# Patient Record
Sex: MALE | Race: BLACK OR AFRICAN AMERICAN | Employment: FULL TIME | URBAN - METROPOLITAN AREA
[De-identification: names, ages, dates, MRNs, and addresses within clinical notes are randomized per-mention and may not be internally consistent; named-entity substitution may affect disease eponyms.]

---

## 2017-03-30 RX ORDER — METOPROLOL TARTRATE 50 MG/1
TABLET, FILM COATED ORAL
Qty: 30 TABLET | Refills: 3 | Status: SHIPPED | OUTPATIENT
Start: 2017-03-30 | End: 2017-08-22 | Stop reason: SDUPTHER

## 2017-09-19 ENCOUNTER — OFFICE VISIT (OUTPATIENT)
Dept: FAMILY MEDICINE CLINIC | Age: 57
End: 2017-09-19

## 2017-09-19 VITALS
SYSTOLIC BLOOD PRESSURE: 134 MMHG | HEIGHT: 65 IN | HEART RATE: 72 BPM | BODY MASS INDEX: 24.99 KG/M2 | WEIGHT: 150 LBS | RESPIRATION RATE: 14 BRPM | TEMPERATURE: 96.3 F | OXYGEN SATURATION: 98 % | DIASTOLIC BLOOD PRESSURE: 88 MMHG

## 2017-09-19 DIAGNOSIS — Z12.11 SCREEN FOR COLON CANCER: ICD-10-CM

## 2017-09-19 DIAGNOSIS — Z23 NEED FOR PNEUMOCOCCAL VACCINATION: ICD-10-CM

## 2017-09-19 DIAGNOSIS — E78.2 MIXED HYPERLIPIDEMIA: ICD-10-CM

## 2017-09-19 DIAGNOSIS — Z23 NEED FOR INFLUENZA VACCINATION: ICD-10-CM

## 2017-09-19 DIAGNOSIS — I10 ESSENTIAL HYPERTENSION: Primary | ICD-10-CM

## 2017-09-19 DIAGNOSIS — N52.2 DRUG-INDUCED ERECTILE DYSFUNCTION: ICD-10-CM

## 2017-09-19 PROCEDURE — 99214 OFFICE O/P EST MOD 30 MIN: CPT | Performed by: NURSE PRACTITIONER

## 2017-09-19 PROCEDURE — 90688 IIV4 VACCINE SPLT 0.5 ML IM: CPT | Performed by: NURSE PRACTITIONER

## 2017-09-19 PROCEDURE — 90472 IMMUNIZATION ADMIN EACH ADD: CPT | Performed by: NURSE PRACTITIONER

## 2017-09-19 PROCEDURE — G8427 DOCREV CUR MEDS BY ELIG CLIN: HCPCS | Performed by: NURSE PRACTITIONER

## 2017-09-19 PROCEDURE — 3017F COLORECTAL CA SCREEN DOC REV: CPT | Performed by: NURSE PRACTITIONER

## 2017-09-19 PROCEDURE — 4004F PT TOBACCO SCREEN RCVD TLK: CPT | Performed by: NURSE PRACTITIONER

## 2017-09-19 PROCEDURE — 90471 IMMUNIZATION ADMIN: CPT | Performed by: NURSE PRACTITIONER

## 2017-09-19 PROCEDURE — G8420 CALC BMI NORM PARAMETERS: HCPCS | Performed by: NURSE PRACTITIONER

## 2017-09-19 PROCEDURE — 90732 PPSV23 VACC 2 YRS+ SUBQ/IM: CPT | Performed by: NURSE PRACTITIONER

## 2017-09-19 RX ORDER — METOPROLOL TARTRATE 50 MG/1
TABLET, FILM COATED ORAL
Qty: 30 TABLET | Refills: 3 | Status: SHIPPED | OUTPATIENT
Start: 2017-09-19 | End: 2018-01-10 | Stop reason: SDUPTHER

## 2017-09-19 RX ORDER — SILDENAFIL 50 MG/1
50 TABLET, FILM COATED ORAL PRN
Qty: 30 TABLET | Refills: 1 | Status: SHIPPED | OUTPATIENT
Start: 2017-09-19 | End: 2019-01-29 | Stop reason: SDUPTHER

## 2017-09-19 ASSESSMENT — PATIENT HEALTH QUESTIONNAIRE - PHQ9
SUM OF ALL RESPONSES TO PHQ9 QUESTIONS 1 & 2: 0
1. LITTLE INTEREST OR PLEASURE IN DOING THINGS: 0
2. FEELING DOWN, DEPRESSED OR HOPELESS: 0
SUM OF ALL RESPONSES TO PHQ QUESTIONS 1-9: 0

## 2017-09-22 ASSESSMENT — ENCOUNTER SYMPTOMS
GASTROINTESTINAL NEGATIVE: 1
ABDOMINAL PAIN: 0
ANAL BLEEDING: 0
COLOR CHANGE: 0
DIARRHEA: 0
ALLERGIC/IMMUNOLOGIC NEGATIVE: 1
EYES NEGATIVE: 1
TROUBLE SWALLOWING: 0
SHORTNESS OF BREATH: 0
CONSTIPATION: 0
RESPIRATORY NEGATIVE: 1
RECTAL PAIN: 0
VOICE CHANGE: 0
BLOOD IN STOOL: 0

## 2017-10-06 ENCOUNTER — HOSPITAL ENCOUNTER (OUTPATIENT)
Age: 57
Setting detail: OUTPATIENT SURGERY
Discharge: HOME | End: 2017-10-06
Attending: SPECIALIST | Admitting: SPECIALIST
Payer: COMMERCIAL

## 2017-10-06 ENCOUNTER — ANESTHESIA EVENT (OUTPATIENT)
Dept: ENDOSCOPY | Age: 57
End: 2017-10-06
Payer: COMMERCIAL

## 2017-10-06 ENCOUNTER — ANESTHESIA (OUTPATIENT)
Dept: ENDOSCOPY | Age: 57
End: 2017-10-06
Payer: COMMERCIAL

## 2017-10-06 VITALS
WEIGHT: 150 LBS | OXYGEN SATURATION: 99 % | RESPIRATION RATE: 16 BRPM | BODY MASS INDEX: 24.99 KG/M2 | HEART RATE: 66 BPM | HEIGHT: 65 IN | SYSTOLIC BLOOD PRESSURE: 198 MMHG | TEMPERATURE: 97.7 F | DIASTOLIC BLOOD PRESSURE: 101 MMHG

## 2017-10-06 VITALS
SYSTOLIC BLOOD PRESSURE: 166 MMHG | RESPIRATION RATE: 15 BRPM | OXYGEN SATURATION: 97 % | DIASTOLIC BLOOD PRESSURE: 88 MMHG

## 2017-10-06 PROCEDURE — 6360000002 HC RX W HCPCS: Performed by: NURSE ANESTHETIST, CERTIFIED REGISTERED

## 2017-10-06 PROCEDURE — 7100000010 HC PHASE II RECOVERY - FIRST 15 MIN: Performed by: SPECIALIST

## 2017-10-06 PROCEDURE — 2580000003 HC RX 258: Performed by: SPECIALIST

## 2017-10-06 PROCEDURE — 88305 TISSUE EXAM BY PATHOLOGIST: CPT

## 2017-10-06 PROCEDURE — 3700000000 HC ANESTHESIA ATTENDED CARE: Performed by: SPECIALIST

## 2017-10-06 PROCEDURE — 3609027000 HC COLONOSCOPY: Performed by: SPECIALIST

## 2017-10-06 PROCEDURE — 3700000001 HC ADD 15 MINUTES (ANESTHESIA): Performed by: SPECIALIST

## 2017-10-06 PROCEDURE — 2500000003 HC RX 250 WO HCPCS: Performed by: SPECIALIST

## 2017-10-06 PROCEDURE — 7100000011 HC PHASE II RECOVERY - ADDTL 15 MIN: Performed by: SPECIALIST

## 2017-10-06 RX ORDER — SODIUM CHLORIDE 0.9 % (FLUSH) 0.9 %
10 SYRINGE (ML) INJECTION EVERY 12 HOURS SCHEDULED
Status: DISCONTINUED | OUTPATIENT
Start: 2017-10-06 | End: 2017-10-06 | Stop reason: HOSPADM

## 2017-10-06 RX ORDER — LIDOCAINE HYDROCHLORIDE 10 MG/ML
1 INJECTION, SOLUTION EPIDURAL; INFILTRATION; INTRACAUDAL; PERINEURAL
Status: COMPLETED | OUTPATIENT
Start: 2017-10-06 | End: 2017-10-06

## 2017-10-06 RX ORDER — PROPOFOL 10 MG/ML
INJECTION, EMULSION INTRAVENOUS PRN
Status: DISCONTINUED | OUTPATIENT
Start: 2017-10-06 | End: 2017-10-06 | Stop reason: SDUPTHER

## 2017-10-06 RX ORDER — CETIRIZINE HYDROCHLORIDE 10 MG/1
10 TABLET ORAL DAILY
COMMUNITY
End: 2020-02-27 | Stop reason: SDUPTHER

## 2017-10-06 RX ORDER — SODIUM CHLORIDE 0.9 % (FLUSH) 0.9 %
10 SYRINGE (ML) INJECTION PRN
Status: DISCONTINUED | OUTPATIENT
Start: 2017-10-06 | End: 2017-10-06 | Stop reason: HOSPADM

## 2017-10-06 RX ORDER — SODIUM CHLORIDE 9 MG/ML
INJECTION, SOLUTION INTRAVENOUS CONTINUOUS
Status: DISCONTINUED | OUTPATIENT
Start: 2017-10-06 | End: 2017-10-06 | Stop reason: HOSPADM

## 2017-10-06 RX ORDER — ONDANSETRON 2 MG/ML
4 INJECTION INTRAMUSCULAR; INTRAVENOUS
Status: DISCONTINUED | OUTPATIENT
Start: 2017-10-06 | End: 2017-10-06 | Stop reason: HOSPADM

## 2017-10-06 RX ADMIN — PROPOFOL 150 MG: 10 INJECTION, EMULSION INTRAVENOUS at 09:32

## 2017-10-06 RX ADMIN — LIDOCAINE HYDROCHLORIDE 30 MG: 10 INJECTION, SOLUTION EPIDURAL; INFILTRATION; INTRACAUDAL; PERINEURAL at 09:32

## 2017-10-06 RX ADMIN — SODIUM CHLORIDE: 9 INJECTION, SOLUTION INTRAVENOUS at 08:44

## 2017-10-06 ASSESSMENT — PAIN - FUNCTIONAL ASSESSMENT: PAIN_FUNCTIONAL_ASSESSMENT: 0-10

## 2017-10-06 ASSESSMENT — PAIN DESCRIPTION - DESCRIPTORS: DESCRIPTORS: ACHING

## 2017-10-06 NOTE — ANESTHESIA PRE PROCEDURE
Department of Anesthesiology  Preprocedure Note       Name:  Panda Human   Age:  62 y.o.  :  1960                                          MRN:  [id number]         Date:  10/6/2017      Surgeon: Roxana Sanchez):  Angel Don MD    Procedure: Procedure(s):  COLONOSCOPY    Medications prior to admission:   Prior to Admission medications    Medication Sig Start Date End Date Taking?  Authorizing Provider   cetirizine (ZYRTEC) 10 MG tablet Take 10 mg by mouth daily   Yes Historical Provider, MD   sildenafil (VIAGRA) 50 MG tablet Take 1 tablet by mouth as needed for Erectile Dysfunction 17  Yes Pheobe Merlin, NP   metoprolol tartrate (LOPRESSOR) 50 MG tablet take 1 tablet by mouth once daily 17  Yes Pheobe Merlin, NP       Current medications:    Current Facility-Administered Medications   Medication Dose Route Frequency Provider Last Rate Last Dose    0.9 % sodium chloride infusion   Intravenous Continuous Angel Don  mL/hr at 10/06/17 0844      sodium chloride flush 0.9 % injection 10 mL  10 mL Intravenous 2 times per day Angel Don MD        sodium chloride flush 0.9 % injection 10 mL  10 mL Intravenous PRN Angel Don MD        lidocaine PF 1 % injection 1 mL  1 mL Intradermal Once PRN Angel Don MD           Allergies:  No Known Allergies    Problem List:    Patient Active Problem List   Diagnosis Code    Osteoarthritis M19.90    Hypertension I10    Hyperlipidemia E78.5       Past Medical History:        Diagnosis Date    Hyperlipidemia     Hypertension     Osteoarthritis     S/P inguinal herniorrhaphy     left       Past Surgical History:        Procedure Laterality Date    HERNIA REPAIR Bilateral     TONSILLECTOMY AND ADENOIDECTOMY         Social History:    Social History   Substance Use Topics    Smoking status: Current Every Day Smoker     Packs/day: 1.00     Years: 20.00     Types: Cigarettes    Smokeless tobacco: Not on file    Alcohol use 10.8 Screen (If Applicable):  No results found for: [facility], [address]    Anesthesia Evaluation  Patient summary reviewed no history of anesthetic complications:   Airway: Mallampati: II  TM distance: >3 FB   Neck ROM: full  Mouth opening: > = 3 FB Dental: normal exam         Pulmonary:negative ROS and normal exam           Cardiovascular:  Exercise tolerance: good (>4 METS),   (+) hypertension: moderate,             Beta Blocker:  Dose within 24 Hrs   Neuro/Psych:   {neg ROS     GI/Hepatic/Renal:   (+) bowel prep        Endo/Other:    (+) : arthritis: OA. Abdominal:                    Anesthesia Plan    ASA 2     MAC     intravenous induction   Anesthetic plan and risks discussed with patient.     Plan discussed with surgical team.            July Bowen CRNA   10/6/2017

## 2017-10-06 NOTE — ANESTHESIA POSTPROCEDURE EVALUATION
Department of Anesthesiology  Postprocedure Note    Patient: Agueda Chang  MRN: [id number]  YOB: 1960  Date of evaluation: 10/6/2017  Time:  9:54 AM     Procedure Summary     Date Anesthesia Start Anesthesia Stop Room / Location    10/06/17 0932 [facility] [address] [facility]       Procedure Diagnosis Surgeon Responsible Provider    COLONOSCOPY (N/A ) ([id number] - Screening, h/x of colon polyps) MD Nahum Parra CRNA          Anesthesia Type: MAC    Rosa Phase I:      Rosa Phase II:      Last vitals:  BP  166/58   Temp   36   Pulse  68   Resp   18   SpO2   100     Anesthesia Post Evaluation    Patient location during evaluation: bedside  Patient participation: complete - patient participated  Level of consciousness: awake and awake and alert  Pain score: 0  Airway patency: patent  Nausea & Vomiting: no nausea and no vomiting  Complications: no  Cardiovascular status: blood pressure returned to baseline and hemodynamically stable  Respiratory status: acceptable  Hydration status: euvolemic

## 2017-11-01 DIAGNOSIS — E78.2 MIXED HYPERLIPIDEMIA: ICD-10-CM

## 2017-11-01 DIAGNOSIS — I10 ESSENTIAL HYPERTENSION: ICD-10-CM

## 2017-11-01 LAB
ALBUMIN SERPL-MCNC: 4.5 G/DL (ref 3.9–4.9)
ALP BLD-CCNC: 69 U/L (ref 35–104)
ALT SERPL-CCNC: 17 U/L (ref 0–41)
ANION GAP SERPL CALCULATED.3IONS-SCNC: 16 MEQ/L (ref 7–13)
AST SERPL-CCNC: 18 U/L (ref 0–40)
BILIRUB SERPL-MCNC: 0.6 MG/DL (ref 0–1.2)
BUN BLDV-MCNC: 13 MG/DL (ref 6–20)
CALCIUM SERPL-MCNC: 9.6 MG/DL (ref 8.6–10.2)
CHLORIDE BLD-SCNC: 100 MEQ/L (ref 98–107)
CHOLESTEROL, TOTAL: 269 MG/DL (ref 0–199)
CO2: 24 MEQ/L (ref 22–29)
CREAT SERPL-MCNC: 1.07 MG/DL (ref 0.7–1.2)
GFR AFRICAN AMERICAN: >60
GFR NON-AFRICAN AMERICAN: >60
GLOBULIN: 3.2 G/DL (ref 2.3–3.5)
GLUCOSE BLD-MCNC: 99 MG/DL (ref 74–109)
HCT VFR BLD CALC: 47.9 % (ref 42–52)
HDLC SERPL-MCNC: 41 MG/DL (ref 40–59)
HEMOGLOBIN: 15.7 G/DL (ref 14–18)
LDL CHOLESTEROL CALCULATED: 194 MG/DL (ref 0–129)
MCH RBC QN AUTO: 28.1 PG (ref 27–31.3)
MCHC RBC AUTO-ENTMCNC: 32.8 % (ref 33–37)
MCV RBC AUTO: 85.8 FL (ref 80–100)
PDW BLD-RTO: 14.1 % (ref 11.5–14.5)
PLATELET # BLD: 256 K/UL (ref 130–400)
POTASSIUM SERPL-SCNC: 4.4 MEQ/L (ref 3.5–5.1)
RBC # BLD: 5.59 M/UL (ref 4.7–6.1)
SODIUM BLD-SCNC: 140 MEQ/L (ref 132–144)
TOTAL PROTEIN: 7.7 G/DL (ref 6.4–8.1)
TRIGL SERPL-MCNC: 170 MG/DL (ref 0–200)
WBC # BLD: 5.9 K/UL (ref 4.8–10.8)

## 2017-11-28 RX ORDER — ATORVASTATIN CALCIUM 10 MG/1
10 TABLET, FILM COATED ORAL DAILY
Qty: 30 TABLET | Refills: 3 | Status: SHIPPED | OUTPATIENT
Start: 2017-11-28 | End: 2018-03-27 | Stop reason: SDUPTHER

## 2017-12-18 ENCOUNTER — OFFICE VISIT (OUTPATIENT)
Dept: FAMILY MEDICINE CLINIC | Age: 57
End: 2017-12-18

## 2017-12-18 VITALS
HEART RATE: 77 BPM | OXYGEN SATURATION: 98 % | SYSTOLIC BLOOD PRESSURE: 128 MMHG | RESPIRATION RATE: 14 BRPM | TEMPERATURE: 98.3 F | DIASTOLIC BLOOD PRESSURE: 80 MMHG | HEIGHT: 65 IN | WEIGHT: 159 LBS | BODY MASS INDEX: 26.49 KG/M2

## 2017-12-18 DIAGNOSIS — H61.22 CERUMEN DEBRIS ON TYMPANIC MEMBRANE OF LEFT EAR: ICD-10-CM

## 2017-12-18 DIAGNOSIS — I10 ESSENTIAL HYPERTENSION: Primary | ICD-10-CM

## 2017-12-18 DIAGNOSIS — E78.2 MIXED HYPERLIPIDEMIA: ICD-10-CM

## 2017-12-18 PROCEDURE — G8484 FLU IMMUNIZE NO ADMIN: HCPCS | Performed by: NURSE PRACTITIONER

## 2017-12-18 PROCEDURE — G8419 CALC BMI OUT NRM PARAM NOF/U: HCPCS | Performed by: NURSE PRACTITIONER

## 2017-12-18 PROCEDURE — 99214 OFFICE O/P EST MOD 30 MIN: CPT | Performed by: NURSE PRACTITIONER

## 2017-12-18 PROCEDURE — G8427 DOCREV CUR MEDS BY ELIG CLIN: HCPCS | Performed by: NURSE PRACTITIONER

## 2017-12-18 PROCEDURE — 3017F COLORECTAL CA SCREEN DOC REV: CPT | Performed by: NURSE PRACTITIONER

## 2017-12-18 PROCEDURE — 69209 REMOVE IMPACTED EAR WAX UNI: CPT | Performed by: NURSE PRACTITIONER

## 2017-12-18 PROCEDURE — 4004F PT TOBACCO SCREEN RCVD TLK: CPT | Performed by: NURSE PRACTITIONER

## 2017-12-18 ASSESSMENT — ENCOUNTER SYMPTOMS
COLOR CHANGE: 0
BLOOD IN STOOL: 0
RECTAL PAIN: 0
TROUBLE SWALLOWING: 0
ANAL BLEEDING: 0
DIARRHEA: 0
EYES NEGATIVE: 1
ABDOMINAL PAIN: 0
ALLERGIC/IMMUNOLOGIC NEGATIVE: 1
VOICE CHANGE: 0
SHORTNESS OF BREATH: 0
RESPIRATORY NEGATIVE: 1
CONSTIPATION: 0
GASTROINTESTINAL NEGATIVE: 1

## 2017-12-18 NOTE — PATIENT INSTRUCTIONS
Thank you for enrolling in [address] Please follow the instructions below to securely access your online medical record. [employer] allows you to send messages to your doctor, view your test results, renew your prescriptions, schedule appointments, and more. How Do I Sign Up? 1. In your Internet browser, go to [email or web address]  2. Click on the Sign Up Now link in the Sign In box. You will see the New Member Sign Up page. 3. Enter your [employer] Access Code exactly as it appears below. You will not need to use this code after youve completed the sign-up process. If you do not sign up before the expiration date, you must request a new code. [employer] Access Code: [email or web address]  Expires: 2/16/2018  4:37 PM    4. Enter your Social Security Number ([id number]) and Date of Birth (mm/dd/yyyy) as indicated and click Submit. You will be taken to the next sign-up page. 5. Create a [employer] ID. This will be your [employer] login ID and cannot be changed, so think of one that is secure and easy to remember. 6. Create a [employer] password. You can change your password at any time. 7. Enter your Password Reset Question and Answer. This can be used at a later time if you forget your password. 8. Enter your e-mail address. You will receive e-mail notification when new information is available in [address] 9. Click Sign Up. You can now view your medical record. Additional Information  If you have questions, please contact your physician practice where you receive care. Remember, [employer] is NOT to be used for urgent needs. For medical emergencies, dial 911.

## 2017-12-18 NOTE — PROGRESS NOTES
- 144 mEq/L    Potassium 4.4 3.5 - 5.1 mEq/L    Chloride 100 98 - 107 mEq/L    CO2 24 22 - 29 mEq/L    Anion Gap 16 (H) 7 - 13 mEq/L    Glucose 99 74 - 109 mg/dL    BUN 13 6 - 20 mg/dL    CREATININE 1.07 0.70 - 1.20 mg/dL    GFR Non-African American >60.0 >60    GFR  >60.0 >60    Calcium 9.6 8.6 - 10.2 mg/dL    Total Protein 7.7 6.4 - 8.1 g/dL    Alb 4.5 3.9 - 4.9 g/dL    Total Bilirubin 0.6 0.0 - 1.2 mg/dL    Alkaline Phosphatase 69 35 - 104 U/L    ALT 17 0 - 41 U/L    AST 18 0 - 40 U/L    Globulin 3.2 2.3 - 3.5 g/dL   CBC   Result Value Ref Range    WBC 5.9 4.8 - 10.8 K/uL    RBC 5.59 4.70 - 6.10 M/uL    Hemoglobin 15.7 14.0 - 18.0 g/dL    Hematocrit 47.9 42.0 - 52.0 %    MCV 85.8 80.0 - 100.0 fL    MCH 28.1 27.0 - 31.3 pg    MCHC 32.8 (L) 33.0 - 37.0 %    RDW 14.1 11.5 - 14.5 %    Platelets [phone] - 853 K/uL           Assessment & Plan   Georges Goldberg was seen today for discuss labs. Diagnoses and all orders for this visit:    Essential hypertension    Mixed hyperlipidemia  -     Lipid Panel; Future  -     Comprehensive Metabolic Panel; Future    Cerumen debris on tympanic membrane of left ear  -     CA REMOVAL IMPACTED CERUMEN IRRIGATION/LVG UNILAT  -     21874 - CA REMOVE IMPACTED EAR WAX      Orders Placed This Encounter   Procedures    Lipid Panel     Standing Status:   Future     Standing Expiration Date:   12/18/2018     Order Specific Question:   Is Patient Fasting?/# of Hours     Answer:   y    Comprehensive Metabolic Panel     Standing Status:   Future     Standing Expiration Date:   12/18/2018    CA REMOVAL IMPACTED CERUMEN IRRIGATION/LVG UNILAT    22011 - CA REMOVE IMPACTED EAR WAX     No orders of the defined types were placed in this encounter. There are no discontinued medications. Return for Labs in 6 weeks. Reviewed with the patient: current clinical status, medications, activities and diet.      Side effects, adverse effects of the medication prescribed today, as well as treatment plan/ rationale and result expectations have been discussed with the patient who expresses understanding and desires to proceed. Close follow up to evaluate treatment results and for coordination of care. I have reviewed the patient's medical history in detail and updated the computerized patient record.     Kenia Marrero NP

## 2018-01-10 DIAGNOSIS — I10 ESSENTIAL HYPERTENSION: ICD-10-CM

## 2018-01-10 RX ORDER — METOPROLOL TARTRATE 50 MG/1
TABLET, FILM COATED ORAL
Qty: 30 TABLET | Refills: 3 | Status: SHIPPED | OUTPATIENT
Start: 2018-01-10 | End: 2018-05-25 | Stop reason: SDUPTHER

## 2018-03-27 RX ORDER — ATORVASTATIN CALCIUM 10 MG/1
10 TABLET, FILM COATED ORAL DAILY
Qty: 30 TABLET | Refills: 3 | Status: SHIPPED | OUTPATIENT
Start: 2018-03-27 | End: 2018-07-24 | Stop reason: SDUPTHER

## 2018-05-25 DIAGNOSIS — I10 ESSENTIAL HYPERTENSION: ICD-10-CM

## 2018-05-25 RX ORDER — METOPROLOL TARTRATE 50 MG/1
TABLET, FILM COATED ORAL
Qty: 30 TABLET | Refills: 0 | Status: SHIPPED | OUTPATIENT
Start: 2018-05-25 | End: 2018-07-06 | Stop reason: SDUPTHER

## 2018-07-24 ENCOUNTER — OFFICE VISIT (OUTPATIENT)
Dept: FAMILY MEDICINE CLINIC | Age: 58
End: 2018-07-24
Payer: COMMERCIAL

## 2018-07-24 VITALS
DIASTOLIC BLOOD PRESSURE: 88 MMHG | HEART RATE: 64 BPM | HEIGHT: 65 IN | BODY MASS INDEX: 27.96 KG/M2 | WEIGHT: 167.8 LBS | OXYGEN SATURATION: 99 % | TEMPERATURE: 97.8 F | RESPIRATION RATE: 16 BRPM | SYSTOLIC BLOOD PRESSURE: 134 MMHG

## 2018-07-24 DIAGNOSIS — E78.2 MIXED HYPERLIPIDEMIA: Primary | ICD-10-CM

## 2018-07-24 DIAGNOSIS — E78.2 MIXED HYPERLIPIDEMIA: ICD-10-CM

## 2018-07-24 DIAGNOSIS — I10 ESSENTIAL HYPERTENSION: ICD-10-CM

## 2018-07-24 DIAGNOSIS — J30.1 ACUTE SEASONAL ALLERGIC RHINITIS DUE TO POLLEN: ICD-10-CM

## 2018-07-24 LAB
ALBUMIN SERPL-MCNC: 4 G/DL (ref 3.9–4.9)
ALP BLD-CCNC: 78 U/L (ref 35–104)
ALT SERPL-CCNC: 23 U/L (ref 0–41)
ANION GAP SERPL CALCULATED.3IONS-SCNC: 14 MEQ/L (ref 7–13)
AST SERPL-CCNC: 24 U/L (ref 0–40)
BILIRUB SERPL-MCNC: <0.2 MG/DL (ref 0–1.2)
BUN BLDV-MCNC: 12 MG/DL (ref 6–20)
CALCIUM SERPL-MCNC: 8.7 MG/DL (ref 8.6–10.2)
CHLORIDE BLD-SCNC: 103 MEQ/L (ref 98–107)
CHOLESTEROL, TOTAL: 200 MG/DL (ref 0–199)
CO2: 23 MEQ/L (ref 22–29)
CREAT SERPL-MCNC: 1.04 MG/DL (ref 0.7–1.2)
GFR AFRICAN AMERICAN: >60
GFR NON-AFRICAN AMERICAN: >60
GLOBULIN: 3.5 G/DL (ref 2.3–3.5)
GLUCOSE BLD-MCNC: 96 MG/DL (ref 74–109)
HDLC SERPL-MCNC: 36 MG/DL (ref 40–59)
LDL CHOLESTEROL CALCULATED: 116 MG/DL (ref 0–129)
POTASSIUM SERPL-SCNC: 4.5 MEQ/L (ref 3.5–5.1)
SODIUM BLD-SCNC: 140 MEQ/L (ref 132–144)
TOTAL PROTEIN: 7.5 G/DL (ref 6.4–8.1)
TRIGL SERPL-MCNC: 238 MG/DL (ref 0–200)

## 2018-07-24 PROCEDURE — 99214 OFFICE O/P EST MOD 30 MIN: CPT | Performed by: NURSE PRACTITIONER

## 2018-07-24 PROCEDURE — G8419 CALC BMI OUT NRM PARAM NOF/U: HCPCS | Performed by: NURSE PRACTITIONER

## 2018-07-24 PROCEDURE — G8427 DOCREV CUR MEDS BY ELIG CLIN: HCPCS | Performed by: NURSE PRACTITIONER

## 2018-07-24 PROCEDURE — 3017F COLORECTAL CA SCREEN DOC REV: CPT | Performed by: NURSE PRACTITIONER

## 2018-07-24 PROCEDURE — 4004F PT TOBACCO SCREEN RCVD TLK: CPT | Performed by: NURSE PRACTITIONER

## 2018-07-24 PROCEDURE — 96372 THER/PROPH/DIAG INJ SC/IM: CPT | Performed by: NURSE PRACTITIONER

## 2018-07-24 RX ORDER — METOPROLOL TARTRATE 50 MG/1
TABLET, FILM COATED ORAL
Qty: 30 TABLET | Refills: 5 | Status: SHIPPED | OUTPATIENT
Start: 2018-07-24 | End: 2020-02-27 | Stop reason: CLARIF

## 2018-07-24 RX ORDER — ATORVASTATIN CALCIUM 10 MG/1
10 TABLET, FILM COATED ORAL DAILY
Qty: 30 TABLET | Refills: 5 | Status: SHIPPED | OUTPATIENT
Start: 2018-07-24 | End: 2019-01-29 | Stop reason: SDUPTHER

## 2018-07-24 RX ORDER — METHYLPREDNISOLONE ACETATE 80 MG/ML
80 INJECTION, SUSPENSION INTRA-ARTICULAR; INTRALESIONAL; INTRAMUSCULAR; SOFT TISSUE ONCE
Status: COMPLETED | OUTPATIENT
Start: 2018-07-24 | End: 2018-07-24

## 2018-07-24 RX ADMIN — METHYLPREDNISOLONE ACETATE 80 MG: 80 INJECTION, SUSPENSION INTRA-ARTICULAR; INTRALESIONAL; INTRAMUSCULAR; SOFT TISSUE at 16:05

## 2018-07-24 ASSESSMENT — PATIENT HEALTH QUESTIONNAIRE - PHQ9
1. LITTLE INTEREST OR PLEASURE IN DOING THINGS: 0
SUM OF ALL RESPONSES TO PHQ9 QUESTIONS 1 & 2: 0
SUM OF ALL RESPONSES TO PHQ QUESTIONS 1-9: 0
2. FEELING DOWN, DEPRESSED OR HOPELESS: 0

## 2018-07-31 ASSESSMENT — ENCOUNTER SYMPTOMS
ALLERGIC/IMMUNOLOGIC NEGATIVE: 1
COLOR CHANGE: 0
VOICE CHANGE: 0
GASTROINTESTINAL NEGATIVE: 1
ABDOMINAL PAIN: 0
RESPIRATORY NEGATIVE: 1
TROUBLE SWALLOWING: 0
DIARRHEA: 0
CONSTIPATION: 0
SHORTNESS OF BREATH: 0
EYES NEGATIVE: 1
BLOOD IN STOOL: 0
ANAL BLEEDING: 0
RECTAL PAIN: 0

## 2018-07-31 NOTE — PROGRESS NOTES
Subjective  Leane [place], 62 y.o. male presents today with:  Chief Complaint   Patient presents with    Medication Refill     pt states he's just here for a refill on his metoprolol. has no concerns or issues at this time           HTN-  Not well controlled currently taking  Metoprolol 50mg,  Would like to increase or ad another medication-  Gets headaches when BP is elevated. Hyperlipidemia-  Untreated has been trying dietary changes -  Not working well has  agreed to start Lipitor  ED-  Uses Viagra PRN with good results. He is currently taking all prescribed medications at home. Overall is doing well. Has no other questions or concerns at this time. Review of Systems   Constitutional: Negative. Negative for activity change, appetite change, fatigue and unexpected weight change. HENT: Negative for dental problem, nosebleeds, trouble swallowing and voice change. Eyes: Negative. Negative for visual disturbance. Respiratory: Negative. Negative for shortness of breath. Cardiovascular: Negative. Negative for chest pain, palpitations and leg swelling. Gastrointestinal: Negative. Negative for abdominal pain, anal bleeding, blood in stool, constipation, diarrhea and rectal pain. Endocrine: Negative. Negative for cold intolerance, heat intolerance, polydipsia, polyphagia and polyuria. Genitourinary: Negative. Musculoskeletal: Negative. Skin: Negative. Negative for color change and rash. Allergic/Immunologic: Negative. Neurological: Positive for headaches (intermittently when BP elevated). Negative for dizziness, syncope and weakness. Hematological: Negative. Negative for adenopathy. Does not bruise/bleed easily. Psychiatric/Behavioral: Negative. Negative for dysphoric mood and sleep disturbance. The patient is not nervous/anxious.         Past Medical History:   Diagnosis Date    Hyperlipidemia     Hypertension     Osteoarthritis     S/P inguinal herniorrhaphy He does not have a sickly appearance. He does not appear ill. No distress. HENT:   Head: Normocephalic and atraumatic. Right Ear: Tympanic membrane, external ear and ear canal normal.   Left Ear: Tympanic membrane, external ear and ear canal normal.   Nose: Nose normal.   Mouth/Throat: Uvula is midline, oropharynx is clear and moist and mucous membranes are normal.   Initially left TM was occluded with cerumen. Left ears cleaned with curettes , the flushed with tepid tap water for large amounts of chunky think brown discharge. TM was visualized  after irrigation. Eyes: Conjunctivae and EOM are normal. Pupils are equal, round, and reactive to light. Neck: Normal range of motion. Neck supple. No JVD present. No thyromegaly present. Cardiovascular: Normal rate, regular rhythm, S1 normal, S2 normal, normal heart sounds and intact distal pulses. PMI is not displaced. No murmur heard. Pulmonary/Chest: Effort normal and breath sounds normal. No accessory muscle usage. No tachypnea. No respiratory distress. He has no decreased breath sounds. He has no wheezes. He has no rhonchi. He has no rales. Abdominal: Soft. Bowel sounds are normal. He exhibits no distension and no mass. There is no splenomegaly or hepatomegaly. There is no tenderness. There is no CVA tenderness. Lymphadenopathy:     He has no cervical adenopathy. Neurological: He is alert and oriented to person, place, and time. He has normal strength. No cranial nerve deficit or sensory deficit. He displays a negative Romberg sign. Coordination and gait normal. GCS eye subscore is 4. GCS verbal subscore is 5. GCS motor subscore is 6. Skin: Skin is warm, dry and intact. No rash noted. Psychiatric: He has a normal mood and affect.  His speech is normal and behavior is normal.     Results for orders placed or performed in visit on 11/01/17   Lipid Panel   Result Value Ref Range    Cholesterol, Total 269 (H) 0 - 199 mg/dL    Triglycerides 170 0 - 200 mg/dL    HDL 41 40 - 59 mg/dL    LDL Calculated 194 (H) 0 - 129 mg/dL   Comprehensive Metabolic Panel   Result Value Ref Range    Sodium 140 132 - 144 mEq/L    Potassium 4.4 3.5 - 5.1 mEq/L    Chloride 100 98 - 107 mEq/L    CO2 24 22 - 29 mEq/L    Anion Gap 16 (H) 7 - 13 mEq/L    Glucose 99 74 - 109 mg/dL    BUN 13 6 - 20 mg/dL    CREATININE 1.07 0.70 - 1.20 mg/dL    GFR Non-African American >60.0 >60    GFR  >60.0 >60    Calcium 9.6 8.6 - 10.2 mg/dL    Total Protein 7.7 6.4 - 8.1 g/dL    Alb 4.5 3.9 - 4.9 g/dL    Total Bilirubin 0.6 0.0 - 1.2 mg/dL    Alkaline Phosphatase 69 35 - 104 U/L    ALT 17 0 - 41 U/L    AST 18 0 - 40 U/L    Globulin 3.2 2.3 - 3.5 g/dL   CBC   Result Value Ref Range    WBC 5.9 4.8 - 10.8 K/uL    RBC 5.59 4.70 - 6.10 M/uL    Hemoglobin 15.7 14.0 - 18.0 g/dL    Hematocrit 47.9 42.0 - 52.0 %    MCV 85.8 80.0 - 100.0 fL    MCH 28.1 27.0 - 31.3 pg    MCHC 32.8 (L) 33.0 - 37.0 %    RDW 14.1 11.5 - 14.5 %    Platelets 715 988 - 151 K/uL           Assessment & Plan   Ashutosh [place] was seen today for medication refill. Diagnoses and all orders for this visit:    Mixed hyperlipidemia  -     Lipid Panel; Future  -     Comprehensive Metabolic Panel; Future    Essential hypertension  -     metoprolol tartrate (LOPRESSOR) 50 MG tablet; take 1 tablet by mouth once daily  -     Lipid Panel; Future  -     Comprehensive Metabolic Panel; Future    Acute seasonal allergic rhinitis due to pollen  -     methylPREDNISolone acetate (DEPO-MEDROL) injection 80 mg; Inject 1 mL into the muscle once    Other orders  -     atorvastatin (LIPITOR) 10 MG tablet;  Take 1 tablet by mouth daily      Orders Placed This Encounter   Procedures    Lipid Panel     Standing Status:   Future     Number of Occurrences:   1     Standing Expiration Date:   7/24/2019     Order Specific Question:   Is Patient Fasting?/# of Hours     Answer:   n    Comprehensive Metabolic Panel     Standing Status:   Future     Number

## 2019-01-29 ENCOUNTER — OFFICE VISIT (OUTPATIENT)
Dept: FAMILY MEDICINE CLINIC | Age: 59
End: 2019-01-29
Payer: COMMERCIAL

## 2019-01-29 VITALS
HEIGHT: 65 IN | RESPIRATION RATE: 15 BRPM | WEIGHT: 166 LBS | DIASTOLIC BLOOD PRESSURE: 102 MMHG | TEMPERATURE: 97.9 F | SYSTOLIC BLOOD PRESSURE: 160 MMHG | HEART RATE: 86 BPM | BODY MASS INDEX: 27.66 KG/M2

## 2019-01-29 DIAGNOSIS — I10 ESSENTIAL HYPERTENSION: ICD-10-CM

## 2019-01-29 DIAGNOSIS — N52.2 DRUG-INDUCED ERECTILE DYSFUNCTION: ICD-10-CM

## 2019-01-29 DIAGNOSIS — Z12.5 PROSTATE CANCER SCREENING: Primary | ICD-10-CM

## 2019-01-29 DIAGNOSIS — E78.2 MIXED HYPERLIPIDEMIA: ICD-10-CM

## 2019-01-29 PROCEDURE — 4004F PT TOBACCO SCREEN RCVD TLK: CPT | Performed by: NURSE PRACTITIONER

## 2019-01-29 PROCEDURE — G8484 FLU IMMUNIZE NO ADMIN: HCPCS | Performed by: NURSE PRACTITIONER

## 2019-01-29 PROCEDURE — 99214 OFFICE O/P EST MOD 30 MIN: CPT | Performed by: NURSE PRACTITIONER

## 2019-01-29 PROCEDURE — 3017F COLORECTAL CA SCREEN DOC REV: CPT | Performed by: NURSE PRACTITIONER

## 2019-01-29 PROCEDURE — G8427 DOCREV CUR MEDS BY ELIG CLIN: HCPCS | Performed by: NURSE PRACTITIONER

## 2019-01-29 PROCEDURE — G8419 CALC BMI OUT NRM PARAM NOF/U: HCPCS | Performed by: NURSE PRACTITIONER

## 2019-01-29 RX ORDER — METOPROLOL TARTRATE AND HYDROCHLOROTHIAZIDE 100; 25 MG/1; MG/1
1 TABLET ORAL DAILY
Qty: 30 TABLET | Refills: 5 | Status: SHIPPED | OUTPATIENT
Start: 2019-01-29 | End: 2019-07-27 | Stop reason: SDUPTHER

## 2019-01-29 RX ORDER — ATORVASTATIN CALCIUM 10 MG/1
10 TABLET, FILM COATED ORAL DAILY
Qty: 30 TABLET | Refills: 5 | Status: SHIPPED | OUTPATIENT
Start: 2019-01-29 | End: 2019-08-23 | Stop reason: SDUPTHER

## 2019-01-29 RX ORDER — SILDENAFIL 50 MG/1
50 TABLET, FILM COATED ORAL PRN
Qty: 30 TABLET | Refills: 1 | Status: SHIPPED | OUTPATIENT
Start: 2019-01-29 | End: 2020-02-27 | Stop reason: CLARIF

## 2019-01-29 ASSESSMENT — ENCOUNTER SYMPTOMS
BLOOD IN STOOL: 0
ABDOMINAL PAIN: 0
EYES NEGATIVE: 1
TROUBLE SWALLOWING: 0
RECTAL PAIN: 0
GASTROINTESTINAL NEGATIVE: 1
SHORTNESS OF BREATH: 0
ALLERGIC/IMMUNOLOGIC NEGATIVE: 1
DIARRHEA: 0
RESPIRATORY NEGATIVE: 1
COLOR CHANGE: 0
VOICE CHANGE: 0
ANAL BLEEDING: 0
CONSTIPATION: 0

## 2019-02-09 DIAGNOSIS — Z12.5 PROSTATE CANCER SCREENING: ICD-10-CM

## 2019-02-09 DIAGNOSIS — E78.2 MIXED HYPERLIPIDEMIA: ICD-10-CM

## 2019-02-09 DIAGNOSIS — I10 ESSENTIAL HYPERTENSION: ICD-10-CM

## 2019-02-09 LAB
ALBUMIN SERPL-MCNC: 4.3 G/DL (ref 3.5–4.6)
ALP BLD-CCNC: 77 U/L (ref 35–104)
ALT SERPL-CCNC: 25 U/L (ref 0–41)
ANION GAP SERPL CALCULATED.3IONS-SCNC: 15 MEQ/L (ref 9–15)
AST SERPL-CCNC: 24 U/L (ref 0–40)
ATYPICAL LYMPHOCYTE RELATIVE PERCENT: 3 %
BASOPHILS ABSOLUTE: 0.1 K/UL (ref 0–0.2)
BASOPHILS RELATIVE PERCENT: 3 %
BILIRUB SERPL-MCNC: <0.2 MG/DL (ref 0.2–0.7)
BUN BLDV-MCNC: 13 MG/DL (ref 6–20)
CALCIUM SERPL-MCNC: 10 MG/DL (ref 8.5–9.9)
CHLORIDE BLD-SCNC: 99 MEQ/L (ref 95–107)
CHOLESTEROL, TOTAL: 144 MG/DL (ref 0–199)
CO2: 27 MEQ/L (ref 20–31)
CREAT SERPL-MCNC: 1.13 MG/DL (ref 0.7–1.2)
EOSINOPHILS ABSOLUTE: 0 K/UL (ref 0–0.7)
EOSINOPHILS RELATIVE PERCENT: 1 %
GFR AFRICAN AMERICAN: >60
GFR NON-AFRICAN AMERICAN: >60
GLOBULIN: 4.1 G/DL (ref 2.3–3.5)
GLUCOSE BLD-MCNC: 93 MG/DL (ref 70–99)
HCT VFR BLD CALC: 47.7 % (ref 42–52)
HDLC SERPL-MCNC: 34 MG/DL (ref 40–59)
HEMOGLOBIN: 15.8 G/DL (ref 14–18)
LDL CHOLESTEROL CALCULATED: 89 MG/DL (ref 0–129)
LYMPHOCYTES ABSOLUTE: 1.3 K/UL (ref 1–4.8)
LYMPHOCYTES RELATIVE PERCENT: 29 %
MCH RBC QN AUTO: 28.8 PG (ref 27–31.3)
MCHC RBC AUTO-ENTMCNC: 33.1 % (ref 33–37)
MCV RBC AUTO: 86.9 FL (ref 80–100)
MONOCYTES ABSOLUTE: 0.3 K/UL (ref 0.2–0.8)
MONOCYTES RELATIVE PERCENT: 6.7 %
NEUTROPHILS ABSOLUTE: 2.3 K/UL (ref 1.4–6.5)
NEUTROPHILS RELATIVE PERCENT: 58 %
PDW BLD-RTO: 14.1 % (ref 11.5–14.5)
PLATELET # BLD: 236 K/UL (ref 130–400)
PLATELET SLIDE REVIEW: NORMAL
POTASSIUM SERPL-SCNC: 4.5 MEQ/L (ref 3.4–4.9)
PROSTATE SPECIFIC ANTIGEN: 5.08 NG/ML (ref 0–5.4)
RBC # BLD: 5.49 M/UL (ref 4.7–6.1)
SODIUM BLD-SCNC: 141 MEQ/L (ref 135–144)
TOTAL PROTEIN: 8.4 G/DL (ref 6.3–8)
TRIGL SERPL-MCNC: 107 MG/DL (ref 0–150)
WBC # BLD: 4 K/UL (ref 4.8–10.8)

## 2019-07-20 ENCOUNTER — TELEPHONE (OUTPATIENT)
Dept: FAMILY MEDICINE CLINIC | Age: 59
End: 2019-07-20

## 2019-07-20 DIAGNOSIS — E78.2 MIXED HYPERLIPIDEMIA: ICD-10-CM

## 2019-07-20 DIAGNOSIS — I10 ESSENTIAL HYPERTENSION: Primary | ICD-10-CM

## 2019-07-27 DIAGNOSIS — I10 ESSENTIAL HYPERTENSION: Primary | ICD-10-CM

## 2019-07-27 RX ORDER — METOPROLOL TARTRATE AND HYDROCHLOROTHIAZIDE 100; 25 MG/1; MG/1
TABLET ORAL
Qty: 30 TABLET | Refills: 5 | Status: SHIPPED | OUTPATIENT
Start: 2019-07-27 | End: 2020-01-27

## 2019-08-23 RX ORDER — ATORVASTATIN CALCIUM 10 MG/1
TABLET, FILM COATED ORAL
Qty: 30 TABLET | Refills: 5 | Status: SHIPPED | OUTPATIENT
Start: 2019-08-23 | End: 2020-02-27 | Stop reason: SDUPTHER

## 2020-01-27 RX ORDER — METOPROLOL TARTRATE AND HYDROCHLOROTHIAZIDE 100; 25 MG/1; MG/1
TABLET ORAL
Qty: 30 TABLET | Refills: 5 | Status: SHIPPED | OUTPATIENT
Start: 2020-01-27 | End: 2020-02-27 | Stop reason: SDUPTHER

## 2020-02-27 ENCOUNTER — OFFICE VISIT (OUTPATIENT)
Dept: FAMILY MEDICINE CLINIC | Age: 60
End: 2020-02-27
Payer: COMMERCIAL

## 2020-02-27 VITALS
RESPIRATION RATE: 16 BRPM | DIASTOLIC BLOOD PRESSURE: 84 MMHG | SYSTOLIC BLOOD PRESSURE: 138 MMHG | WEIGHT: 159 LBS | HEIGHT: 65 IN | BODY MASS INDEX: 26.49 KG/M2 | HEART RATE: 86 BPM

## 2020-02-27 PROCEDURE — G8427 DOCREV CUR MEDS BY ELIG CLIN: HCPCS | Performed by: NURSE PRACTITIONER

## 2020-02-27 PROCEDURE — G8419 CALC BMI OUT NRM PARAM NOF/U: HCPCS | Performed by: NURSE PRACTITIONER

## 2020-02-27 PROCEDURE — G8482 FLU IMMUNIZE ORDER/ADMIN: HCPCS | Performed by: NURSE PRACTITIONER

## 2020-02-27 PROCEDURE — 99214 OFFICE O/P EST MOD 30 MIN: CPT | Performed by: NURSE PRACTITIONER

## 2020-02-27 PROCEDURE — 3017F COLORECTAL CA SCREEN DOC REV: CPT | Performed by: NURSE PRACTITIONER

## 2020-02-27 PROCEDURE — 4004F PT TOBACCO SCREEN RCVD TLK: CPT | Performed by: NURSE PRACTITIONER

## 2020-02-27 RX ORDER — AMLODIPINE BESYLATE 10 MG/1
10 TABLET ORAL DAILY
Qty: 30 TABLET | Refills: 11 | Status: SHIPPED | OUTPATIENT
Start: 2020-02-27 | End: 2021-03-16 | Stop reason: SDUPTHER

## 2020-02-27 RX ORDER — AMLODIPINE BESYLATE 10 MG/1
10 TABLET ORAL DAILY
COMMUNITY
End: 2020-02-27 | Stop reason: SDUPTHER

## 2020-02-27 RX ORDER — TIZANIDINE 4 MG/1
4 TABLET ORAL 3 TIMES DAILY PRN
Qty: 30 TABLET | Refills: 0 | Status: SHIPPED | OUTPATIENT
Start: 2020-02-27 | End: 2020-03-26

## 2020-02-27 RX ORDER — CETIRIZINE HYDROCHLORIDE 10 MG/1
10 TABLET ORAL DAILY
Qty: 30 TABLET | Refills: 11 | Status: SHIPPED | OUTPATIENT
Start: 2020-02-27 | End: 2021-06-10 | Stop reason: SDUPTHER

## 2020-02-27 RX ORDER — PREDNISONE 10 MG/1
TABLET ORAL
Qty: 30 TABLET | Refills: 0 | Status: SHIPPED | OUTPATIENT
Start: 2020-02-27 | End: 2020-03-26

## 2020-02-27 RX ORDER — METOPROLOL TARTRATE AND HYDROCHLOROTHIAZIDE 100; 25 MG/1; MG/1
TABLET ORAL
Qty: 30 TABLET | Refills: 11 | Status: ON HOLD | OUTPATIENT
Start: 2020-02-27 | End: 2021-04-09 | Stop reason: HOSPADM

## 2020-02-27 RX ORDER — TRAMADOL HYDROCHLORIDE 50 MG/1
50 TABLET ORAL EVERY 6 HOURS PRN
Qty: 18 TABLET | Refills: 0 | Status: SHIPPED | OUTPATIENT
Start: 2020-02-27 | End: 2020-03-03

## 2020-02-27 RX ORDER — ATORVASTATIN CALCIUM 10 MG/1
TABLET, FILM COATED ORAL
Qty: 30 TABLET | Refills: 11 | Status: SHIPPED | OUTPATIENT
Start: 2020-02-27 | End: 2021-04-26 | Stop reason: SDUPTHER

## 2020-03-06 ASSESSMENT — ENCOUNTER SYMPTOMS
RESPIRATORY NEGATIVE: 1
TROUBLE SWALLOWING: 0
RECTAL PAIN: 0
ALLERGIC/IMMUNOLOGIC NEGATIVE: 1
EYES NEGATIVE: 1
ABDOMINAL PAIN: 0
BLOOD IN STOOL: 0
DIARRHEA: 0
SHORTNESS OF BREATH: 0
COLOR CHANGE: 0
VOICE CHANGE: 0
ANAL BLEEDING: 0
BACK PAIN: 1
CONSTIPATION: 0
GASTROINTESTINAL NEGATIVE: 1

## 2020-03-06 NOTE — PROGRESS NOTES
Subjective  Geri Haji, 61 y.o. male presents today with:  Chief Complaint   Patient presents with    Check-Up    Hypertension    Hyperlipidemia    Back Pain           HTN-  Not well controlled currently taking  Metoprolol 50mg,  Would like to increase or ad another medication-  Gets headaches when BP is elevated. Hyperlipidemia-  Untreated has been trying dietary changes -  Not working well has  agreed to start Lipitor  ED-  Uses Viagra PRN with good results. He is currently taking all prescribed medications at home. Overall is doing well. Has no other questions or concerns at this time. Hypertension   This is a chronic problem. The problem is unchanged. The problem is controlled. Associated symptoms include headaches (intermittently when BP elevated). Pertinent negatives include no chest pain, palpitations or shortness of breath. Hyperlipidemia   Pertinent negatives include no chest pain or shortness of breath. Back Pain   Associated symptoms include headaches (intermittently when BP elevated). Pertinent negatives include no abdominal pain, chest pain or weakness. Review of Systems   Constitutional: Negative. Negative for activity change, appetite change, fatigue and unexpected weight change. HENT: Negative for dental problem, nosebleeds, trouble swallowing and voice change. Eyes: Negative. Negative for visual disturbance. Respiratory: Negative. Negative for shortness of breath. Cardiovascular: Negative. Negative for chest pain, palpitations and leg swelling. Gastrointestinal: Negative. Negative for abdominal pain, anal bleeding, blood in stool, constipation, diarrhea and rectal pain. Endocrine: Negative. Negative for cold intolerance, heat intolerance, polydipsia, polyphagia and polyuria. Genitourinary: Negative. Musculoskeletal: Positive for back pain. Skin: Negative. Negative for color change and rash. Allergic/Immunologic: Negative. Neurological: Positive for headaches (intermittently when BP elevated). Negative for dizziness, syncope and weakness. Hematological: Negative. Negative for adenopathy. Does not bruise/bleed easily. Psychiatric/Behavioral: Negative. Negative for dysphoric mood and sleep disturbance. The patient is not nervous/anxious. Past Medical History:   Diagnosis Date    Hyperlipidemia     Hypertension     Osteoarthritis     S/P inguinal herniorrhaphy     left     Past Surgical History:   Procedure Laterality Date    HERNIA REPAIR Bilateral     [place] COLON CA SCRN NOT HI RSK IND N/A 10/6/2017    COLONOSCOPY performed by Willy Patel MD at [address] [place] [address]       Social History     Socioeconomic History    Marital status:      Spouse name: Not on file    Number of children: Not on file    Years of education: Not on file    Highest education level: Not on file   Occupational History    Not on file   Social Needs    Financial resource strain: Not on file    Food insecurity:     Worry: Not on file     Inability: Not on file    Transportation needs:     Medical: Not on file     Non-medical: Not on file   Tobacco Use    Smoking status: Current Every Day Smoker     Packs/day: 1.00     Years: 20.00     Pack years: 20.00     Types: Cigarettes    Smokeless tobacco: Never Used   Substance and Sexual Activity    Alcohol use:  Yes     Alcohol/week: 18.0 standard drinks     Types: 18 Cans of beer per week    Drug use: Yes     Types: Marijuana     Comment: 2X/ month    Sexual activity: Not on file     Comment:    Lifestyle    Physical activity:     Days per week: Not on file     Minutes per session: Not on file    Stress: Not on file   Relationships    Social connections:     Talks on phone: Not on file     Gets together: Not on file     Attends Jainism service: Not on file     Active member of club or organization: Not on file     Attends meetings of clubs or organizations: Not on file     Relationship status: Not on file    Intimate partner violence:     Fear of current or ex partner: Not on file     Emotionally abused: Not on file     Physically abused: Not on file     Forced sexual activity: Not on file   Other Topics Concern    Not on file   Social History Narrative    Not on file     No family history on file. No Known Allergies  Current Outpatient Medications   Medication Sig Dispense Refill    metoprolol-hydrochlorothiazide (LOPRESSOR HCT) 100-25 MG per tablet take 1 tablet by mouth once daily 30 tablet 11    cetirizine (ZYRTEC) 10 MG tablet Take 1 tablet by mouth daily 30 tablet 11    atorvastatin (LIPITOR) 10 MG tablet take 1 tablet by mouth once daily 30 tablet 11    amLODIPine (NORVASC) 10 MG tablet Take 1 tablet by mouth daily 30 tablet 11    predniSONE (DELTASONE) 10 MG tablet 4 for 4 days 3 for 3 days 2 for 2 days 1 for 1 day 30 tablet 0    tiZANidine (ZANAFLEX) 4 MG tablet Take 1 tablet by mouth 3 times daily as needed (shoulder spasm) 30 tablet 0     No current facility-administered medications for this visit. PMH, Surgical Hx, Family Hx, and Social Hx reviewed and updated. Health Maintenance reviewed. Objective    Vitals:    02/27/20 1510   BP: 138/84   Pulse: 86   Resp: 16   Weight: 159 lb (72.1 kg)   Height: 5' 5\" (1.651 m)       Physical Exam   Constitutional: He is oriented to person, place, and time. Vital signs are normal. He appears well-developed and well-nourished. He is cooperative. Non-toxic appearance. He does not have a sickly appearance. He does not appear ill. No distress. HENT:   Head: Normocephalic and atraumatic. Right Ear: Tympanic membrane, external ear and ear canal normal.   Left Ear: Tympanic membrane, external ear and ear canal normal.   Nose: Nose normal.   Mouth/Throat: Uvula is midline, oropharynx is clear and moist and mucous membranes are normal.   Initially left TM was occluded with cerumen.  >60.0 >60    Calcium 10.0 (H) 8.5 - 9.9 mg/dL    Total Protein 8.4 (H) 6.3 - 8.0 g/dL    Alb 4.3 3.5 - 4.6 g/dL    Total Bilirubin <0.2 0.2 - 0.7 mg/dL    Alkaline Phosphatase 77 35 - 104 U/L    ALT 25 0 - 41 U/L    AST 24 0 - 40 U/L    Globulin 4.1 (H) 2.3 - 3.5 g/dL   CBC With Auto Differential   Result Value Ref Range    WBC 4.0 (L) 4.8 - 10.8 K/uL    RBC 5.49 4.70 - 6.10 M/uL    Hemoglobin 15.8 14.0 - 18.0 g/dL    Hematocrit 47.7 42.0 - 52.0 %    MCV 86.9 80.0 - 100.0 fL    MCH 28.8 27.0 - 31.3 pg    MCHC 33.1 33.0 - 37.0 %    RDW 14.1 11.5 - 14.5 %    Platelets 409 099 - 356 K/uL    PLATELET SLIDE REVIEW Normal     Neutrophils % 58.0 %    Lymphocytes % 29.0 %    Monocytes % 6.7 %    Eosinophils % 1.0 %    Basophils % 3.0 %    Neutrophils Absolute 2.3 1.4 - 6.5 K/uL    Lymphocytes Absolute 1.3 1.0 - 4.8 K/uL    Monocytes Absolute 0.3 0.2 - 0.8 K/uL    Eosinophils Absolute 0.0 0.0 - 0.7 K/uL    Basophils Absolute 0.1 0.0 - 0.2 K/uL    Atypical Lymphocytes Relative 3 %   PSA Screening   Result Value Ref Range    PSA 5.08 0.00 - 5.40 ng/mL           Assessment & Plan   Elver Adam was seen today for check-up, hypertension, hyperlipidemia and back pain. Diagnoses and all orders for this visit:    Essential hypertension  -     Lipid Panel; Future  -     Comprehensive Metabolic Panel; Future  -     CBC; Future  -     metoprolol-hydrochlorothiazide (LOPRESSOR HCT) 100-25 MG per tablet; take 1 tablet by mouth once daily  -     amLODIPine (NORVASC) 10 MG tablet; Take 1 tablet by mouth daily  -     Perry Keen MD, Cardiology, [facility]    Mixed hyperlipidemia  -     Lipid Panel; Future  -     Comprehensive Metabolic Panel; Future  -     CBC;  Future  -     atorvastatin (LIPITOR) 10 MG tablet; take 1 tablet by mouth once daily  -     Perry Keen MD, Cardiology, [facility]    Acute bilateral low back pain without sciatica    Prostate cancer screening  -     PSA Screening; Future    Incidental lung nodule 2mm  Comments:  needs repeat scan Feb 2021    Acute pain of left shoulder  -     XR SHOULDER LEFT (MIN 2 VIEWS); Future  -     traMADol (ULTRAM) 50 MG tablet; Take 1 tablet by mouth every 6 hours as needed for Pain for up to 5 days. Smoker  -     Greg Corcoran MD, Cardiology, [facility]    Other orders  -     cetirizine (ZYRTEC) 10 MG tablet; Take 1 tablet by mouth daily  -     predniSONE (DELTASONE) 10 MG tablet; 4 for 4 days 3 for 3 days 2 for 2 days 1 for 1 day  -     tiZANidine (ZANAFLEX) 4 MG tablet;  Take 1 tablet by mouth 3 times daily as needed (shoulder spasm)      Orders Placed This Encounter   Procedures    XR SHOULDER LEFT (MIN 2 VIEWS)     Standing Status:   Future     Number of Occurrences:   1     Standing Expiration Date:   2/27/2021     Order Specific Question:   Reason for exam:     Answer:   shoulder  pain    Lipid Panel     Standing Status:   Future     Standing Expiration Date:   2/27/2021     Order Specific Question:   Is Patient Fasting?/# of Hours     Answer:   9    Comprehensive Metabolic Panel     Standing Status:   Future     Standing Expiration Date:   2/27/2021    CBC     Standing Status:   Future     Standing Expiration Date:   2/27/2021    PSA Screening     Standing Status:   Future     Standing Expiration Date:   2/27/2021   Greg Corcoran MD, Cardiology, [facility]     Referral Priority:   Routine     Referral Type:   Eval and Treat     Referral Reason:   Specialty Services Required     Referred to Provider:   Rachael Musa MD     Requested Specialty:   Cardiology     Number of Visits Requested:   1     Orders Placed This Encounter   Medications    metoprolol-hydrochlorothiazide (LOPRESSOR HCT) 100-25 MG per tablet     Sig: take 1 tablet by mouth once daily     Dispense:  30 tablet     Refill:  11    cetirizine (ZYRTEC) 10 MG tablet     Sig: Take 1 tablet by mouth daily     Dispense:  30 tablet     Refill: 11    atorvastatin (LIPITOR) 10 MG tablet     Sig: take 1 tablet by mouth once daily     Dispense:  30 tablet     Refill:  11    amLODIPine (NORVASC) 10 MG tablet     Sig: Take 1 tablet by mouth daily     Dispense:  30 tablet     Refill:  11    traMADol (ULTRAM) 50 MG tablet     Sig: Take 1 tablet by mouth every 6 hours as needed for Pain for up to 5 days. Dispense:  18 tablet     Refill:  0     Reduce doses taken as pain becomes manageable    predniSONE (DELTASONE) 10 MG tablet     Si for 4 days 3 for 3 days 2 for 2 days 1 for 1 day     Dispense:  30 tablet     Refill:  0    tiZANidine (ZANAFLEX) 4 MG tablet     Sig: Take 1 tablet by mouth 3 times daily as needed (shoulder spasm)     Dispense:  30 tablet     Refill:  0     Medications Discontinued During This Encounter   Medication Reason    metoprolol tartrate (LOPRESSOR) 50 MG tablet ERROR    sildenafil (VIAGRA) 50 MG tablet ERROR    metoprolol-hydrochlorothiazide (LOPRESSOR HCT) 100-25 MG per tablet REORDER    cetirizine (ZYRTEC) 10 MG tablet REORDER    atorvastatin (LIPITOR) 10 MG tablet REORDER    amLODIPine (NORVASC) 10 MG tablet REORDER     Return in about 4 weeks (around 3/26/2020). Reviewed with the patient: current clinical status, medications, activities and diet. Side effects, adverse effects of the medication prescribed today, as well as treatment plan/ rationale and result expectations have been discussed with the patient who expresses understanding and desires to proceed. Close follow up to evaluate treatment results and for coordination of care. I have reviewed the patient's medical history in detail and updated the computerized patient record.     Paulina Silverman, FRANCOIS - CNP

## 2020-03-26 ENCOUNTER — VIRTUAL VISIT (OUTPATIENT)
Dept: FAMILY MEDICINE CLINIC | Age: 60
End: 2020-03-26
Payer: COMMERCIAL

## 2020-03-26 VITALS — SYSTOLIC BLOOD PRESSURE: 132 MMHG | DIASTOLIC BLOOD PRESSURE: 80 MMHG

## 2020-03-26 PROCEDURE — G8482 FLU IMMUNIZE ORDER/ADMIN: HCPCS | Performed by: NURSE PRACTITIONER

## 2020-03-26 PROCEDURE — 4004F PT TOBACCO SCREEN RCVD TLK: CPT | Performed by: NURSE PRACTITIONER

## 2020-03-26 PROCEDURE — 3017F COLORECTAL CA SCREEN DOC REV: CPT | Performed by: NURSE PRACTITIONER

## 2020-03-26 PROCEDURE — G8419 CALC BMI OUT NRM PARAM NOF/U: HCPCS | Performed by: NURSE PRACTITIONER

## 2020-03-26 PROCEDURE — 99214 OFFICE O/P EST MOD 30 MIN: CPT | Performed by: NURSE PRACTITIONER

## 2020-03-26 PROCEDURE — G8427 DOCREV CUR MEDS BY ELIG CLIN: HCPCS | Performed by: NURSE PRACTITIONER

## 2020-03-26 ASSESSMENT — PATIENT HEALTH QUESTIONNAIRE - PHQ9
SUM OF ALL RESPONSES TO PHQ9 QUESTIONS 1 & 2: 0
SUM OF ALL RESPONSES TO PHQ QUESTIONS 1-9: 0
SUM OF ALL RESPONSES TO PHQ QUESTIONS 1-9: 0
1. LITTLE INTEREST OR PLEASURE IN DOING THINGS: 0
2. FEELING DOWN, DEPRESSED OR HOPELESS: 0

## 2020-03-26 ASSESSMENT — ENCOUNTER SYMPTOMS
SHORTNESS OF BREATH: 0
ABDOMINAL PAIN: 0
BACK PAIN: 1

## 2020-03-26 NOTE — PROGRESS NOTES
Subjective  Claus Fernando, 61 y.o. male presents today with:  No chief complaint on file. HTN-  Not well controlled currently taking  Metoprolol 50mg,  Would like to increase or ad another medication-  Gets headaches when BP is elevated. Hyperlipidemia-  Untreated has been trying dietary changes -  Not working well has  agreed to start Lipitor  ED-  Uses Viagra PRN with good results. He is currently taking all prescribed medications at home. Overall is doing well. Has no other questions or concerns at this time. Hypertension   This is a chronic problem. The problem is unchanged. The problem is controlled. Associated symptoms include headaches (intermittently when BP elevated). Pertinent negatives include no chest pain, palpitations or shortness of breath. Hyperlipidemia   Pertinent negatives include no chest pain or shortness of breath. Back Pain   Associated symptoms include headaches (intermittently when BP elevated). Pertinent negatives include no abdominal pain, chest pain or weakness. Review of Systems   Respiratory: Negative for shortness of breath. Cardiovascular: Negative for chest pain and palpitations. Gastrointestinal: Negative for abdominal pain. Musculoskeletal: Positive for back pain. Neurological: Positive for headaches (intermittently when BP elevated). Negative for weakness.        Past Medical History:   Diagnosis Date    Hyperlipidemia     Hypertension     Osteoarthritis     S/P inguinal herniorrhaphy     left     Past Surgical History:   Procedure Laterality Date    HERNIA REPAIR Bilateral     [place] COLON CA SCRN NOT HI RSK IND N/A 10/6/2017    COLONOSCOPY performed by Perla Lora MD at [address] [place] [address]       Social History     Socioeconomic History    Marital status:      Spouse name: Not on file    Number of children: Not on file    Years of education: Not on file    Highest education level: Not on file   Occupational History    Not on file   Social Needs    Financial resource strain: Not on file    Food insecurity     Worry: Not on file     Inability: Not on file    Transportation needs     Medical: Not on file     Non-medical: Not on file   Tobacco Use    Smoking status: Current Every Day Smoker     Packs/day: 1.00     Years: 20.00     Pack years: 20.00     Types: Cigarettes    Smokeless tobacco: Never Used   Substance and Sexual Activity    Alcohol use: Yes     Alcohol/week: 18.0 standard drinks     Types: 18 Cans of beer per week    Drug use: Yes     Types: Marijuana     Comment: 2X/ month    Sexual activity: Not on file     Comment:    Lifestyle    Physical activity     Days per week: Not on file     Minutes per session: Not on file    Stress: Not on file   Relationships    Social connections     Talks on phone: Not on file     Gets together: Not on file     Attends Faith service: Not on file     Active member of club or organization: Not on file     Attends meetings of clubs or organizations: Not on file     Relationship status: Not on file    Intimate partner violence     Fear of current or ex partner: Not on file     Emotionally abused: Not on file     Physically abused: Not on file     Forced sexual activity: Not on file   Other Topics Concern    Not on file   Social History Narrative    Not on file     No family history on file. No Known Allergies  Current Outpatient Medications   Medication Sig Dispense Refill    metoprolol-hydrochlorothiazide (LOPRESSOR HCT) 100-25 MG per tablet take 1 tablet by mouth once daily 30 tablet 11    cetirizine (ZYRTEC) 10 MG tablet Take 1 tablet by mouth daily 30 tablet 11    atorvastatin (LIPITOR) 10 MG tablet take 1 tablet by mouth once daily 30 tablet 11    amLODIPine (NORVASC) 10 MG tablet Take 1 tablet by mouth daily 30 tablet 11     No current facility-administered medications for this visit.       PMH, Surgical Hx, Family Hx,

## 2020-10-30 ENCOUNTER — HOSPITAL ENCOUNTER (EMERGENCY)
Age: 60
Discharge: HOME | End: 2020-10-30
Payer: COMMERCIAL

## 2020-10-30 ENCOUNTER — APPOINTMENT (OUTPATIENT)
Dept: CT IMAGING | Age: 60
End: 2020-10-30
Payer: COMMERCIAL

## 2020-10-30 VITALS
OXYGEN SATURATION: 95 % | HEIGHT: 65 IN | HEART RATE: 70 BPM | DIASTOLIC BLOOD PRESSURE: 83 MMHG | RESPIRATION RATE: 18 BRPM | WEIGHT: 165 LBS | SYSTOLIC BLOOD PRESSURE: 163 MMHG | TEMPERATURE: 98.3 F | BODY MASS INDEX: 27.49 KG/M2

## 2020-10-30 PROCEDURE — 74177 CT ABD & PELVIS W/CONTRAST: CPT

## 2020-10-30 PROCEDURE — 72128 CT CHEST SPINE W/O DYE: CPT

## 2020-10-30 PROCEDURE — 6370000000 HC RX 637 (ALT 250 FOR IP): Performed by: PHYSICIAN ASSISTANT

## 2020-10-30 PROCEDURE — 72125 CT NECK SPINE W/O DYE: CPT

## 2020-10-30 PROCEDURE — 6360000004 HC RX CONTRAST MEDICATION: Performed by: PHYSICIAN ASSISTANT

## 2020-10-30 PROCEDURE — 99284 EMERGENCY DEPT VISIT MOD MDM: CPT

## 2020-10-30 PROCEDURE — 72131 CT LUMBAR SPINE W/O DYE: CPT

## 2020-10-30 RX ORDER — HYDROCODONE BITARTRATE AND ACETAMINOPHEN 5; 325 MG/1; MG/1
1 TABLET ORAL EVERY 6 HOURS PRN
Qty: 10 TABLET | Refills: 0 | Status: SHIPPED | OUTPATIENT
Start: 2020-10-30 | End: 2020-11-02

## 2020-10-30 RX ORDER — NAPROXEN 500 MG/1
500 TABLET ORAL 2 TIMES DAILY
Qty: 20 TABLET | Refills: 0 | Status: SHIPPED | OUTPATIENT
Start: 2020-10-30 | End: 2021-03-09

## 2020-10-30 RX ORDER — HYDROCODONE BITARTRATE AND ACETAMINOPHEN 5; 325 MG/1; MG/1
1 TABLET ORAL ONCE
Status: COMPLETED | OUTPATIENT
Start: 2020-10-30 | End: 2020-10-30

## 2020-10-30 RX ADMIN — HYDROCODONE BITARTRATE AND ACETAMINOPHEN 1 TABLET: 5; 325 TABLET ORAL at 13:25

## 2020-10-30 RX ADMIN — IOPAMIDOL 100 ML: 612 INJECTION, SOLUTION INTRAVENOUS at 14:48

## 2020-10-30 ASSESSMENT — PAIN DESCRIPTION - FREQUENCY
FREQUENCY: CONTINUOUS
FREQUENCY: CONTINUOUS

## 2020-10-30 ASSESSMENT — PAIN DESCRIPTION - LOCATION
LOCATION: ABDOMEN;BACK
LOCATION: ABDOMEN;BACK;NECK

## 2020-10-30 ASSESSMENT — ENCOUNTER SYMPTOMS
EYE PAIN: 0
RHINORRHEA: 0
NAUSEA: 0
PHOTOPHOBIA: 0
VOMITING: 0
SORE THROAT: 0
ABDOMINAL PAIN: 1
COUGH: 0
DIARRHEA: 0
BACK PAIN: 1
SHORTNESS OF BREATH: 0

## 2020-10-30 ASSESSMENT — PAIN DESCRIPTION - DESCRIPTORS: DESCRIPTORS: SHOOTING

## 2020-10-30 ASSESSMENT — PAIN SCALES - GENERAL
PAINLEVEL_OUTOF10: 6
PAINLEVEL_OUTOF10: 5
PAINLEVEL_OUTOF10: 6

## 2020-10-30 ASSESSMENT — PAIN DESCRIPTION - PAIN TYPE: TYPE: ACUTE PAIN
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
## 2020-10-31 LAB
GFR AFRICAN AMERICAN: >60
GFR NON-AFRICAN AMERICAN: >60
PERFORMED ON: NORMAL
POC CREATININE: 1 MG/DL (ref 0.8–1.3)
POC SAMPLE TYPE: NORMAL

## 2021-02-13 ENCOUNTER — APPOINTMENT (OUTPATIENT)
Dept: CT IMAGING | Age: 61
End: 2021-02-13
Payer: COMMERCIAL

## 2021-02-13 ENCOUNTER — HOSPITAL ENCOUNTER (EMERGENCY)
Age: 61
Discharge: HOME | End: 2021-02-13
Attending: EMERGENCY MEDICINE
Payer: COMMERCIAL

## 2021-02-13 VITALS
WEIGHT: 130 LBS | TEMPERATURE: 97.5 F | SYSTOLIC BLOOD PRESSURE: 120 MMHG | DIASTOLIC BLOOD PRESSURE: 90 MMHG | BODY MASS INDEX: 21.66 KG/M2 | HEART RATE: 93 BPM | RESPIRATION RATE: 17 BRPM | HEIGHT: 65 IN | OXYGEN SATURATION: 98 %
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
## 2021-02-13 LAB
ALBUMIN SERPL-MCNC: 3.3 G/DL (ref 3.5–4.6)
ALP BLD-CCNC: 105 U/L (ref 35–104)
ALT SERPL-CCNC: 44 U/L (ref 0–41)
ANION GAP SERPL CALCULATED.3IONS-SCNC: 11 MEQ/L (ref 9–15)
APTT: 37.8 SEC (ref 24.4–36.8)
AST SERPL-CCNC: 25 U/L (ref 0–40)
BASOPHILS ABSOLUTE: 0.1 K/UL (ref 0–0.2)
BASOPHILS RELATIVE PERCENT: 0.7 %
BILIRUB SERPL-MCNC: <0.2 MG/DL (ref 0.2–0.7)
BUN BLDV-MCNC: 18 MG/DL (ref 8–23)
CALCIUM SERPL-MCNC: 8.9 MG/DL (ref 8.5–9.9)
CHLORIDE BLD-SCNC: 104 MEQ/L (ref 95–107)
CO2: 25 MEQ/L (ref 20–31)
CREAT SERPL-MCNC: 0.57 MG/DL (ref 0.7–1.2)
EOSINOPHILS ABSOLUTE: 0.6 K/UL (ref 0–0.7)
EOSINOPHILS RELATIVE PERCENT: 7.8 %
ETHANOL PERCENT: NORMAL G/DL
ETHANOL: <10 MG/DL (ref 0–0.08)
GFR AFRICAN AMERICAN: >60
GFR AFRICAN AMERICAN: >60
GFR NON-AFRICAN AMERICAN: >60
GFR NON-AFRICAN AMERICAN: >60
GLOBULIN: 4.2 G/DL (ref 2.3–3.5)
GLUCOSE BLD-MCNC: 105 MG/DL (ref 70–99)
HCT VFR BLD CALC: 27.6 % (ref 42–52)
HEMOGLOBIN: 8.9 G/DL (ref 14–18)
INR BLD: 2.5
LYMPHOCYTES ABSOLUTE: 0.9 K/UL (ref 1–4.8)
LYMPHOCYTES RELATIVE PERCENT: 11.9 %
MCH RBC QN AUTO: 25.9 PG (ref 27–31.3)
MCHC RBC AUTO-ENTMCNC: 32.4 % (ref 33–37)
MCV RBC AUTO: 80 FL (ref 80–100)
MONOCYTES ABSOLUTE: 0.5 K/UL (ref 0.2–0.8)
MONOCYTES RELATIVE PERCENT: 6.6 %
NEUTROPHILS ABSOLUTE: 5.7 K/UL (ref 1.4–6.5)
NEUTROPHILS RELATIVE PERCENT: 73 %
PDW BLD-RTO: 15 % (ref 11.5–14.5)
PERFORMED ON: NORMAL
PLATELET # BLD: 365 K/UL (ref 130–400)
POC CREATININE WHOLE BLOOD: 0.8
POC CREATININE: 0.8 MG/DL (ref 0.8–1.3)
POC SAMPLE TYPE: NORMAL
POTASSIUM SERPL-SCNC: 4.1 MEQ/L (ref 3.4–4.9)
PROTHROMBIN TIME: 27.3 SEC (ref 12.3–14.9)
RBC # BLD: 3.45 M/UL (ref 4.7–6.1)
SODIUM BLD-SCNC: 140 MEQ/L (ref 135–144)
TOTAL PROTEIN: 7.5 G/DL (ref 6.3–8)
WBC # BLD: 7.8 K/UL (ref 4.8–10.8)

## 2021-02-13 PROCEDURE — 36415 COLL VENOUS BLD VENIPUNCTURE: CPT

## 2021-02-13 PROCEDURE — 70450 CT HEAD/BRAIN W/O DYE: CPT

## 2021-02-13 PROCEDURE — 99285 EMERGENCY DEPT VISIT HI MDM: CPT

## 2021-02-13 PROCEDURE — 6360000004 HC RX CONTRAST MEDICATION: Performed by: PERSONAL EMERGENCY RESPONSE ATTENDANT

## 2021-02-13 PROCEDURE — 6360000002 HC RX W HCPCS: Performed by: PERSONAL EMERGENCY RESPONSE ATTENDANT

## 2021-02-13 PROCEDURE — 85025 COMPLETE CBC W/AUTO DIFF WBC: CPT

## 2021-02-13 PROCEDURE — 96374 THER/PROPH/DIAG INJ IV PUSH: CPT

## 2021-02-13 PROCEDURE — 85730 THROMBOPLASTIN TIME PARTIAL: CPT

## 2021-02-13 PROCEDURE — 74177 CT ABD & PELVIS W/CONTRAST: CPT

## 2021-02-13 PROCEDURE — 82077 ASSAY SPEC XCP UR&BREATH IA: CPT

## 2021-02-13 PROCEDURE — 80053 COMPREHEN METABOLIC PANEL: CPT

## 2021-02-13 PROCEDURE — 6830039000 HC L3 TRAUMA ALERT

## 2021-02-13 PROCEDURE — 85610 PROTHROMBIN TIME: CPT

## 2021-02-13 PROCEDURE — 72125 CT NECK SPINE W/O DYE: CPT

## 2021-02-13 PROCEDURE — 72131 CT LUMBAR SPINE W/O DYE: CPT

## 2021-02-13 RX ORDER — ONDANSETRON 2 MG/ML
4 INJECTION INTRAMUSCULAR; INTRAVENOUS ONCE
Status: COMPLETED | OUTPATIENT
Start: 2021-02-13 | End: 2021-02-13

## 2021-02-13 RX ORDER — MORPHINE SULFATE 2 MG/ML
4 INJECTION, SOLUTION INTRAMUSCULAR; INTRAVENOUS ONCE
Status: DISCONTINUED | OUTPATIENT
Start: 2021-02-13 | End: 2021-02-13 | Stop reason: HOSPADM

## 2021-02-13 RX ADMIN — ONDANSETRON 4 MG: 2 INJECTION INTRAMUSCULAR; INTRAVENOUS at 03:04

## 2021-02-13 RX ADMIN — IOPAMIDOL 100 ML: 612 INJECTION, SOLUTION INTRAVENOUS at 02:47

## 2021-02-13 ASSESSMENT — ENCOUNTER SYMPTOMS
RHINORRHEA: 0
COUGH: 0
COLOR CHANGE: 0
DIARRHEA: 0
ABDOMINAL PAIN: 0
BACK PAIN: 1
SORE THROAT: 0
BLOOD IN STOOL: 0
NAUSEA: 0
SHORTNESS OF BREATH: 0
VOMITING: 0

## 2021-02-13 NOTE — ED PROVIDER NOTES
[address] [facility] ED  eMERGENCY dEPARTMENT eNCOUnter      Pt Name: Thiago Smiley  MRN: [id number]  [place] 1960  Date of evaluation: 2/13/2021  Provider: KEYSHAWN Mo      HISTORY OF PRESENT ILLNESS    Thiago Smiley is a 64 y.o. male with PMHx of hyperlipidemia, hypertension, CVA, dysphagia, cerebral edema, extradural subdural abscess with craniotomy, tachycardia presents to the emergency department with fall. Pt is from [facility], Full Code. He wears a helmet d/t hx of craniotomy d/t brain abscess. Staff heard the patient fall out of bed and went into room and found him on the floor. Helmet slid off to side. No LOC. Pt is on coumadin, INR 2/12 was 3.3. Pt complains of chronic left sided back pain. PEG tube was yanked on without being pulled out or obvious signs of trauma. Pt does complain of chronic abdominal pain surrounding PEG tube. He denies HA, neck pain, CP, SOB, nausea, vomiting, extremity pain. HPI    Nursing Notes were reviewed. REVIEW OF SYSTEMS       Review of Systems   Constitutional: Negative for appetite change, chills and fever. HENT: Negative for congestion, rhinorrhea and sore throat. Respiratory: Negative for cough and shortness of breath. Cardiovascular: Negative for chest pain. Gastrointestinal: Negative for abdominal pain, blood in stool, diarrhea, nausea and vomiting. Genitourinary: Negative for difficulty urinating. Musculoskeletal: Positive for back pain. Negative for neck stiffness. Skin: Negative for color change and rash. Neurological: Negative for dizziness, syncope, weakness, light-headedness, numbness and headaches. All other systems reviewed and are negative.             PAST MEDICAL HISTORY     Past Medical History:   Diagnosis Date    Hyperlipidemia     Hypertension     Osteoarthritis     S/P inguinal herniorrhaphy     left         SURGICAL HISTORY       Past Surgical History:   Procedure Laterality Date  HERNIA REPAIR Bilateral     CA COLON CA SCRN NOT HI RSK IND N/A 10/6/2017    COLONOSCOPY performed by Wanda Alexandre MD at [address] [place] [address]       Previous Medications    AMLODIPINE (NORVASC) 10 MG TABLET    Take 1 tablet by mouth daily    ATORVASTATIN (LIPITOR) 10 MG TABLET    take 1 tablet by mouth once daily    CETIRIZINE (ZYRTEC) 10 MG TABLET    Take 1 tablet by mouth daily    METOPROLOL-HYDROCHLOROTHIAZIDE (LOPRESSOR HCT) 100-25 MG PER TABLET    take 1 tablet by mouth once daily    NAPROXEN (NAPROSYN) 500 MG TABLET    Take 1 tablet by mouth 2 times daily for 20 doses       ALLERGIES     Patient has no known allergies. FAMILY HISTORY     No family history on file. SOCIAL HISTORY       Social History     Socioeconomic History    Marital status:      Spouse name: Not on file    Number of children: Not on file    Years of education: Not on file    Highest education level: Not on file   Occupational History    Not on file   Social Needs    Financial resource strain: Not on file    Food insecurity     Worry: Not on file     Inability: Not on file    Transportation needs     Medical: Not on file     Non-medical: Not on file   Tobacco Use    Smoking status: Current Every Day Smoker     Packs/day: 1.00     Years: 20.00     Pack years: 20.00     Types: Cigarettes    Smokeless tobacco: Never Used   Substance and Sexual Activity    Alcohol use:  Yes     Alcohol/week: 18.0 standard drinks     Types: 18 Cans of beer per week    Drug use: Yes     Types: Marijuana     Comment: 2X/ month    Sexual activity: Not on file     Comment:    Lifestyle    Physical activity     Days per week: Not on file     Minutes per session: Not on file    Stress: Not on file   Relationships    Social connections     Talks on phone: Not on file     Gets together: Not on file     Attends Denominational service: Not on file Capillary Refill: Capillary refill takes less than 2 seconds. Findings: No rash. Neurological:      Mental Status: He is alert and oriented to person, place, and time. Deep Tendon Reflexes: Reflexes are normal and symmetric. Psychiatric:         Behavior: Behavior normal.         Thought Content:  Thought content normal.         Judgment: Judgment normal.         DIAGNOSTIC RESULTS     EKG:All EKG's are interpreted by the Emergency Department Physician who either signs or Co-signs this chart in the absence of a cardiologist.        RADIOLOGY:   Non-plain film images such as CT, Ultrasound and MRI are read by theradiologist. Plain radiographic images are visualized and preliminarily interpreted by the emergency physician with the below findings:    Interpretation per theRadiologist below, if available at the time of this note:    CT Head WO Contrast    (Results Pending)   CT CERVICAL SPINE WO CONTRAST    (Results Pending)   CT ABDOMEN PELVIS W IV CONTRAST Additional Contrast? None    (Results Pending)   CT LUMBAR SPINE WO CONTRAST    (Results Pending)           LABS:  Labs Reviewed   APTT - Abnormal; Notable for the following components:       Result Value    aPTT 37.8 (*)     All other components within normal limits   CBC WITH AUTO DIFFERENTIAL - Abnormal; Notable for the following components:    RBC 3.45 (*)     Hemoglobin 8.9 (*)     Hematocrit 27.6 (*)     MCH 25.9 (*)     MCHC 32.4 (*)     RDW 15.0 (*)     Lymphocytes Absolute 0.9 (*)     All other components within normal limits   COMPREHENSIVE METABOLIC PANEL - Abnormal; Notable for the following components:    Glucose 105 (*)     CREATININE 0.57 (*)     Albumin 3.3 (*)     Alkaline Phosphatase 105 (*)     ALT 44 (*)     Globulin 4.2 (*)     All other components within normal limits   PROTIME-INR - Abnormal; Notable for the following components:    Protime 27.3 (*)     All other components within normal limits POCT CREATININE - URINE - Normal   ETHANOL   TYPE AND SCREEN       All other labs were within normal range or not returned as of this dictation. EMERGENCY DEPARTMENT COURSE and DIFFERENTIAL DIAGNOSIS/MDM:   Vitals:    Vitals:    02/13/21 0200 02/13/21 0215 02/13/21 0230 02/13/21 0300   BP: (!) 154/135  (!) 147/119 136/77   Pulse: 116 107 102 93   Resp: 14 15 11 10   Temp:       TempSrc:       SpO2: 100% 100% 99% 95%   Weight:       Height:             MDM    CT of head, cervical spine, lumbar spine, and abdomen showed no acute process. PEG tube is in proper placement. RBC 3.45, hemoglobin 8.9, hematocrit 27.6. Patient has had minimal physical complaints on his visit, sleeping comfortably in the cart. Standard anticipatory guidance given to patient upon discharge. Have given them a specific time frame in which to follow-up and who to follow-up with. I have also advised them that they should return to the emergency department if they get worse, or not getting better or develop any new or concerning symptoms. Patient demonstrates understanding. CRITICAL CARE TIME   Total Critical Caretime was 0 minutes, excluding separately reportable procedures. There was a high probability of clinically significant/life threatening deterioration in the patient's condition which required my urgent intervention. Procedures    FINAL IMPRESSION      1. Fall, initial encounter          DISPOSITION/PLAN   DISPOSITION Decision To Discharge 02/13/2021 03:38:50 AM      PATIENT REFERRED TO:  FRANCOIS Mojica CNP  [address] [place], [place] [id number]    In 3 days        DISCHARGE MEDICATIONS:  New Prescriptions    No medications on file          (Please notethat portions of this note were completed with a voice recognition program.  Efforts were made to edit the dictations but occasionally words are mis-transcribed. )    KEYSHAWN Luong (electronically signed)  Emergency Physician Assistant Jaiden Flynn, [address]  02/13/21 7614

## 2021-02-13 NOTE — ED NOTES
Resting comfortably, currently in no distress.  Waiting for [facility] to pick him up     Adrián Howell RN  02/13/21 8971

## 2021-02-25 ENCOUNTER — TELEPHONE (OUTPATIENT)
Dept: FAMILY MEDICINE CLINIC | Age: 61
End: 2021-02-25

## 2021-02-25 NOTE — TELEPHONE ENCOUNTER
Jackie Edwards calls he is having terrible muscle spasms in left thigh and calf and left forearm and shoulder. Can you order something for him. He is crying it hurts and it happens several times an hour. Please send to [facility].

## 2021-03-05 ENCOUNTER — TELEPHONE (OUTPATIENT)
Dept: FAMILY MEDICINE CLINIC | Age: 61
End: 2021-03-05

## 2021-03-05 NOTE — TELEPHONE ENCOUNTER
Patients speech therapist Mayank Pham called asking if patient could have his peg tube removed. She said he has a really good appetite. & right now he is on a continuous drip.      Please advise thank you

## 2021-03-05 NOTE — TELEPHONE ENCOUNTER
I have not seen this gentleman in almost a year. Can we please do a virtual or in person appointment first so I can assess him prior to this decision?  Add him any day next week that works with his schedule

## 2021-03-09 ENCOUNTER — OFFICE VISIT (OUTPATIENT)
Dept: FAMILY MEDICINE CLINIC | Age: 61
End: 2021-03-09
Payer: COMMERCIAL

## 2021-03-09 VITALS
HEART RATE: 102 BPM | RESPIRATION RATE: 18 BRPM | TEMPERATURE: 97 F | OXYGEN SATURATION: 96 % | DIASTOLIC BLOOD PRESSURE: 78 MMHG | SYSTOLIC BLOOD PRESSURE: 122 MMHG

## 2021-03-09 DIAGNOSIS — Z98.890 STATUS POST CRANIECTOMY: ICD-10-CM

## 2021-03-09 DIAGNOSIS — I69.391 DYSPHAGIA DUE TO RECENT CEREBRAL INFARCTION: ICD-10-CM

## 2021-03-09 DIAGNOSIS — I10 ESSENTIAL HYPERTENSION: ICD-10-CM

## 2021-03-09 DIAGNOSIS — E78.2 MIXED HYPERLIPIDEMIA: ICD-10-CM

## 2021-03-09 DIAGNOSIS — G47.09 TROUBLE GETTING TO SLEEP: ICD-10-CM

## 2021-03-09 DIAGNOSIS — I63.10 CEREBROVASCULAR ACCIDENT (CVA) DUE TO EMBOLISM OF PRECEREBRAL ARTERY (HCC): Primary | ICD-10-CM

## 2021-03-09 DIAGNOSIS — I63.9 MOOD DISORDER DUE TO ACUTE STROKE (HCC): ICD-10-CM

## 2021-03-09 DIAGNOSIS — G83.9 PARALYSIS (HCC): ICD-10-CM

## 2021-03-09 DIAGNOSIS — Z93.1 PEG (PERCUTANEOUS ENDOSCOPIC GASTROSTOMY) STATUS (HCC): ICD-10-CM

## 2021-03-09 DIAGNOSIS — F06.30 MOOD DISORDER DUE TO ACUTE STROKE (HCC): ICD-10-CM

## 2021-03-09 DIAGNOSIS — G81.94 LEFT HEMIPARESIS (HCC): ICD-10-CM

## 2021-03-09 PROCEDURE — 99215 OFFICE O/P EST HI 40 MIN: CPT | Performed by: PHYSICIAN ASSISTANT

## 2021-03-09 PROCEDURE — G8427 DOCREV CUR MEDS BY ELIG CLIN: HCPCS | Performed by: PHYSICIAN ASSISTANT

## 2021-03-09 PROCEDURE — 3017F COLORECTAL CA SCREEN DOC REV: CPT | Performed by: PHYSICIAN ASSISTANT

## 2021-03-09 PROCEDURE — G8420 CALC BMI NORM PARAMETERS: HCPCS | Performed by: PHYSICIAN ASSISTANT

## 2021-03-09 PROCEDURE — G8482 FLU IMMUNIZE ORDER/ADMIN: HCPCS | Performed by: PHYSICIAN ASSISTANT

## 2021-03-09 PROCEDURE — 1036F TOBACCO NON-USER: CPT | Performed by: PHYSICIAN ASSISTANT

## 2021-03-09 RX ORDER — OXYCODONE HCL 5 MG/5 ML
SOLUTION, ORAL ORAL
COMMUNITY
Start: 2021-03-08

## 2021-03-09 RX ORDER — OFLOXACIN 3 MG/ML
SOLUTION/ DROPS OPHTHALMIC
COMMUNITY
Start: 2021-02-22

## 2021-03-09 RX ORDER — GABAPENTIN 100 MG/1
100 CAPSULE ORAL 3 TIMES DAILY
Qty: 90 CAPSULE | Refills: 2 | Status: SHIPPED | OUTPATIENT
Start: 2021-03-09 | End: 2021-06-05

## 2021-03-09 RX ORDER — ASPIRIN 81 MG/1
81 TABLET, CHEWABLE ORAL DAILY
COMMUNITY

## 2021-03-09 RX ORDER — UREA 10 %
10 LOTION (ML) TOPICAL NIGHTLY PRN
COMMUNITY

## 2021-03-09 SDOH — ECONOMIC STABILITY: FOOD INSECURITY: WITHIN THE PAST 12 MONTHS, THE FOOD YOU BOUGHT JUST DIDN'T LAST AND YOU DIDN'T HAVE MONEY TO GET MORE.: NOT ASKED

## 2021-03-09 SDOH — ECONOMIC STABILITY: INCOME INSECURITY: HOW HARD IS IT FOR YOU TO PAY FOR THE VERY BASICS LIKE FOOD, HOUSING, MEDICAL CARE, AND HEATING?: NOT VERY HARD

## 2021-03-09 SDOH — ECONOMIC STABILITY: TRANSPORTATION INSECURITY
IN THE PAST 12 MONTHS, HAS THE LACK OF TRANSPORTATION KEPT YOU FROM MEDICAL APPOINTMENTS OR FROM GETTING MEDICATIONS?: NOT ASKED

## 2021-03-09 ASSESSMENT — PATIENT HEALTH QUESTIONNAIRE - PHQ9
2. FEELING DOWN, DEPRESSED OR HOPELESS: 0
SUM OF ALL RESPONSES TO PHQ QUESTIONS 1-9: 0
1. LITTLE INTEREST OR PLEASURE IN DOING THINGS: 0
SUM OF ALL RESPONSES TO PHQ QUESTIONS 1-9: 0
SUM OF ALL RESPONSES TO PHQ QUESTIONS 1-9: 0

## 2021-03-09 NOTE — Clinical Note
I placed urgent referral for patient to establish. Not sure if he can be worked in to see Dr. Chivo Godinez in the next 4-6 weeks? THANK YOU!

## 2021-03-09 NOTE — PROGRESS NOTES
sit/transfer/ambulate on his own. High fall risk. Has helmet that he wears for fall precaution. Needs new helmet as it is too loose/big. Family is concerned about patient's pain level given history of stroke and level of muscle spasm. Will cry out in discomfort several times/day. Was at ED recently for a fall at [facility]. No falls since returning home. Dysphagia. Due to recent CVA. Receiving home speech therapy. Has PEG with nocturnal feedings. Runs at 55 cc/hour x 12 hours. Sister doesn't remember G-tube formula. All medications given via PEG. Sister is flushing tube with 200 ml QID. Having 2 soft, BMs daily. Risk of aspiration, but is doing well with swallowing. Eating food/liquids throughout the day. HTN. Controlled today. Currently on amlodipine, lisinopril and lopressor. Review of Systems   Constitutional: Positive for activity change. Negative for appetite change, chills, diaphoresis, fatigue, fever and unexpected weight change. HENT: Positive for trouble swallowing and voice change. Negative for congestion and facial swelling. Respiratory: Negative for cough, chest tightness, shortness of breath and wheezing. Cardiovascular: Negative for chest pain, palpitations and leg swelling. Gastrointestinal: Negative for abdominal distention, abdominal pain, constipation, diarrhea and vomiting. Musculoskeletal: Positive for arthralgias, gait problem and myalgias. Skin: Negative for color change, rash and wound. Neurological: Positive for tremors, facial asymmetry, speech difficulty, weakness and numbness. Negative for seizures, syncope and light-headedness. Hematological: Negative for adenopathy. Bruises/bleeds easily. Psychiatric/Behavioral: Negative for agitation, behavioral problems, confusion, decreased concentration, hallucinations and sleep disturbance. The patient is not nervous/anxious.       Past Medical History:   Diagnosis Date    Hyperlipidemia     Hypertension     Osteoarthritis     S/P inguinal herniorrhaphy     left     Past Surgical History:   Procedure Laterality Date    HERNIA REPAIR Bilateral     [place] COLON CA SCRN NOT HI RSK IND N/A 10/6/2017    COLONOSCOPY performed by Harvey Amos MD at [address] [place] [address]       Social History     Socioeconomic History    Marital status:      Spouse name: Not on file    Number of children: Not on file    Years of education: Not on file    Highest education level: Not on file   Occupational History    Not on file   Social Needs    Financial resource strain: Not very hard    Food insecurity     Worry: Not on file     Inability: Not on file    Transportation needs     Medical: Not on file     Non-medical: Not on file   Tobacco Use    Smoking status: Former Smoker     Packs/day: 1.00     Years: 20.00     Pack years: 20.00     Types: Cigarettes     Start date: 0     Quit date: 2020     Years since quittin.2    Smokeless tobacco: Never Used   Substance and Sexual Activity    Alcohol use:  Yes     Alcohol/week: 18.0 standard drinks     Types: 18 Cans of beer per week    Drug use: Yes     Types: Marijuana     Comment: 2X/ month    Sexual activity: Not on file     Comment:    Lifestyle    Physical activity     Days per week: Not on file     Minutes per session: Not on file    Stress: Not on file   Relationships    Social connections     Talks on phone: Not on file     Gets together: Not on file     Attends Evangelical service: Not on file     Active member of club or organization: Not on file     Attends meetings of clubs or organizations: Not on file     Relationship status: Not on file    Intimate partner violence     Fear of current or ex partner: Not on file     Emotionally abused: Not on file     Physically abused: Not on file     Forced sexual activity: Not on file   Other Topics Concern    Not on file   Social History Narrative    Not on file     No family history on file. No Known Allergies  Current Outpatient Medications   Medication Sig Dispense Refill    metoprolol tartrate (LOPRESSOR) 25 MG tablet GIVE 1 TABLET VIA PEG TUBE IN THE MORNING AND AT BEDTIME      ofloxacin (OCUFLOX) 0.3 % solution       oxyCODONE (ROXICODONE) 5 MG/5ML solution       aspirin 81 MG chewable tablet Take 81 mg by mouth daily      melatonin 1 MG tablet Take 10 mg by mouth nightly as needed      gabapentin (NEURONTIN) 100 MG capsule Take 1 capsule by mouth 3 times daily for 180 days. Intended supply: 90 days 90 capsule 2    Handicap Placard MISC by Does not apply route Good for 5 years. Expires 3/10/2026. 1 each 0    metoprolol-hydrochlorothiazide (LOPRESSOR HCT) 100-25 MG per tablet take 1 tablet by mouth once daily 30 tablet 11    cetirizine (ZYRTEC) 10 MG tablet Take 1 tablet by mouth daily 30 tablet 11    atorvastatin (LIPITOR) 10 MG tablet take 1 tablet by mouth once daily 30 tablet 11    amLODIPine (NORVASC) 10 MG tablet Take 1 tablet by mouth daily 30 tablet 11     No current facility-administered medications for this visit. PMH, Surgical Hx, Family Hx, and Social Hx reviewed and updated. Health Maintenance reviewed. Objective  Vitals:    03/09/21 0956   BP: 122/78   Pulse: 102   Resp: 18   Temp: 97 °F (36.1 °C)   TempSrc: Temporal   SpO2: 96%     BP Readings from Last 3 Encounters:   03/09/21 122/78   02/13/21 (!) 120/90   10/30/20 (!) 163/83     Wt Readings from Last 3 Encounters:   02/13/21 130 lb (59 kg)   10/30/20 165 lb (74.8 kg)   02/27/20 159 lb (72.1 kg)     Physical Exam  Vitals signs reviewed. Constitutional:       General: He is not in acute distress. Appearance: He is not ill-appearing, toxic-appearing or diaphoretic. HENT:      Head: Normocephalic.       Right Ear: External ear normal.      Left Ear: External ear normal.      Nose: Nose normal.   Eyes:      Conjunctiva/sclera: Conjunctivae normal.   Cardiovascular: Rate and Rhythm: Normal rate and regular rhythm. Abdominal:      General: Bowel sounds are normal. There is no distension. Palpations: There is no mass. Tenderness: There is no abdominal tenderness. Musculoskeletal:      Comments: Hemiparesis of LUE and LLE with spasticity of muscles noted on exam.     Skin:     General: Skin is warm. Neurological:      Mental Status: He is alert. Sensory: Sensory deficit present. Motor: Weakness present. Coordination: Coordination abnormal.      Gait: Gait abnormal.       Assessment & Plan   Edwige Dennis was seen today for new patient. Diagnoses and all orders for this visit:    Cerebrovascular accident (CVA) due to embolism of precerebral artery ([address]  -     Mik García MD, Neurology, [place]  -     gabapentin (NEURONTIN) 100 MG capsule; Take 1 capsule by mouth 3 times daily for 180 days. Intended supply: 90 days  -     Amb External Referral To [facility]; by Does not apply route Good for 5 years. Expires 3/10/2026. Paralysis [facility])  -     Maria A García MD, Neurology, [place]  -     gabapentin (NEURONTIN) 100 MG capsule; Take 1 capsule by mouth 3 times daily for 180 days. Intended supply: 90 days  -     Amb External Referral To [facility]; by Does not apply route Good for 5 years. Expires 3/10/2026. 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 Referral to neurology, Dr. Liz Antunez. Controlled BP in the office today. Discussed [address] to help with patient's care as sister is helping with all of patient's ADLs. Continues with home PT/OT/Speech. Tube feedings only nocturnal at this time.   Monitor WILLY

## 2021-03-10 PROBLEM — G83.9 PARALYSIS (HCC): Status: ACTIVE | Noted: 2021-03-10

## 2021-03-10 PROBLEM — Z93.1 PEG (PERCUTANEOUS ENDOSCOPIC GASTROSTOMY) STATUS (HCC): Status: ACTIVE | Noted: 2021-03-10

## 2021-03-10 PROBLEM — I10 ESSENTIAL HYPERTENSION: Status: ACTIVE | Noted: 2021-02-02

## 2021-03-10 PROBLEM — I69.391 DYSPHAGIA DUE TO RECENT CEREBRAL INFARCTION: Status: ACTIVE | Noted: 2021-02-02

## 2021-03-10 PROBLEM — Z86.73 HISTORY OF STROKE: Status: ACTIVE | Noted: 2021-02-02

## 2021-03-10 PROBLEM — F06.30: Status: ACTIVE | Noted: 2021-02-02

## 2021-03-10 PROBLEM — I63.10 CEREBROVASCULAR ACCIDENT (CVA) DUE TO EMBOLISM OF PRECEREBRAL ARTERY (HCC): Status: ACTIVE | Noted: 2021-03-10

## 2021-03-10 PROBLEM — Z98.890 STATUS POST CRANIECTOMY: Status: ACTIVE | Noted: 2021-02-02

## 2021-03-10 PROBLEM — I63.9: Status: ACTIVE | Noted: 2021-02-02

## 2021-03-10 PROBLEM — G81.94 LEFT HEMIPARESIS (HCC): Status: ACTIVE | Noted: 2021-02-02

## 2021-03-10 PROBLEM — F19.11 HISTORY OF SUBSTANCE ABUSE (HCC): Status: ACTIVE | Noted: 2021-02-02

## 2021-03-10 ASSESSMENT — ENCOUNTER SYMPTOMS
CHEST TIGHTNESS: 0
ABDOMINAL PAIN: 0
SHORTNESS OF BREATH: 0
VOMITING: 0
CONSTIPATION: 0
ABDOMINAL DISTENTION: 0
DIARRHEA: 0
FACIAL SWELLING: 0
COUGH: 0
WHEEZING: 0
COLOR CHANGE: 0
VOICE CHANGE: 1
TROUBLE SWALLOWING: 1

## 2021-03-11 ENCOUNTER — TELEPHONE (OUTPATIENT)
Dept: FAMILY MEDICINE CLINIC | Age: 61
End: 2021-03-11

## 2021-03-11 NOTE — TELEPHONE ENCOUNTER
Elijah Lockwood calls asking for verbal approval for OT for 8 total visits 2x a week for 4 weeks. I have noted that this patient's PCP is [facility], I'm not sure where you come in, but Elijah Lockwood asks me to get this to you.

## 2021-03-12 ENCOUNTER — TELEPHONE (OUTPATIENT)
Dept: FAMILY MEDICINE CLINIC | Age: 61
End: 2021-03-12

## 2021-03-16 ENCOUNTER — TELEPHONE (OUTPATIENT)
Dept: FAMILY MEDICINE CLINIC | Age: 61
End: 2021-03-16

## 2021-03-16 DIAGNOSIS — I10 ESSENTIAL HYPERTENSION: ICD-10-CM

## 2021-03-16 RX ORDER — AMLODIPINE BESYLATE 10 MG/1
TABLET ORAL
Qty: 90 TABLET | Refills: 4 | Status: SHIPPED | OUTPATIENT
Start: 2021-03-16

## 2021-03-16 NOTE — TELEPHONE ENCOUNTER
Yes, it would be a general surgery consult. I they would like, I will place referral.  It will be with Dr. Mendoza Robles.

## 2021-03-16 NOTE — TELEPHONE ENCOUNTER
Jos Chang called in regarding patient. She wanted to let you know that patient has not been using the peg line to eat or take meds since last Thursday. She said patient is doing well without it and has been taking in 2-3000 calories a day. She feels the peg tube is doing more harm than good because patient got burnt on his stomach from it dripping. She also said patient has been drinking out of a straw and moving occasionally. She wants to know is there something they can do to get the peg removed for patient.     Thanks :)

## 2021-03-16 NOTE — TELEPHONE ENCOUNTER
Rx request   Requested Prescriptions     Pending Prescriptions Disp Refills    metoprolol tartrate (LOPRESSOR) 25 MG tablet 60 tablet     amLODIPine (NORVASC) 10 MG tablet 30 tablet 11     Sig: Take 1 tablet by mouth daily     LOV 3/9/2021  Next Visit Date:  Future Appointments   Date Time Provider Elidia Armas   4/8/2021 10:15 AM Frantz Ni PA-C [address]

## 2021-03-18 ENCOUNTER — TELEPHONE (OUTPATIENT)
Dept: FAMILY MEDICINE CLINIC | Age: 61
End: 2021-03-18

## 2021-03-18 NOTE — TELEPHONE ENCOUNTER
Signed paperwork from [place] [address] is asking for a detail standard written order and office notes signed confirmed and scanned in.

## 2021-03-19 ENCOUNTER — TELEPHONE (OUTPATIENT)
Dept: FAMILY MEDICINE CLINIC | Age: 61
End: 2021-03-19

## 2021-03-19 NOTE — TELEPHONE ENCOUNTER
Patients sister called in and said noone has called her to [facility]. I called [facility] and the lady I spoke with said the referral needs to have the reasons for wanting [facility] ex(speech,occupational) she said the way the provider is sending it on epic they are not receiving the referral.    Home health also said they would need face sheet and progress notes within the last 90 days with diagnosis of needing HHC.     Please advise and thank you

## 2021-03-23 ENCOUNTER — TELEPHONE (OUTPATIENT)
Dept: FAMILY MEDICINE CLINIC | Age: 61
End: 2021-03-23

## 2021-03-23 NOTE — TELEPHONE ENCOUNTER
Ursula from [place] is calling to check on the status of pts disability claim physician statement. States she sent it on 3/9.

## 2021-03-24 ENCOUNTER — TELEPHONE (OUTPATIENT)
Dept: FAMILY MEDICINE CLINIC | Age: 61
End: 2021-03-24

## 2021-03-24 NOTE — TELEPHONE ENCOUNTER
Scott Tate, pts brother called to check on the status of the disability claim form as well. I informed him that this document will be completed this week.

## 2021-03-24 NOTE — TELEPHONE ENCOUNTER
Rosibel Aguilera from [facility] is calling in regards to pts wheelchair. 3/9 office visit does not mention that pt needs a wheelchair. Rosibel Aguilera is requesting that doctor addend the document stating that the pt needs the wheelchair and why.      Faxed to Rosibel Aguilera:  [phone]  [employer] PH# [phone]

## 2021-03-24 NOTE — TELEPHONE ENCOUNTER
Fabbyaurelio Montoya, sister; is calling to let you know about a rash that Mr. Saul Humphries has in his buttocks. His girlfriend thinks that it is Shingles. Would like to know what to do. In the meantime she is going to call the [facility] person to get some advise. Please advise and thanks.

## 2021-03-25 ENCOUNTER — OFFICE VISIT (OUTPATIENT)
Dept: SURGERY | Age: 61
End: 2021-03-25
Payer: COMMERCIAL

## 2021-03-25 VITALS
WEIGHT: 150 LBS | BODY MASS INDEX: 23.54 KG/M2 | SYSTOLIC BLOOD PRESSURE: 120 MMHG | TEMPERATURE: 97.6 F | DIASTOLIC BLOOD PRESSURE: 70 MMHG | HEIGHT: 67 IN

## 2021-03-25 DIAGNOSIS — K94.23 GASTROSTOMY MALFUNCTION (HCC): Primary | ICD-10-CM

## 2021-03-25 PROCEDURE — G8482 FLU IMMUNIZE ORDER/ADMIN: HCPCS | Performed by: SURGERY

## 2021-03-25 PROCEDURE — 3017F COLORECTAL CA SCREEN DOC REV: CPT | Performed by: SURGERY

## 2021-03-25 PROCEDURE — G8427 DOCREV CUR MEDS BY ELIG CLIN: HCPCS | Performed by: SURGERY

## 2021-03-25 PROCEDURE — G8420 CALC BMI NORM PARAMETERS: HCPCS | Performed by: SURGERY

## 2021-03-25 PROCEDURE — 1036F TOBACCO NON-USER: CPT | Performed by: SURGERY

## 2021-03-25 PROCEDURE — 99203 OFFICE O/P NEW LOW 30 MIN: CPT | Performed by: SURGERY

## 2021-03-25 ASSESSMENT — ENCOUNTER SYMPTOMS
EYES NEGATIVE: 1
COLOR CHANGE: 0
CHEST TIGHTNESS: 0
SHORTNESS OF BREATH: 0
ALLERGIC/IMMUNOLOGIC NEGATIVE: 1
RHINORRHEA: 0
ABDOMINAL PAIN: 0
BLOOD IN STOOL: 0
CONSTIPATION: 0
ABDOMINAL DISTENTION: 0

## 2021-03-25 NOTE — PROGRESS NOTES
Everardo Partida (:  1960) is a 64 y.o. male,New patient, here for evaluation of the following chief complaint(s):  New Patient (discuss removing Gtube removal)      ASSESSMENT/PLAN:  Malfunction of gastrostomy tube  No longer using gastrostomy tube    Gastrostomy tube removed  The options of therapy were discussed with the patient and He requests removal. The procedure as wall ask risks and complications including but not exclusive to blood loss, pain and a persistent fistula even requiring further surgery were discussed and He is agreeable to the procedure. The tube is in the epigastric/left upper quadrant. With the patient in the supine position the gastrostomy tube was removed with gentle traction. The tube was removed intact. There is No bleeding. Post procedure pain was rated 0. A dry sterile dressing is applied. Patient is to watch for rectal bleeding, bloody emesis, increasing abdominal pain and persistent drainage. He is instructed to call immediately or go to the emergency room if these occur. SUBJECTIVE/OBJECTIVE:  HPI   Everardo Partida is here at the request of Tim Ward for possible gastrostomy tube removal. He had a gastrostomy tube placed 3 months ago for the diagnosis of dysphagia. He had the tube placed on 2020 at [facility].  Records were reviewed. The patient states He is eating well and no longer uses the tube. He has not used the tube for 1 month. He is not on anticoagulants. Apparently the tube has been leaking and they have to place a glove over the end of the tube to catch gastric juices leaking out. There is found no leakage onto the skin. Review of Systems   Constitutional: Negative for activity change, appetite change and unexpected weight change. HENT: Negative for congestion, nosebleeds, postnasal drip, rhinorrhea and sneezing. Eyes: Negative. Negative for visual disturbance. Respiratory: Negative for chest tightness and shortness of breath. Cardiovascular: Negative for chest pain and leg swelling. Gastrointestinal: Negative for abdominal distention, abdominal pain, blood in stool and constipation. Endocrine: Negative. Genitourinary: Negative for difficulty urinating. Musculoskeletal: Positive for gait problem. Negative for arthralgias and myalgias. Skin: Negative for color change. Allergic/Immunologic: Negative. Neurological: Positive for speech difficulty and weakness. Negative for dizziness, light-headedness, numbness and headaches. Hematological: Does not bruise/bleed easily. Psychiatric/Behavioral: Negative for sleep disturbance. Physical Exam  Constitutional:       General: He is not in acute distress. Appearance: Normal appearance. HENT:      Mouth/Throat:      Mouth: Mucous membranes are moist.      Pharynx: Oropharynx is clear. Eyes:      Pupils: Pupils are equal, round, and reactive to light. Neck:      Comments: Neck is supple without any masses, no thyromegaly, trachea midline  Abdominal:       Musculoskeletal:      Comments: Left hemiparesis  In a wheelchair  Nonverbal   Skin:     Findings: No bruising, lesion or rash. Neurological:      Mental Status: He is alert and oriented to person, place, and time. Psychiatric:         Mood and Affect: Mood normal.         Judgment: Judgment normal.       /70   Temp 97.6 °F (36.4 °C) (Temporal)   Ht 5' 7\" (1.702 m)   Wt 150 lb (68 kg)   BMI 23.49 kg/m²           An electronic signature was used to authenticate this note.     --Bhavna Martinez MD

## 2021-03-25 NOTE — TELEPHONE ENCOUNTER
Not sure what kind of brace they are referring to. We discussed the helmet for fall precaution.    Is this something she has discussed with PT?

## 2021-03-25 NOTE — PATIENT INSTRUCTIONS
No eating or drinking for 3 hours  Change dressing at tube exit site daily until no longer drainage  Return to see me as needed

## 2021-03-25 NOTE — TELEPHONE ENCOUNTER
Spoke to Ming and physical therapy was discussing a left leg brace to keep the patients leg straight so he can do physical therapy and also be refitted for a helmet that fits     Please advise

## 2021-03-26 NOTE — TELEPHONE ENCOUNTER
OK< I answered in re: who is helping him with finances right now as he is not paying his own bills, etc.

## 2021-03-26 NOTE — TELEPHONE ENCOUNTER
Money caling back today in regards to [employer] paperwork Ursula from [employer], [employer] states that they need a reason on why the patients needs a financial POA (this was filled out on the paperwork) Money sates the patient is fully aware and can make is own financial decisions.      Please advise

## 2021-04-02 ENCOUNTER — TELEPHONE (OUTPATIENT)
Dept: FAMILY MEDICINE CLINIC | Age: 61
End: 2021-04-02

## 2021-04-02 NOTE — TELEPHONE ENCOUNTER
Mykel from [place] is calling to speak to you or someone that can help her with this patient's disability. She states that they received the paperwork signed by you but when they called the patient's brother, he told them that the patient is able to take care of his financial and personal affairs. They need POA papers or a physician statement stating that the patient is incapacitated to take care of these affairs. Please advise and thank you!

## 2021-04-05 ENCOUNTER — HOSPITAL ENCOUNTER (INPATIENT)
Age: 61
LOS: 5 days | Discharge: HOME HEALTH | DRG: 872 | End: 2021-04-10
Attending: EMERGENCY MEDICINE | Admitting: INTERNAL MEDICINE
Payer: COMMERCIAL

## 2021-04-05 ENCOUNTER — APPOINTMENT (OUTPATIENT)
Dept: CT IMAGING | Age: 61
DRG: 872 | End: 2021-04-05
Payer: COMMERCIAL

## 2021-04-05 ENCOUNTER — APPOINTMENT (OUTPATIENT)
Dept: GENERAL RADIOLOGY | Age: 61
DRG: 872 | End: 2021-04-05
Payer: COMMERCIAL

## 2021-04-05 DIAGNOSIS — N30.01 ACUTE CYSTITIS WITH HEMATURIA: Primary | ICD-10-CM

## 2021-04-05 LAB
ALBUMIN SERPL-MCNC: 3.7 G/DL (ref 3.5–4.6)
ALP BLD-CCNC: 91 U/L (ref 35–104)
ALT SERPL-CCNC: 7 U/L (ref 0–41)
ANION GAP SERPL CALCULATED.3IONS-SCNC: 8 MEQ/L (ref 9–15)
AST SERPL-CCNC: 9 U/L (ref 0–40)
BILIRUB SERPL-MCNC: 0.4 MG/DL (ref 0.2–0.7)
BUN BLDV-MCNC: 11 MG/DL (ref 8–23)
CALCIUM SERPL-MCNC: 9.3 MG/DL (ref 8.5–9.9)
CHLORIDE BLD-SCNC: 98 MEQ/L (ref 95–107)
CO2: 25 MEQ/L (ref 20–31)
CREAT SERPL-MCNC: 0.88 MG/DL (ref 0.7–1.2)
GFR AFRICAN AMERICAN: >60
GFR NON-AFRICAN AMERICAN: >60
GLOBULIN: 4.1 G/DL (ref 2.3–3.5)
GLUCOSE BLD-MCNC: 96 MG/DL (ref 70–99)
HCT VFR BLD CALC: 30.2 % (ref 42–52)
HEMOGLOBIN: 9.2 G/DL (ref 14–18)
LACTIC ACID: 1.1 MMOL/L (ref 0.5–2.2)
MCH RBC QN AUTO: 21.8 PG (ref 27–31.3)
MCHC RBC AUTO-ENTMCNC: 30.6 % (ref 33–37)
MCV RBC AUTO: 71.4 FL (ref 80–100)
PDW BLD-RTO: 17 % (ref 11.5–14.5)
PLATELET # BLD: 501 K/UL (ref 130–400)
POTASSIUM SERPL-SCNC: 3.6 MEQ/L (ref 3.4–4.9)
RBC # BLD: 4.23 M/UL (ref 4.7–6.1)
SARS-COV-2, NAAT: NOT DETECTED
SODIUM BLD-SCNC: 131 MEQ/L (ref 135–144)
TOTAL PROTEIN: 7.8 G/DL (ref 6.3–8)
WBC # BLD: 20.6 K/UL (ref 4.8–10.8)

## 2021-04-05 PROCEDURE — 87040 BLOOD CULTURE FOR BACTERIA: CPT

## 2021-04-05 PROCEDURE — 99285 EMERGENCY DEPT VISIT HI MDM: CPT

## 2021-04-05 PROCEDURE — 87635 SARS-COV-2 COVID-19 AMP PRB: CPT

## 2021-04-05 PROCEDURE — 80053 COMPREHEN METABOLIC PANEL: CPT

## 2021-04-05 PROCEDURE — 81001 URINALYSIS AUTO W/SCOPE: CPT

## 2021-04-05 PROCEDURE — 71045 X-RAY EXAM CHEST 1 VIEW: CPT

## 2021-04-05 PROCEDURE — 96375 TX/PRO/DX INJ NEW DRUG ADDON: CPT

## 2021-04-05 PROCEDURE — 85027 COMPLETE CBC AUTOMATED: CPT

## 2021-04-05 PROCEDURE — 74176 CT ABD & PELVIS W/O CONTRAST: CPT

## 2021-04-05 PROCEDURE — 2580000003 HC RX 258: Performed by: EMERGENCY MEDICINE

## 2021-04-05 PROCEDURE — 1210000000 HC MED SURG R&B

## 2021-04-05 PROCEDURE — 87086 URINE CULTURE/COLONY COUNT: CPT

## 2021-04-05 PROCEDURE — 83605 ASSAY OF LACTIC ACID: CPT

## 2021-04-05 PROCEDURE — 6360000002 HC RX W HCPCS: Performed by: EMERGENCY MEDICINE

## 2021-04-05 PROCEDURE — 36415 COLL VENOUS BLD VENIPUNCTURE: CPT

## 2021-04-05 PROCEDURE — 6370000000 HC RX 637 (ALT 250 FOR IP): Performed by: EMERGENCY MEDICINE

## 2021-04-05 PROCEDURE — 96365 THER/PROPH/DIAG IV INF INIT: CPT

## 2021-04-05 RX ORDER — POLYETHYLENE GLYCOL 3350 17 G/17G
17 POWDER, FOR SOLUTION ORAL DAILY PRN
Status: DISCONTINUED | OUTPATIENT
Start: 2021-04-05 | End: 2021-04-10 | Stop reason: HOSPADM

## 2021-04-05 RX ORDER — SODIUM CHLORIDE 0.9 % (FLUSH) 0.9 %
10 SYRINGE (ML) INJECTION EVERY 12 HOURS SCHEDULED
Status: DISCONTINUED | OUTPATIENT
Start: 2021-04-05 | End: 2021-04-10 | Stop reason: HOSPADM

## 2021-04-05 RX ORDER — ACETAMINOPHEN 500 MG
1000 TABLET ORAL ONCE
Status: COMPLETED | OUTPATIENT
Start: 2021-04-05 | End: 2021-04-05

## 2021-04-05 RX ORDER — 0.9 % SODIUM CHLORIDE 0.9 %
1000 INTRAVENOUS SOLUTION INTRAVENOUS ONCE
Status: DISCONTINUED | OUTPATIENT
Start: 2021-04-05 | End: 2021-04-10 | Stop reason: HOSPADM

## 2021-04-05 RX ORDER — 0.9 % SODIUM CHLORIDE 0.9 %
1000 INTRAVENOUS SOLUTION INTRAVENOUS ONCE
Status: COMPLETED | OUTPATIENT
Start: 2021-04-05 | End: 2021-04-05

## 2021-04-05 RX ORDER — KETOROLAC TROMETHAMINE 30 MG/ML
30 INJECTION, SOLUTION INTRAMUSCULAR; INTRAVENOUS ONCE
Status: COMPLETED | OUTPATIENT
Start: 2021-04-05 | End: 2021-04-05

## 2021-04-05 RX ORDER — SODIUM CHLORIDE 0.9 % (FLUSH) 0.9 %
10 SYRINGE (ML) INJECTION PRN
Status: DISCONTINUED | OUTPATIENT
Start: 2021-04-05 | End: 2021-04-10 | Stop reason: HOSPADM

## 2021-04-05 RX ORDER — ACETAMINOPHEN 650 MG/1
650 SUPPOSITORY RECTAL EVERY 6 HOURS PRN
Status: DISCONTINUED | OUTPATIENT
Start: 2021-04-05 | End: 2021-04-10 | Stop reason: HOSPADM

## 2021-04-05 RX ORDER — PROMETHAZINE HYDROCHLORIDE 12.5 MG/1
12.5 TABLET ORAL EVERY 6 HOURS PRN
Status: DISCONTINUED | OUTPATIENT
Start: 2021-04-05 | End: 2021-04-10 | Stop reason: HOSPADM

## 2021-04-05 RX ORDER — ACETAMINOPHEN 325 MG/1
650 TABLET ORAL EVERY 6 HOURS PRN
Status: DISCONTINUED | OUTPATIENT
Start: 2021-04-05 | End: 2021-04-10 | Stop reason: HOSPADM

## 2021-04-05 RX ORDER — SODIUM CHLORIDE 9 MG/ML
25 INJECTION, SOLUTION INTRAVENOUS PRN
Status: DISCONTINUED | OUTPATIENT
Start: 2021-04-05 | End: 2021-04-10 | Stop reason: HOSPADM

## 2021-04-05 RX ORDER — SODIUM CHLORIDE 9 MG/ML
INJECTION, SOLUTION INTRAVENOUS CONTINUOUS
Status: DISCONTINUED | OUTPATIENT
Start: 2021-04-05 | End: 2021-04-06

## 2021-04-05 RX ORDER — ONDANSETRON 2 MG/ML
4 INJECTION INTRAMUSCULAR; INTRAVENOUS EVERY 6 HOURS PRN
Status: DISCONTINUED | OUTPATIENT
Start: 2021-04-05 | End: 2021-04-10 | Stop reason: HOSPADM

## 2021-04-05 RX ADMIN — SODIUM CHLORIDE 1000 ML: 9 INJECTION, SOLUTION INTRAVENOUS at 17:15

## 2021-04-05 RX ADMIN — ACETAMINOPHEN 1000 MG: 500 TABLET ORAL at 17:14

## 2021-04-05 RX ADMIN — CEFTRIAXONE SODIUM 1000 MG: 1 INJECTION, POWDER, FOR SOLUTION INTRAMUSCULAR; INTRAVENOUS at 19:57

## 2021-04-05 RX ADMIN — KETOROLAC TROMETHAMINE 30 MG: 30 INJECTION, SOLUTION INTRAMUSCULAR; INTRAVENOUS at 17:15

## 2021-04-05 RX ADMIN — SODIUM CHLORIDE: 9 INJECTION, SOLUTION INTRAVENOUS at 19:54

## 2021-04-05 ASSESSMENT — PAIN DESCRIPTION - PAIN TYPE: TYPE: ACUTE PAIN

## 2021-04-05 ASSESSMENT — ENCOUNTER SYMPTOMS
ABDOMINAL PAIN: 0
RHINORRHEA: 0
SHORTNESS OF BREATH: 0
EYE DISCHARGE: 0
VOMITING: 0
FACIAL SWELLING: 0
COLOR CHANGE: 0

## 2021-04-05 ASSESSMENT — PAIN DESCRIPTION - DESCRIPTORS: DESCRIPTORS: ACHING

## 2021-04-05 ASSESSMENT — PAIN SCALES - GENERAL
PAINLEVEL_OUTOF10: 7
PAINLEVEL_OUTOF10: 8
PAINLEVEL_OUTOF10: 4

## 2021-04-05 ASSESSMENT — PAIN DESCRIPTION - PROGRESSION: CLINICAL_PROGRESSION: NOT CHANGED

## 2021-04-05 ASSESSMENT — PAIN DESCRIPTION - ORIENTATION
ORIENTATION: LEFT

## 2021-04-05 ASSESSMENT — PAIN DESCRIPTION - FREQUENCY
FREQUENCY: OTHER (COMMENT)
FREQUENCY: CONTINUOUS

## 2021-04-05 ASSESSMENT — PAIN DESCRIPTION - LOCATION
LOCATION: HIP
LOCATION: HIP

## 2021-04-05 NOTE — ED NOTES
Pt alert and able to tell this nurse his sisters name and that he lived with her in her apartment.  Sister at bedside     [facility]  04/05/21 0947

## 2021-04-05 NOTE — ED NOTES
Pt brother at bedside and stating that the pt has has \"bad smelling urine\" for the last week. Possible painful urination as well.       Antonio Schmitt RN  04/05/21 [id number]

## 2021-04-05 NOTE — ED TRIAGE NOTES
Pt had peg tube removed 10 days ago and has had L side flank pain and Left side ABD pain. Pt has history of stroke in December. Pt states that he feels can not go to the restroom. Pt states that he is able to eat. Denies Nausea, vomiting, chest pain. Pt denies any radiating pain at this time. Pt states \"the pain is where that tube was\".

## 2021-04-05 NOTE — ED NOTES
Bed: 04  Expected date:   Expected time:   Means of arrival:   Comments:     Gretchen Pantoja RN  04/05/21 [id number]

## 2021-04-05 NOTE — ED NOTES
Pt returned from CT at this time. PT still unable to urinate at this time.       Nicki Zamora RN  04/05/21 7216

## 2021-04-05 NOTE — ED PROVIDER NOTES
1 [facility] [address]  eMERGENCY dEPARTMENT eNCOUnter      Pt Name: Eriberto Garvin  MRN: [id number]  Andiegfangelita 1960  Date of evaluation: 4/5/2021  Provider: Luly Garsia, [address] [place] [address]       Chief Complaint   Patient presents with    Flank Pain         HISTORY OF PRESENT ILLNESS   (Location/Symptom, Timing/Onset,Context/Setting, Quality, Duration, Modifying Factors, Severity)  Note limiting factors. Eriberto Garvin is a 64 y.o. male who presents to the emergency department with a chief complaint of feeling fatigued and having some left flank pain. Patient states he got his PEG tube removed about 7 to 10 days ago and is tender in that area. He is also had foul-smelling urine lately and some painful urination. He has been staying with a friend who is helping him recover from a stroke and he currently is wearing a helmet with a missing skull flap. He has been improving overall. He had a temp of 102 per EMS    HPI    NursingNotes were reviewed. REVIEW OF SYSTEMS    (2-9 systems for level 4, 10 or more for level 5)     Review of Systems   Constitutional: Positive for activity change, appetite change and fatigue. HENT: Negative for congestion, facial swelling and rhinorrhea. Eyes: Negative for discharge. Respiratory: Negative for shortness of breath. Cardiovascular: Negative for chest pain. Gastrointestinal: Negative for abdominal pain and vomiting. Endocrine: Negative for cold intolerance. Genitourinary: Positive for dysuria and flank pain. Musculoskeletal: Negative for arthralgias and myalgias. Skin: Negative for color change. Allergic/Immunologic: Negative for environmental allergies. Neurological: Negative for dizziness and light-headedness. Hematological: Negative for adenopathy. Psychiatric/Behavioral: Negative for behavioral problems. Except as noted above the remainder of the review of systems was reviewed and negative.        PAST MEDICAL HISTORY     Past Medical History:   Diagnosis Date    Hyperlipidemia     Hypertension     Osteoarthritis     S/P inguinal herniorrhaphy     left         SURGICALHISTORY       Past Surgical History:   Procedure Laterality Date    HERNIA REPAIR Bilateral     [place] COLON CA SCRN NOT HI RSK IND N/A 10/6/2017    COLONOSCOPY performed by Antelmo Del Rio MD at [address] [place] [address]       Discharge Medication List as of 4/10/2021  6:16 PM      CONTINUE these medications which have NOT CHANGED    Details   metoprolol tartrate (LOPRESSOR) 25 MG tablet GIVE 1 TABLET VIA PEG TUBE IN THE MORNING AND AT BEDTIME, Disp-180 tablet, R-4Normal      amLODIPine (NORVASC) 10 MG tablet 1 tablet via PEG daily. , Disp-90 tablet, R-4Normal      ofloxacin (OCUFLOX) 0.3 % solution Historical Med      oxyCODONE (ROXICODONE) 5 MG/5ML solution Historical Med      aspirin 81 MG chewable tablet Take 81 mg by mouth dailyHistorical Med      melatonin 1 MG tablet Take 10 mg by mouth nightly as neededHistorical Med      gabapentin (NEURONTIN) 100 MG capsule Take 1 capsule by mouth 3 times daily for 180 days. Intended supply: 90 days, Disp-90 capsule, R-2Normal      Handicap Placard MISC Starting Tue 3/9/2021, Disp-1 each, R-0, PrintGood for 5 years. Expires 3/10/2026. cetirizine (ZYRTEC) 10 MG tablet Take 1 tablet by mouth daily, Disp-30 tablet, R-11Normal      atorvastatin (LIPITOR) 10 MG tablet take 1 tablet by mouth once daily, Disp-30 tablet, R-11Normal             ALLERGIES     Patient has no known allergies. FAMILY HISTORY     No family history on file.        SOCIAL HISTORY       Social History     Socioeconomic History    Marital status:      Spouse name: Not on file    Number of children: Not on file    Years of education: Not on file    Highest education level: Not on file   Occupational History    Not on file   Social Needs    Financial resource strain: Not very hard IDENTIFIED. XR CHEST PORTABLE   Final Result   NO ACUTE CARDIOPULMONARY DISEASE.             ED BEDSIDE ULTRASOUND:   Performed by ED Physician - none    LABS:  Labs Reviewed   CBC - Abnormal; Notable for the following components:       Result Value    WBC 20.6 (*)     RBC 4.23 (*)     Hemoglobin 9.2 (*)     Hematocrit 30.2 (*)     MCV 71.4 (*)     MCH 21.8 (*)     MCHC 30.6 (*)     RDW 17.0 (*)     Platelets 896 (*)     All other components within normal limits   COMPREHENSIVE METABOLIC PANEL - Abnormal; Notable for the following components:    Sodium 131 (*)     Anion Gap 8 (*)     Globulin 4.1 (*)     All other components within normal limits   URINALYSIS - Abnormal; Notable for the following components:    Clarity, UA CLOUDY (*)     Blood, Urine SMALL (*)     Protein, UA TRACE (*)     Leukocyte Esterase, Urine LARGE (*)     All other components within normal limits   CBC WITH AUTO DIFFERENTIAL - Abnormal; Notable for the following components:    WBC 16.1 (*)     RBC 3.94 (*)     Hemoglobin 8.7 (*)     Hematocrit 28.1 (*)     MCV 71.3 (*)     MCH 22.0 (*)     MCHC 30.8 (*)     RDW 16.9 (*)     Platelets 458 (*)     Neutrophils Absolute 14.1 (*)     Lymphocytes Absolute 0.7 (*)     Monocytes Absolute 1.1 (*)     All other components within normal limits   COMPREHENSIVE METABOLIC PANEL - Abnormal; Notable for the following components:    Albumin 3.3 (*)     Globulin 3.7 (*)     All other components within normal limits   MICROSCOPIC URINALYSIS - Abnormal; Notable for the following components:    WBC, UA >100 (*)     RBC, UA 6-10 (*)     All other components within normal limits   CBC WITH AUTO DIFFERENTIAL - Abnormal; Notable for the following components:    RBC 3.76 (*)     Hemoglobin 8.3 (*)     Hematocrit 26.4 (*)     MCV 70.2 (*)     MCH 21.9 (*)     MCHC 31.2 (*)     RDW 16.9 (*)     Lymphocytes Absolute 0.9 (*)     Monocytes Absolute 0.9 (*)     All other components within normal limits COMPREHENSIVE METABOLIC PANEL - Abnormal; Notable for the following components:    BUN 6 (*)     CREATININE 0.62 (*)     Albumin 3.2 (*)     All other components within normal limits   CBC WITH AUTO DIFFERENTIAL - Abnormal; Notable for the following components:    WBC 4.6 (*)     RBC 3.61 (*)     Hemoglobin 7.9 (*)     Hematocrit 25.1 (*)     MCV 69.6 (*)     MCH 21.9 (*)     MCHC 31.4 (*)     RDW 17.1 (*)     Lymphocytes Absolute 0.8 (*)     All other components within normal limits   BASIC METABOLIC PANEL W/ REFLEX TO MG FOR LOW K - Abnormal; Notable for the following components:    Potassium reflex Magnesium 3.0 (*)     BUN 5 (*)     CREATININE 0.64 (*)     All other components within normal limits    Narrative:     CALL  [facility]  [facility] tel. [phone],  K results called to and read back by Jah Valentino, 04/08/2021 08:05, by [facility]   CBC WITH AUTO DIFFERENTIAL - Abnormal; Notable for the following components:    RBC 3.92 (*)     Hemoglobin 8.5 (*)     Hematocrit 27.3 (*)     MCV 69.6 (*)     MCH 21.8 (*)     MCHC 31.3 (*)     RDW 17.0 (*)     Platelets 001 (*)     Monocytes Absolute 1.0 (*)     All other components within normal limits   BASIC METABOLIC PANEL W/ REFLEX TO MG FOR LOW K - Abnormal; Notable for the following components:    BUN 6 (*)     CREATININE 0.58 (*)     All other components within normal limits   CBC WITH AUTO DIFFERENTIAL - Abnormal; Notable for the following components:    WBC 4.6 (*)     RBC 3.80 (*)     Hemoglobin 8.3 (*)     Hematocrit 26.8 (*)     MCV 70.5 (*)     MCH 21.8 (*)     MCHC 31.0 (*)     RDW 17.1 (*)     Platelets 069 (*)     All other components within normal limits   BASIC METABOLIC PANEL W/ REFLEX TO MG FOR LOW K - Abnormal; Notable for the following components:    CREATININE 0.64 (*)     All other components within normal limits   CULTURE, BLOOD 1    Narrative:     ORDER#: [id number]                          ORDERED BY: YASMIN VELARDE  SOURCE: Blood COLLECTED:  04/05/21 17:37  ANTIBIOTICS AT [facility].:                      RECEIVED :  04/05/21 17:41   CULTURE, BLOOD 2    Narrative:     ORDER#: [id number]                          ORDERED BY: YASMIN VELARDE  SOURCE: Blood                              COLLECTED:  04/05/21 17:37  ANTIBIOTICS AT [place].:                      RECEIVED :  04/05/21 17:41   COVID-19, RAPID   CULTURE, URINE    Narrative:     ORDER#: [id number]                          ORDERED BY: Arturo Gann  SOURCE: Urine Clean Catch                  COLLECTED:  04/05/21 16:42  ANTIBIOTICS AT [facility].:                      RECEIVED :  04/06/21 10:59   CULTURE, URINE    Narrative:     ORDER#: [id number]                          ORDERED BY: Prabhu Ibarra  SOURCE: Urine Clean Catch  Urine           COLLECTED:  04/08/21 10:29  ANTIBIOTICS AT [facility].:                      RECEIVED :  04/08/21 10:29   LACTIC ACID, PLASMA   MAGNESIUM    Narrative:     Patricia Felix tel. [phone],  K results called to and read back by Sherri Rocha, 04/08/2021 08:05, by Jack Quiroga       All other labs were within normal range or not returned as of this dictation. EMERGENCY DEPARTMENT COURSE and DIFFERENTIAL DIAGNOSIS/MDM:   Vitals:    Vitals:    04/09/21 0129 04/09/21 0757 04/09/21 1930 04/10/21 0722   BP: (!) 159/86 (!) 144/74 126/78 130/82   Pulse: 81 74 90 73   Resp: 18 18 18 18   Temp: 98.4 °F (36.9 °C) 97.9 °F (36.6 °C) 98.6 °F (37 °C) 97.7 °F (36.5 °C)   TempSrc: Oral Oral Oral Oral   SpO2: 99% 100% 100% 99%   Weight:       Height:           At time of initial evaluation and physical exam it is concerning for left Amauri. CT scan is only consistent with cystitis. Patient is a 20,000 white count and urine sample is delayed due to incontinence initially. Patient is given IV fluid and Rocephin is ordered however ideally urine sample will be obtained before this is infused. Patient will be admitted.     MDM    CRITICAL CARE TIME   Total Critical Care time was 0 minutes,

## 2021-04-06 ENCOUNTER — TELEPHONE (OUTPATIENT)
Dept: FAMILY MEDICINE CLINIC | Age: 61
End: 2021-04-06

## 2021-04-06 LAB
ALBUMIN SERPL-MCNC: 3.3 G/DL (ref 3.5–4.6)
ALP BLD-CCNC: 82 U/L (ref 35–104)
ALT SERPL-CCNC: 6 U/L (ref 0–41)
ANION GAP SERPL CALCULATED.3IONS-SCNC: 10 MEQ/L (ref 9–15)
AST SERPL-CCNC: 8 U/L (ref 0–40)
BACTERIA: NEGATIVE /HPF
BASOPHILS ABSOLUTE: 0.1 K/UL (ref 0–0.2)
BASOPHILS RELATIVE PERCENT: 0.3 %
BILIRUB SERPL-MCNC: 0.3 MG/DL (ref 0.2–0.7)
BILIRUBIN URINE: NEGATIVE
BLOOD, URINE: ABNORMAL
BUN BLDV-MCNC: 11 MG/DL (ref 8–23)
CALCIUM SERPL-MCNC: 9.3 MG/DL (ref 8.5–9.9)
CHLORIDE BLD-SCNC: 104 MEQ/L (ref 95–107)
CLARITY: ABNORMAL
CO2: 22 MEQ/L (ref 20–31)
COLOR: YELLOW
CREAT SERPL-MCNC: 0.75 MG/DL (ref 0.7–1.2)
EOSINOPHILS ABSOLUTE: 0.1 K/UL (ref 0–0.7)
EOSINOPHILS RELATIVE PERCENT: 0.9 %
EPITHELIAL CELLS, UA: ABNORMAL /HPF (ref 0–5)
GFR AFRICAN AMERICAN: >60
GFR NON-AFRICAN AMERICAN: >60
GLOBULIN: 3.7 G/DL (ref 2.3–3.5)
GLUCOSE BLD-MCNC: 92 MG/DL (ref 70–99)
GLUCOSE URINE: NEGATIVE MG/DL
HCT VFR BLD CALC: 28.1 % (ref 42–52)
HEMOGLOBIN: 8.7 G/DL (ref 14–18)
HYALINE CASTS: ABNORMAL /HPF (ref 0–5)
KETONES, URINE: NEGATIVE MG/DL
LEUKOCYTE ESTERASE, URINE: ABNORMAL
LYMPHOCYTES ABSOLUTE: 0.7 K/UL (ref 1–4.8)
LYMPHOCYTES RELATIVE PERCENT: 4.5 %
MCH RBC QN AUTO: 22 PG (ref 27–31.3)
MCHC RBC AUTO-ENTMCNC: 30.8 % (ref 33–37)
MCV RBC AUTO: 71.3 FL (ref 80–100)
MONOCYTES ABSOLUTE: 1.1 K/UL (ref 0.2–0.8)
MONOCYTES RELATIVE PERCENT: 6.9 %
NEUTROPHILS ABSOLUTE: 14.1 K/UL (ref 1.4–6.5)
NEUTROPHILS RELATIVE PERCENT: 87.4 %
NITRITE, URINE: NEGATIVE
PDW BLD-RTO: 16.9 % (ref 11.5–14.5)
PH UA: 5.5 (ref 5–9)
PLATELET # BLD: 421 K/UL (ref 130–400)
POTASSIUM SERPL-SCNC: 3.6 MEQ/L (ref 3.4–4.9)
PROTEIN UA: ABNORMAL MG/DL
RBC # BLD: 3.94 M/UL (ref 4.7–6.1)
RBC UA: ABNORMAL /HPF (ref 0–5)
SODIUM BLD-SCNC: 136 MEQ/L (ref 135–144)
SPECIFIC GRAVITY UA: 1.01 (ref 1–1.03)
TOTAL PROTEIN: 7 G/DL (ref 6.3–8)
UROBILINOGEN, URINE: 1 E.U./DL
WBC # BLD: 16.1 K/UL (ref 4.8–10.8)
WBC UA: >100 /HPF (ref 0–5)

## 2021-04-06 PROCEDURE — 6370000000 HC RX 637 (ALT 250 FOR IP): Performed by: INTERNAL MEDICINE

## 2021-04-06 PROCEDURE — 2580000003 HC RX 258: Performed by: INTERNAL MEDICINE

## 2021-04-06 PROCEDURE — 36415 COLL VENOUS BLD VENIPUNCTURE: CPT

## 2021-04-06 PROCEDURE — 6360000002 HC RX W HCPCS: Performed by: INTERNAL MEDICINE

## 2021-04-06 PROCEDURE — 1210000000 HC MED SURG R&B

## 2021-04-06 PROCEDURE — 85025 COMPLETE CBC W/AUTO DIFF WBC: CPT

## 2021-04-06 PROCEDURE — 80053 COMPREHEN METABOLIC PANEL: CPT

## 2021-04-06 RX ORDER — GABAPENTIN 100 MG/1
100 CAPSULE ORAL 3 TIMES DAILY
Status: DISCONTINUED | OUTPATIENT
Start: 2021-04-06 | End: 2021-04-10 | Stop reason: HOSPADM

## 2021-04-06 RX ORDER — KETOROLAC TROMETHAMINE 15 MG/ML
15 INJECTION, SOLUTION INTRAMUSCULAR; INTRAVENOUS EVERY 6 HOURS PRN
Status: DISCONTINUED | OUTPATIENT
Start: 2021-04-06 | End: 2021-04-10 | Stop reason: HOSPADM

## 2021-04-06 RX ORDER — SODIUM CHLORIDE 9 MG/ML
INJECTION, SOLUTION INTRAVENOUS CONTINUOUS
Status: DISCONTINUED | OUTPATIENT
Start: 2021-04-06 | End: 2021-04-07

## 2021-04-06 RX ORDER — ASPIRIN 81 MG/1
81 TABLET, CHEWABLE ORAL DAILY
Status: DISCONTINUED | OUTPATIENT
Start: 2021-04-06 | End: 2021-04-10 | Stop reason: HOSPADM

## 2021-04-06 RX ORDER — ATORVASTATIN CALCIUM 10 MG/1
10 TABLET, FILM COATED ORAL DAILY
Status: DISCONTINUED | OUTPATIENT
Start: 2021-04-06 | End: 2021-04-10 | Stop reason: HOSPADM

## 2021-04-06 RX ORDER — AMLODIPINE BESYLATE 10 MG/1
10 TABLET ORAL DAILY
Status: DISCONTINUED | OUTPATIENT
Start: 2021-04-06 | End: 2021-04-10 | Stop reason: HOSPADM

## 2021-04-06 RX ADMIN — SODIUM CHLORIDE, PRESERVATIVE FREE 10 ML: 5 INJECTION INTRAVENOUS at 11:11

## 2021-04-06 RX ADMIN — GABAPENTIN 100 MG: 100 CAPSULE ORAL at 21:32

## 2021-04-06 RX ADMIN — KETOROLAC TROMETHAMINE 15 MG: 15 INJECTION, SOLUTION INTRAMUSCULAR; INTRAVENOUS at 16:26

## 2021-04-06 RX ADMIN — GABAPENTIN 100 MG: 100 CAPSULE ORAL at 11:10

## 2021-04-06 RX ADMIN — SODIUM CHLORIDE: 9 INJECTION, SOLUTION INTRAVENOUS at 22:55

## 2021-04-06 RX ADMIN — METOPROLOL TARTRATE 25 MG: 25 TABLET, FILM COATED ORAL at 21:32

## 2021-04-06 RX ADMIN — CEFTRIAXONE SODIUM 1000 MG: 1 INJECTION, POWDER, FOR SOLUTION INTRAMUSCULAR; INTRAVENOUS at 17:53

## 2021-04-06 RX ADMIN — KETOROLAC TROMETHAMINE 15 MG: 15 INJECTION, SOLUTION INTRAMUSCULAR; INTRAVENOUS at 06:14

## 2021-04-06 RX ADMIN — ASPIRIN 81 MG: 81 TABLET, CHEWABLE ORAL at 11:10

## 2021-04-06 RX ADMIN — AMLODIPINE BESYLATE 10 MG: 10 TABLET ORAL at 11:10

## 2021-04-06 RX ADMIN — ENOXAPARIN SODIUM 40 MG: 40 INJECTION SUBCUTANEOUS at 11:10

## 2021-04-06 RX ADMIN — ATORVASTATIN CALCIUM 10 MG: 10 TABLET, FILM COATED ORAL at 11:10

## 2021-04-06 RX ADMIN — GABAPENTIN 100 MG: 100 CAPSULE ORAL at 17:53

## 2021-04-06 RX ADMIN — SODIUM CHLORIDE, PRESERVATIVE FREE 10 ML: 5 INJECTION INTRAVENOUS at 21:32

## 2021-04-06 RX ADMIN — METOPROLOL TARTRATE 25 MG: 25 TABLET, FILM COATED ORAL at 11:10

## 2021-04-06 RX ADMIN — SODIUM CHLORIDE: 9 INJECTION, SOLUTION INTRAVENOUS at 00:47

## 2021-04-06 RX ADMIN — SODIUM CHLORIDE: 9 INJECTION, SOLUTION INTRAVENOUS at 11:11

## 2021-04-06 RX ADMIN — SODIUM CHLORIDE, PRESERVATIVE FREE 10 ML: 5 INJECTION INTRAVENOUS at 00:47

## 2021-04-06 ASSESSMENT — PAIN SCALES - GENERAL: PAINLEVEL_OUTOF10: 8

## 2021-04-06 NOTE — H&P
Hospitalist Group   History and Physical      CHIEF COMPLAINT: Lower abdominal pain    History of Present Illness:  64 y.o. male with a history of hyperlipidemia, hypertension, CVA presents with lower abdominal pain. Patient is poor historian unable to provide a lot of history. He mentioned that he was trying to move his bowels when he started having pain in the suprapubic region. He denied any fever. He mentioned burning on urination and foul-smelling urine. He did complain of flank pain on the left in the ER but he denied it when asked. It was reported that patient had 102F fever when checked by EMS. Patient received antibiotic in the ED before UA was done. REVIEW OF SYSTEMS:  no fevers, chills, cp, sob, n/v, ha, vision/hearing changes, wt changes, hot/cold flashes, other open skin lesions, diarrhea, constipation, dysuria/hematuria unless noted in HPI. Complete ROS performed with the patient and is otherwise negative. PMH:  Past Medical History:   Diagnosis Date    Hyperlipidemia     Hypertension     Osteoarthritis     S/P inguinal herniorrhaphy     left       Surgical History:  Past Surgical History:   Procedure Laterality Date    HERNIA REPAIR Bilateral     MI COLON CA SCRN NOT HI RSK IND N/A 10/6/2017    COLONOSCOPY performed by Alvin Vigil MD at [address] [place]         Medications Prior to Admission:    Prior to Admission medications    Medication Sig Start Date End Date Taking? Authorizing Provider   metoprolol tartrate (LOPRESSOR) 25 MG tablet GIVE 1 TABLET VIA PEG TUBE IN THE MORNING AND AT BEDTIME 3/16/21  Yes Damari Nunez PA-C   amLODIPine (NORVASC) 10 MG tablet 1 tablet via PEG daily.  3/16/21  Yes Damari Nunez PA-C   ofloxacin (OCUFLOX) 0.3 % solution  2/22/21  Yes Historical Provider, MD   aspirin 81 MG chewable tablet Take 81 mg by mouth daily   Yes Historical Provider, MD   melatonin 1 MG tablet Take 10 mg by mouth nightly as clear to auscultation bilaterally- no wheezes   Cardiovascular: normal rate, regular rhythm, normal S1 and S2, no murmurs   Abdomen: soft, has subpubic tenderness, non-distended, normal bowel sounds, no masses or organomegaly  Extremities: no cyanosis, clubbing   Musculoskeletal: normal range of motion, no joint swelling, deformity or tenderness  Neurologic: reflexes normal and symmetric, no cranial nerve deficit, unable to move his left arm and leg due to stroke      LABS:  Recent Labs     04/05/21  1645   *   K 3.6   CL 98   CO2 25   BUN 11   CREATININE 0.88   GLUCOSE 96   CALCIUM 9.3       Recent Labs     04/05/21  1645   WBC 20.6*   RBC 4.23*   HGB 9.2*   HCT 30.2*   MCV 71.4*   MCH 21.8*   MCHC 30.6*   RDW 17.0*   *       No results for input(s): POCGLU in the last 72 hours. CBC with Differential:    Lab Results   Component Value Date    WBC 20.6 04/05/2021    RBC 4.23 04/05/2021    RBC 4.64 05/18/2012    HGB 9.2 04/05/2021    HCT 30.2 04/05/2021     04/05/2021    MCV 71.4 04/05/2021    MCH 21.8 04/05/2021    MCHC 30.6 04/05/2021    RDW 17.0 04/05/2021    LYMPHOPCT 11.9 02/13/2021    MONOPCT 6.6 02/13/2021    BASOPCT 0.7 02/13/2021    MONOSABS 0.5 02/13/2021    LYMPHSABS 0.9 02/13/2021    EOSABS 0.6 02/13/2021    BASOSABS 0.1 02/13/2021     CMP:    Lab Results   Component Value Date     04/05/2021    K 3.6 04/05/2021    CL 98 04/05/2021    CO2 25 04/05/2021    BUN 11 04/05/2021    CREATININE 0.88 04/05/2021    GFRAA >60.0 04/05/2021    LABGLOM >60.0 04/05/2021    GLUCOSE 96 04/05/2021    GLUCOSE 92 05/18/2012    PROT 7.8 04/05/2021    LABALBU 3.7 04/05/2021    LABALBU 4.2 05/18/2012    CALCIUM 9.3 04/05/2021    BILITOT 0.4 04/05/2021    ALKPHOS 91 04/05/2021    AST 9 04/05/2021    ALT 7 04/05/2021       Radiology: Ct Abdomen Pelvis Wo Contrast Additional Contrast? None    Result Date: 4/5/2021  CT ABDOMEN PELVIS WO CONTRAST: 4/5/2021 CLINICAL HISTORY:  flank pain+fever .  COMPARISON: 2/13/2021. TECHNIQUE: Spiral images were obtained of the abdomen and pelvis without contrast. All CT scans at this facility use dose modulation, iterative reconstruction, and/or weight based dosing when appropriate to reduce radiation dose to as low as reasonably achievable. FINDINGS: Mild to moderate inflammation has developed around the nearly decompressed urinary bladder, and to a lesser extent adjacent to the mildly enlarged prostate gland, perirectal fat around an otherwise unremarkable rectum. This is probably secondary to uncomplicated cystitis, prostatitis and/or proctitis. There are no urinary tract calculi, hydronephrosis, or other significant changes from the prior study identified There is no abnormal bowel or biliary dilatation, ascites, significant lymphadenopathy, hernias, or other significant changes identified. An inferior vena cava filter and mild atherosclerotic plaquing of a normal caliber abdominal aorta and branch vessels is again noted. The unenhanced liver, gallbladder, spleen, pancreas, adrenal glands, kidneys, great vessels, unopacified bowel loops, and additional images of pelvis appear within normal limits. Mild probable dependent atelectasis is present of the visualized lung bases. The visualized musculoskeletal structures are unremarkable. MILD INFLAMMATION AROUND THE NEARLY DECOMPRESSED URINARY BLADDER, MILDLY ENLARGED PROSTATE AND OTHERWISE UNREMARKABLE RECTUM, WHICH IS PROBABLY CYSTITIS, PROSTATITIS AND/OR PROCTITIS. NO COMPLICATION, URINARY TRACT CALCULI, OBSTRUCTION, OR OTHER SIGNIFICANT CHANGES FROM 2/13/2021 IDENTIFIED. ASSESSMENT/ PLAN[de-identified]      Active Problems:    Acute cystitis with hematuria  Resolved Problems:    * No resolved hospital problems. *      1. Acute cystitis/sepsis   Symptoms of subpubic tenderness, burning on urination  ` Fever, leukocytosis, tachycardia on presentation   Receive 2 L normal saline boluses in the ER.    Started on Rocephin before UA was done   UA showed leukocyte esterase but negative bacteria   Acute bacterial prostatitis is also in the differential   Will continue Rocephin and monitor closely   Will consult ID if needed   IV fluids    Code Status: Full  DVT prophylax Lovenox    Electronically signed by Servando Genao MD on 4/6/2021 at 2:02 AM      NOTE: This report was transcribed using voice recognition software. Every effort was made to ensure accuracy; however, inadvertent computerized transcription errors may be present.

## 2021-04-06 NOTE — TELEPHONE ENCOUNTER
We can't do a VV if he is still admitted; so if he is, we can totally work him in to my schedule in-person OR VV when he is discharged.

## 2021-04-06 NOTE — TELEPHONE ENCOUNTER
Patient currently admitted. Can we see what I need to addend. The reason why I didn't say he can make his own decisions is that he currently needs ASSISTANCE with paying bills, submitting payments, etc.  He is very limited post-stroke with his ability to self-care. He is of sound mind. Family helps assist with bill pay, finances in re: to his wishes/money management.

## 2021-04-06 NOTE — TELEPHONE ENCOUNTER
Zeinab Baker called in just to let you know patient did get admitted to hospital yesterday. He does have a UTI and they will be running more tests on him. He does have an appt.  On Thursday and felicia said it may have to be VV if he is in the hospital still

## 2021-04-06 NOTE — ED NOTES
Report called to 2W spoke with Chung Rosa RN. Tele applied to patient. VSS and no acute s/s of distress at this time.        Diann La RN  04/05/21 6690

## 2021-04-06 NOTE — CARE COORDINATION
[facility] Case Management Initial Discharge Assessment    Met with Patient to discuss discharge plan. PCP: Teressa Winters PA-C                                Date of Last Visit: 3/9    If no PCP, list provided? N/A    Discharge Planning    Living Arrangements: independently at home    Who do you live with? Sister Caty Rachel    Who helps you with your care:  family    If lives at home:     Do you have any barriers navigating in your home? yes - uses w/ch, leg contracted    Patient can perform ADL? Yes-WITH HELP    Current Services (outpatient and in home) :  [address] [facility] ([address])    Dialysis: No    Is transportation available to get to your appointments? Yes    DME Equipment:  yes [facility]    Respiratory equipment: None    Respiratory provider:  no     Pharmacy:  yes - [facility] [address] with Medication Assistance Program?  No      Patient agreeable to [facility]? Yes, [address]    Patient agreeable to SNF/Rehab? Declined    Other discharge needs identified? N/A    Freedom of choice list provided with basic dialogue that supports the patient's individualized plan of care/goals and shares the quality data associated with the providers. Yes    Does Patient Have a High-Risk for Readmission Diagnosis (CHF, PN, MI, COPD)? No    The plan for Transition of Care is related to the following treatment goals:LABS, PAIN CONTROL, IMAGING, VS, ABX    Initial Discharge Plan? (Note: please see concurrent daily documentation for any updates after initial note).     [address]    The Patient and/or patient representative: Dmitri Donohue was provided with choice of any post-acute providers for care and equipment and agrees with discharge plan  Yes    Electronically signed by Nilda Bertrand RN on 4/6/2021 at 2:00 PM,o

## 2021-04-06 NOTE — PLAN OF CARE
57-year-old male with history of CVA and left-sided hemiplegia presents from home with suprapubic tenderness, dysuria, fevers. He was found to have sepsis secondary to urinary tract infection. Currently on ceftriaxone with culture data pending.     Kevin Rodriguez, DO

## 2021-04-06 NOTE — FLOWSHEET NOTE
2215- Admission assessment completed at this time, pt A&O x4, pt has delayed responses when spoken to pupils are 2mm round and brisk, RUE and RLE both are moderate to strong in strength, LUE and LLE are flaccid and has absent strength, pt has no adventitious heart or lung sounds, abdomen is flat and sift and pt complains of tenderness on left side where peg tube was once placed, pt wears a helmet to protect his head from a brain surgery he said he had back in December, surgical scar is clean dry and intact, pt complains of flank pain and it hurting when he pees, pt shows little expression but is cam and cooperative -KG   0440- pt up most of the night yelling out in pain from trying to go to the bathroom, also complaining pain in his leg -KG  0640- prn toradol given for pain, pt also had a bowel movement at this time.  Pt seems to be more comfortable after pain medication and getting cleaned up -KG   Electronically signed by Linda Simms RN on 4/6/2021

## 2021-04-06 NOTE — CARE COORDINATION
SPOKE WITH PATIENT AND WITH PERMISSION PATIENT'S BROTHER OMAR WAS ALSO CONTACTED TO VERIFY INFORMATION AND DC PLAN. PLAN IS TO RETURN HOME WITH PATIENT'S SISTER GREG AND CONTINUE WITH [facility] CARE. PATIENT REQUESTING RX FOR WALKER.

## 2021-04-07 LAB
ALBUMIN SERPL-MCNC: 3.2 G/DL (ref 3.5–4.6)
ALP BLD-CCNC: 78 U/L (ref 35–104)
ALT SERPL-CCNC: 8 U/L (ref 0–41)
ANION GAP SERPL CALCULATED.3IONS-SCNC: 9 MEQ/L (ref 9–15)
ANISOCYTOSIS: ABNORMAL
AST SERPL-CCNC: 9 U/L (ref 0–40)
BASOPHILS ABSOLUTE: 0 K/UL (ref 0–0.2)
BASOPHILS RELATIVE PERCENT: 0.6 %
BILIRUB SERPL-MCNC: <0.2 MG/DL (ref 0.2–0.7)
BUN BLDV-MCNC: 6 MG/DL (ref 8–23)
CALCIUM SERPL-MCNC: 8.6 MG/DL (ref 8.5–9.9)
CHLORIDE BLD-SCNC: 107 MEQ/L (ref 95–107)
CO2: 21 MEQ/L (ref 20–31)
CREAT SERPL-MCNC: 0.62 MG/DL (ref 0.7–1.2)
EOSINOPHILS ABSOLUTE: 0.2 K/UL (ref 0–0.7)
EOSINOPHILS RELATIVE PERCENT: 2.2 %
GFR AFRICAN AMERICAN: >60
GFR NON-AFRICAN AMERICAN: >60
GLOBULIN: 3.4 G/DL (ref 2.3–3.5)
GLUCOSE BLD-MCNC: 98 MG/DL (ref 70–99)
HCT VFR BLD CALC: 26.4 % (ref 42–52)
HEMOGLOBIN: 8.3 G/DL (ref 14–18)
HYPOCHROMIA: ABNORMAL
LYMPHOCYTES ABSOLUTE: 0.9 K/UL (ref 1–4.8)
LYMPHOCYTES RELATIVE PERCENT: 13 %
MCH RBC QN AUTO: 21.9 PG (ref 27–31.3)
MCHC RBC AUTO-ENTMCNC: 31.2 % (ref 33–37)
MCV RBC AUTO: 70.2 FL (ref 80–100)
MICROCYTES: ABNORMAL
MONOCYTES ABSOLUTE: 0.9 K/UL (ref 0.2–0.8)
MONOCYTES RELATIVE PERCENT: 12.1 %
NEUTROPHILS ABSOLUTE: 5.2 K/UL (ref 1.4–6.5)
NEUTROPHILS RELATIVE PERCENT: 72.1 %
PDW BLD-RTO: 16.9 % (ref 11.5–14.5)
PLATELET # BLD: 371 K/UL (ref 130–400)
PLATELET SLIDE REVIEW: NORMAL
POIKILOCYTES: ABNORMAL
POTASSIUM SERPL-SCNC: 3.5 MEQ/L (ref 3.4–4.9)
RBC # BLD: 3.76 M/UL (ref 4.7–6.1)
SLIDE REVIEW: ABNORMAL
SODIUM BLD-SCNC: 137 MEQ/L (ref 135–144)
TARGET CELLS: ABNORMAL
TOTAL PROTEIN: 6.6 G/DL (ref 6.3–8)
URINE CULTURE, ROUTINE: NORMAL
WBC # BLD: 7.2 K/UL (ref 4.8–10.8)

## 2021-04-07 PROCEDURE — 1210000000 HC MED SURG R&B

## 2021-04-07 PROCEDURE — 2580000003 HC RX 258: Performed by: INTERNAL MEDICINE

## 2021-04-07 PROCEDURE — 36415 COLL VENOUS BLD VENIPUNCTURE: CPT

## 2021-04-07 PROCEDURE — 6370000000 HC RX 637 (ALT 250 FOR IP): Performed by: INTERNAL MEDICINE

## 2021-04-07 PROCEDURE — 80053 COMPREHEN METABOLIC PANEL: CPT

## 2021-04-07 PROCEDURE — 6360000002 HC RX W HCPCS: Performed by: INTERNAL MEDICINE

## 2021-04-07 PROCEDURE — 85025 COMPLETE CBC W/AUTO DIFF WBC: CPT

## 2021-04-07 PROCEDURE — 99254 IP/OBS CNSLTJ NEW/EST MOD 60: CPT | Performed by: INTERNAL MEDICINE

## 2021-04-07 RX ORDER — DIPHENHYDRAMINE HYDROCHLORIDE 50 MG/ML
25 INJECTION INTRAMUSCULAR; INTRAVENOUS ONCE
Status: COMPLETED | OUTPATIENT
Start: 2021-04-07 | End: 2021-04-08

## 2021-04-07 RX ADMIN — GABAPENTIN 100 MG: 100 CAPSULE ORAL at 10:44

## 2021-04-07 RX ADMIN — ASPIRIN 81 MG: 81 TABLET, CHEWABLE ORAL at 10:44

## 2021-04-07 RX ADMIN — GABAPENTIN 100 MG: 100 CAPSULE ORAL at 16:52

## 2021-04-07 RX ADMIN — ENOXAPARIN SODIUM 40 MG: 40 INJECTION SUBCUTANEOUS at 10:44

## 2021-04-07 RX ADMIN — KETOROLAC TROMETHAMINE 15 MG: 15 INJECTION, SOLUTION INTRAMUSCULAR; INTRAVENOUS at 15:02

## 2021-04-07 RX ADMIN — GABAPENTIN 100 MG: 100 CAPSULE ORAL at 21:23

## 2021-04-07 RX ADMIN — METOPROLOL TARTRATE 25 MG: 25 TABLET, FILM COATED ORAL at 10:44

## 2021-04-07 RX ADMIN — AMLODIPINE BESYLATE 10 MG: 10 TABLET ORAL at 10:44

## 2021-04-07 RX ADMIN — KETOROLAC TROMETHAMINE 15 MG: 15 INJECTION, SOLUTION INTRAMUSCULAR; INTRAVENOUS at 04:28

## 2021-04-07 RX ADMIN — CEFTRIAXONE SODIUM 1000 MG: 1 INJECTION, POWDER, FOR SOLUTION INTRAMUSCULAR; INTRAVENOUS at 18:15

## 2021-04-07 RX ADMIN — ATORVASTATIN CALCIUM 10 MG: 10 TABLET, FILM COATED ORAL at 10:44

## 2021-04-07 RX ADMIN — SODIUM CHLORIDE, PRESERVATIVE FREE 10 ML: 5 INJECTION INTRAVENOUS at 10:48

## 2021-04-07 RX ADMIN — METOPROLOL TARTRATE 25 MG: 25 TABLET, FILM COATED ORAL at 21:23

## 2021-04-07 RX ADMIN — SODIUM CHLORIDE, PRESERVATIVE FREE 10 ML: 5 INJECTION INTRAVENOUS at 21:24

## 2021-04-07 ASSESSMENT — ENCOUNTER SYMPTOMS
GASTROINTESTINAL NEGATIVE: 1
RESPIRATORY NEGATIVE: 1

## 2021-04-07 ASSESSMENT — PAIN SCALES - GENERAL
PAINLEVEL_OUTOF10: 0
PAINLEVEL_OUTOF10: 8

## 2021-04-07 NOTE — TELEPHONE ENCOUNTER
Spoke with Julianne Keita today. She was not very happy with what is going on. She stated that noone is taking care of her brother properly and that you have not went over to the hospital to check on him. I informed her since he is admitted there are nurses and on call providers with patient. She also goes on to say that patient is not able to collect his money from the [employer] because it took forever for the paper to get signed. I apologized about that situation. She said she will be looking for a new family doctor for patient.

## 2021-04-07 NOTE — PROGRESS NOTES
INF DIS CONSULT CALLED TO DR Bridget Mao ANSWERING SERVICE Electronically signed by Sasha Williamson on 4/7/2021 at 12:42 PM

## 2021-04-07 NOTE — FLOWSHEET NOTE
2100- shift assessment completed at this time, pt A&O x4, pt has a delayed response when spoken to but responds appropriately, pts LUE is flaccid, pts LLE is contracted, the RUE and RLE are moderate in strength, no adventitious heart or lung sounds heard, pt complains of some tenderness in his abdomen, pt incontinent of both bowel and bladder, pt denies any pain at this time -KG   Electronically signed by Saige Laguerre RN on 4/7/2021

## 2021-04-07 NOTE — PROGRESS NOTES
Physician Progress Note    4/7/2021   1:51 PM    Name:  Veronica Patterson  MRN:    [id number]      Day: 2     Admit Date: 4/5/2021  4:20 PM  PCP: Juventino Nunez PA-C    Code Status:  Full Code    Subjective:     Denies complaints this time. T-max 99.9 overnight. He denies nausea, chest pain, abdominal pain, dyspnea. No further urinary symptoms.     Current Facility-Administered Medications   Medication Dose Route Frequency Provider Last Rate Last Admin    cefTRIAXone (ROCEPHIN) 1000 mg IVPB in 50 mL D5W minibag  1,000 mg Intravenous Q24H Jaden Hamilton MD   Stopped at 04/06/21 2131    ketorolac (TORADOL) injection 15 mg  15 mg Intravenous Q6H PRN Jaden Hamilton MD   15 mg at 04/07/21 0428    amLODIPine (NORVASC) tablet 10 mg  10 mg Oral Daily Jaden Hamilton MD   10 mg at 04/07/21 1044    aspirin chewable tablet 81 mg  81 mg Oral Daily Jaden Hamilton MD   81 mg at 04/07/21 1044    atorvastatin (LIPITOR) tablet 10 mg  10 mg Oral Daily Jaden Hamilton MD   10 mg at 04/07/21 1044    gabapentin (NEURONTIN) capsule 100 mg  100 mg Oral TID Jaden Hamilton MD   100 mg at 04/07/21 1044    metoprolol tartrate (LOPRESSOR) tablet 25 mg  25 mg Oral BID Jaden Hamilton MD   25 mg at 04/07/21 1044    0.9 % sodium chloride bolus  1,000 mL Intravenous Once Olivier Roman DO        0.9 % sodium chloride bolus  1,000 mL Intravenous Once Onur Del Toro MD        sodium chloride flush 0.9 % injection 10 mL  10 mL Intravenous 2 times per day Jaden Hamilton MD   10 mL at 04/07/21 1048    sodium chloride flush 0.9 % injection 10 mL  10 mL Intravenous PRN Jaden Hamilton MD        0.9 % sodium chloride infusion  25 mL Intravenous PRN Jaden Hamilton MD        enoxaparin (LOVENOX) injection 40 mg  40 mg Subcutaneous Daily Jaden Hamilton MD   40 mg at 04/07/21 1044    promethazine (PHENERGAN) tablet 12.5 mg  12.5 mg Oral Q6H PRN Jaden Hamilton MD        Or    ondansetron [facility]) injection 4 mg  4 mg Intravenous Q6H PRN Rebecca Aguilera Francisco Javier Cervantes MD        polyethylene glycol (GLYCOLAX) packet 17 g  17 g Oral Daily PRN Audelia Nava MD        acetaminophen (TYLENOL) tablet 650 mg  650 mg Oral Q6H PRN Audelia Nava MD        Or   Elle Collins acetaminophen (TYLENOL) suppository 650 mg  650 mg Rectal Q6H PRN Audelia Nava MD           Physical Examination:      Vitals:  /71   Pulse 88   Temp 98.2 °F (36.8 °C) (Oral)   Resp 18   Ht 5' 5\" (1.651 m)   Wt 150 lb (68 kg)   SpO2 100%   BMI 24.96 kg/m²   Temp (24hrs), Av.1 °F (37.3 °C), Min:98.2 °F (36.8 °C), Max:99.9 °F (37.7 °C)      General appearance: Chronically ill-appearing. Left-sided weakness. Will follow commands and is conversational.  Lungs: clear to auscultation bilaterally, normal effort  Heart: regular rate and rhythm, no murmur  Abdomen: soft, nontender, nondistended, bowel sounds present, no masses  Extremities: no edema, redness, tenderness in the calves. Cap refill <2s  Skin: no gross lesions, rashes  Rectal exam: Patient yelled out every time his prostate was palpated. No obvious edema or bogginess appreciated. Data:     Labs:  Recent Labs     21  0507 21  0455   WBC 16.1* 7.2   HGB 8.7* 8.3*   * 371     Recent Labs     21  0507 21  0455    137   K 3.6 3.5    107   CO2 22 21   BUN 11 6*   CREATININE 0.75 0.62*   GLUCOSE 92 98     Recent Labs     21  0507 21  0455   AST 8 9   ALT 6 8   BILITOT 0.3 <0.2   ALKPHOS 82 78       Assessment and Plan:        1. Sepsis suspect secondary to UTI. Urine culture contaminated. Also differential is prostatitis  -ID consulted to help determine if prostatitis is present. Culture data thus far negative  -Continue ceftriaxone. Fever curve improving.   Will transition to p.o. 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   Resume existing home care at discharge    >35 minutes in total care time    Electronically signed by Susana Judge DO on 4/7/2021 at 1:51 PM

## 2021-04-07 NOTE — CONSULTS
Review of Systems: All 14 review of systems negative other than as stated above    Social History     Tobacco Use    Smoking status: Former Smoker     Packs/day: 1.00     Years: 20.00     Pack years: 20.00     Types: Cigarettes     Start date: 0     Quit date: 2020     Years since quittin.2    Smokeless tobacco: Never Used   Substance Use Topics    Alcohol use: Yes     Alcohol/week: 18.0 standard drinks     Types: 18 Cans of beer per week    Drug use: Yes     Types: Marijuana     Comment: 2X/ month         Past Medical History:   Diagnosis Date    Hyperlipidemia     Hypertension     Osteoarthritis     S/P inguinal herniorrhaphy     left           Past Surgical History:   Procedure Laterality Date    HERNIA REPAIR Bilateral     CO COLON CA SCRN NOT HI RSK IND N/A 10/6/2017    COLONOSCOPY performed by Felipe Caceres MD at [address] [place] [address]           No current facility-administered medications on file prior to encounter. Current Outpatient Medications on File Prior to Encounter   Medication Sig Dispense Refill    metoprolol tartrate (LOPRESSOR) 25 MG tablet GIVE 1 TABLET VIA PEG TUBE IN THE MORNING AND AT BEDTIME 180 tablet 4    amLODIPine (NORVASC) 10 MG tablet 1 tablet via PEG daily. 90 tablet 4    ofloxacin (OCUFLOX) 0.3 % solution       aspirin 81 MG chewable tablet Take 81 mg by mouth daily      melatonin 1 MG tablet Take 10 mg by mouth nightly as needed      gabapentin (NEURONTIN) 100 MG capsule Take 1 capsule by mouth 3 times daily for 180 days. Intended supply: 90 days 90 capsule 2    Handicap Placard MISC by Does not apply route Good for 5 years.   Expires 3/10/2026. 1 each 0    metoprolol-hydrochlorothiazide (LOPRESSOR HCT) 100-25 MG per tablet take 1 tablet by mouth once daily 30 tablet 11    cetirizine (ZYRTEC) 10 MG tablet Take 1 tablet by mouth daily 30 tablet 11    atorvastatin (LIPITOR) 10 MG tablet take 1 tablet by mouth once daily 30 tablet 11    oxyCODONE (ROXICODONE) 5 MG/5ML solution          No Known Allergies      No family history on file. Physical Exam:      Physical Exam   Constitutional: No distress. HENT:   Healed scars on head no redness warmth no drainage   Neck: Normal range of motion. No JVD present. No tracheal deviation present. No thyromegaly present. Cardiovascular:   No murmur heard. Pulmonary/Chest: Breath sounds normal. No respiratory distress. He has no wheezes. He has no rales. He exhibits no tenderness. Abdominal: Bowel sounds are normal. He exhibits no distension and no mass. There is no abdominal tenderness. There is no rebound and no guarding. PEG tube site shows no redness   Musculoskeletal:         General: No tenderness or edema. Lymphadenopathy:     He has no cervical adenopathy. Neurological: He is alert. Left-sided weakness   Skin: Skin is warm. No rash noted. He is not diaphoretic. No erythema. No pallor. Blood pressure 133/71, pulse 88, temperature 98.2 °F (36.8 °C), temperature source Oral, resp. rate 18, height 5' 5\" (1.651 m), weight 150 lb (68 kg), SpO2 100 %.       .   Lab Results   Component Value Date    WBC 7.2 04/07/2021    HGB 8.3 (L) 04/07/2021    HCT 26.4 (L) 04/07/2021    MCV 70.2 (L) 04/07/2021     04/07/2021     Lab Results   Component Value Date     04/07/2021    K 3.5 04/07/2021     04/07/2021    CO2 21 04/07/2021    BUN 6 04/07/2021    CREATININE 0.62 04/07/2021    GLUCOSE 98 04/07/2021    GLUCOSE 92 05/18/2012    CALCIUM 8.6 04/07/2021                ASSESSMENT:  Patient Active Problem List   Diagnosis    Osteoarthritis    Essential hypertension    Hyperlipidemia    Cerebrovascular accident (CVA) due to embolism of precerebral artery (HCC)    Paralysis ([address]    PEG (percutaneous endoscopic gastrostomy) status ([address]    Dysphagia due to recent cerebral infarction    History of stroke    History of substance abuse ([address]

## 2021-04-08 PROBLEM — N39.0 SEPSIS DUE TO URINARY TRACT INFECTION (HCC): Status: ACTIVE | Noted: 2021-04-08

## 2021-04-08 PROBLEM — A41.9 SEPSIS DUE TO URINARY TRACT INFECTION (HCC): Status: ACTIVE | Noted: 2021-04-08

## 2021-04-08 LAB
ANION GAP SERPL CALCULATED.3IONS-SCNC: 11 MEQ/L (ref 9–15)
BASOPHILS ABSOLUTE: 0 K/UL (ref 0–0.2)
BASOPHILS RELATIVE PERCENT: 0.8 %
BUN BLDV-MCNC: 5 MG/DL (ref 8–23)
CALCIUM SERPL-MCNC: 8.7 MG/DL (ref 8.5–9.9)
CHLORIDE BLD-SCNC: 104 MEQ/L (ref 95–107)
CO2: 23 MEQ/L (ref 20–31)
CREAT SERPL-MCNC: 0.64 MG/DL (ref 0.7–1.2)
EOSINOPHILS ABSOLUTE: 0.1 K/UL (ref 0–0.7)
EOSINOPHILS RELATIVE PERCENT: 2.7 %
GFR AFRICAN AMERICAN: >60
GFR NON-AFRICAN AMERICAN: >60
GLUCOSE BLD-MCNC: 94 MG/DL (ref 70–99)
HCT VFR BLD CALC: 25.1 % (ref 42–52)
HEMOGLOBIN: 7.9 G/DL (ref 14–18)
LYMPHOCYTES ABSOLUTE: 0.8 K/UL (ref 1–4.8)
LYMPHOCYTES RELATIVE PERCENT: 17.8 %
MAGNESIUM: 1.8 MG/DL (ref 1.7–2.4)
MCH RBC QN AUTO: 21.9 PG (ref 27–31.3)
MCHC RBC AUTO-ENTMCNC: 31.4 % (ref 33–37)
MCV RBC AUTO: 69.6 FL (ref 80–100)
MONOCYTES ABSOLUTE: 0.7 K/UL (ref 0.2–0.8)
MONOCYTES RELATIVE PERCENT: 14.4 %
NEUTROPHILS ABSOLUTE: 3 K/UL (ref 1.4–6.5)
NEUTROPHILS RELATIVE PERCENT: 64.3 %
PDW BLD-RTO: 17.1 % (ref 11.5–14.5)
PLATELET # BLD: 399 K/UL (ref 130–400)
POTASSIUM REFLEX MAGNESIUM: 3 MEQ/L (ref 3.4–4.9)
RBC # BLD: 3.61 M/UL (ref 4.7–6.1)
SODIUM BLD-SCNC: 138 MEQ/L (ref 135–144)
WBC # BLD: 4.6 K/UL (ref 4.8–10.8)

## 2021-04-08 PROCEDURE — 83735 ASSAY OF MAGNESIUM: CPT

## 2021-04-08 PROCEDURE — 1210000000 HC MED SURG R&B

## 2021-04-08 PROCEDURE — 87086 URINE CULTURE/COLONY COUNT: CPT

## 2021-04-08 PROCEDURE — 6360000002 HC RX W HCPCS: Performed by: INTERNAL MEDICINE

## 2021-04-08 PROCEDURE — 2580000003 HC RX 258: Performed by: INTERNAL MEDICINE

## 2021-04-08 PROCEDURE — 80048 BASIC METABOLIC PNL TOTAL CA: CPT

## 2021-04-08 PROCEDURE — 99232 SBSQ HOSP IP/OBS MODERATE 35: CPT | Performed by: INTERNAL MEDICINE

## 2021-04-08 PROCEDURE — 36415 COLL VENOUS BLD VENIPUNCTURE: CPT

## 2021-04-08 PROCEDURE — 97162 PT EVAL MOD COMPLEX 30 MIN: CPT

## 2021-04-08 PROCEDURE — 6370000000 HC RX 637 (ALT 250 FOR IP): Performed by: INTERNAL MEDICINE

## 2021-04-08 PROCEDURE — 97166 OT EVAL MOD COMPLEX 45 MIN: CPT

## 2021-04-08 PROCEDURE — 6360000002 HC RX W HCPCS: Performed by: NURSE PRACTITIONER

## 2021-04-08 PROCEDURE — 85025 COMPLETE CBC W/AUTO DIFF WBC: CPT

## 2021-04-08 RX ORDER — POTASSIUM CHLORIDE 20 MEQ/1
60 TABLET, EXTENDED RELEASE ORAL 2 TIMES DAILY WITH MEALS
Status: COMPLETED | OUTPATIENT
Start: 2021-04-08 | End: 2021-04-08

## 2021-04-08 RX ADMIN — POTASSIUM CHLORIDE 60 MEQ: 1500 TABLET, EXTENDED RELEASE ORAL at 17:52

## 2021-04-08 RX ADMIN — AMLODIPINE BESYLATE 10 MG: 10 TABLET ORAL at 09:05

## 2021-04-08 RX ADMIN — GABAPENTIN 100 MG: 100 CAPSULE ORAL at 09:05

## 2021-04-08 RX ADMIN — SODIUM CHLORIDE, PRESERVATIVE FREE 10 ML: 5 INJECTION INTRAVENOUS at 09:05

## 2021-04-08 RX ADMIN — DIPHENHYDRAMINE HYDROCHLORIDE 25 MG: 50 INJECTION, SOLUTION INTRAMUSCULAR; INTRAVENOUS at 09:09

## 2021-04-08 RX ADMIN — ASPIRIN 81 MG: 81 TABLET, CHEWABLE ORAL at 09:05

## 2021-04-08 RX ADMIN — METOPROLOL TARTRATE 25 MG: 25 TABLET, FILM COATED ORAL at 09:05

## 2021-04-08 RX ADMIN — ATORVASTATIN CALCIUM 10 MG: 10 TABLET, FILM COATED ORAL at 09:05

## 2021-04-08 RX ADMIN — POTASSIUM CHLORIDE 60 MEQ: 1500 TABLET, EXTENDED RELEASE ORAL at 10:15

## 2021-04-08 RX ADMIN — METOPROLOL TARTRATE 25 MG: 25 TABLET, FILM COATED ORAL at 21:17

## 2021-04-08 RX ADMIN — GABAPENTIN 100 MG: 100 CAPSULE ORAL at 21:18

## 2021-04-08 RX ADMIN — GABAPENTIN 100 MG: 100 CAPSULE ORAL at 14:10

## 2021-04-08 RX ADMIN — CEFTRIAXONE SODIUM 1000 MG: 1 INJECTION, POWDER, FOR SOLUTION INTRAMUSCULAR; INTRAVENOUS at 17:52

## 2021-04-08 RX ADMIN — SODIUM CHLORIDE, PRESERVATIVE FREE 10 ML: 5 INJECTION INTRAVENOUS at 21:18

## 2021-04-08 RX ADMIN — ENOXAPARIN SODIUM 40 MG: 40 INJECTION SUBCUTANEOUS at 09:05

## 2021-04-08 ASSESSMENT — PAIN SCALES - GENERAL
PAINLEVEL_OUTOF10: 0
PAINLEVEL_OUTOF10: 0

## 2021-04-08 ASSESSMENT — ENCOUNTER SYMPTOMS
GASTROINTESTINAL NEGATIVE: 1
RESPIRATORY NEGATIVE: 1

## 2021-04-08 NOTE — PROGRESS NOTES
Physician Progress Note    4/8/2021   2:36 PM    Name:  Song Infante  MRN:    [id number]      Day: 3     Admit Date: 4/5/2021  4:20 PM  PCP: Tessie Nunez PA-C    Code Status:  Full Code    Subjective:     He denies complaints. He is asking to go home.     Current Facility-Administered Medications   Medication Dose Route Frequency Provider Last Rate Last Admin    potassium chloride (KLOR-CON M) extended release tablet 60 mEq  60 mEq Oral BID  Christianne Sandhoff, DO   60 mEq at 04/08/21 1015    cefTRIAXone (ROCEPHIN) 1000 mg IVPB in 50 mL D5W minibag  1,000 mg Intravenous Q24H Mango Wiseman MD   Stopped at 04/07/21 1853    ketorolac (TORADOL) injection 15 mg  15 mg Intravenous Q6H PRN Mango Wiseman MD   15 mg at 04/07/21 1502    amLODIPine (NORVASC) tablet 10 mg  10 mg Oral Daily Mango Wiseman MD   10 mg at 04/08/21 0905    aspirin chewable tablet 81 mg  81 mg Oral Daily Mango Wiseman MD   81 mg at 04/08/21 0905    atorvastatin (LIPITOR) tablet 10 mg  10 mg Oral Daily Mango Wiseman MD   10 mg at 04/08/21 0905    gabapentin (NEURONTIN) capsule 100 mg  100 mg Oral TID Mango Wiseman MD   100 mg at 04/08/21 1410    metoprolol tartrate (LOPRESSOR) tablet 25 mg  25 mg Oral BID Mango Wiseman MD   25 mg at 04/08/21 0905    0.9 % sodium chloride bolus  1,000 mL Intravenous Once [facility], DO        0.9 % sodium chloride bolus  1,000 mL Intravenous Once Candelaria Del Angel MD        sodium chloride flush 0.9 % injection 10 mL  10 mL Intravenous 2 times per day Mango Wiseman MD   10 mL at 04/08/21 0905    sodium chloride flush 0.9 % injection 10 mL  10 mL Intravenous PRN Mango Wiseman MD        0.9 % sodium chloride infusion  25 mL Intravenous PRN Mango Wiseman MD        enoxaparin (LOVENOX) injection 40 mg  40 mg Subcutaneous Daily Mango Wiseman MD   40 mg at 04/08/21 0905    promethazine (PHENERGAN) tablet 12.5 mg  12.5 mg Oral Q6H PRN Mango Wiseman MD        Or    ondansetron [facility]) injection 4 mg  4 mg Intravenous Q6H PRN Kevin Mackenzie MD        polyethylene glycol (GLYCOLAX) packet 17 g  17 g Oral Daily PRN Kevin Mackenzie MD        acetaminophen (TYLENOL) tablet 650 mg  650 mg Oral Q6H PRN Kevin Mackenzie MD        Or   [facility] acetaminophen (TYLENOL) suppository 650 mg  650 mg Rectal Q6H PRN Kevin Mackenzie MD           Physical Examination:      Vitals:  BP (!) 162/81   Pulse 105   Temp 99 °F (37.2 °C) (Oral)   Resp 20   Ht 5' 5\" (1.651 m)   Wt 150 lb (68 kg)   SpO2 100%   BMI 24.96 kg/m²   Temp (24hrs), Av.5 °F (36.9 °C), Min:98.2 °F (36.8 °C), Max:99 °F (37.2 °C)      General appearance: Chronically ill-appearing. Left-sided weakness. Will follow commands and is conversational.  Lungs: clear to auscultation bilaterally, normal effort  Heart: regular rate and rhythm, no murmur  Abdomen: soft, nontender, nondistended, bowel sounds present, no masses  Extremities: no edema, redness, tenderness in the calves. Cap refill <2s  Skin: no gross lesions, rashes  Rectal exam: Patient yelled out every time his prostate was palpated. No obvious edema or bogginess appreciated. Data:     Labs:  Recent Labs     21  0455 21  0504   WBC 7.2 4.6*   HGB 8.3* 7.9*    399     Recent Labs     21  0455 21  0504    138   K 3.5 3.0*    104   CO2 21 23   BUN 6* 5*   CREATININE 0.62* 0.64*   GLUCOSE 98 94     Recent Labs     21  0507 21  0455   AST 8 9   ALT 6 8   BILITOT 0.3 <0.2   ALKPHOS 82 78       Assessment and Plan:        1. Sepsis suspect secondary to UTI. Urine culture contaminated. -ID consulted to help determine if prostatitis is present-they feel this is UTI. Repeat urine culture pending  -Continue ceftriaxone. Fever curve improving.   Will transition to p.o. 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 Resume existing home care at discharge.   Can discharge when cleared by infectious disease    >35 minutes in total care time    Electronically signed by Antonina Jung DO on 4/8/2021 at 2:36 PM

## 2021-04-08 NOTE — PROGRESS NOTES
[facility] OCCUPATIONAL THERAPY EVALUATION - ACUTE     NAME: Yajaira Chiang  : 1960 (64 y.o.)  MRN: [id number]  CODE STATUS: Full Code  [facility]    Date of Service: 2021    Patient Diagnosis(es): Acute cystitis with hematuria [N30.01]   Chief Complaint   Patient presents with    Flank Pain     Patient Active Problem List    Diagnosis Date Noted    Sepsis due to urinary tract infection ([address] 2021    Acute cystitis with hematuria 2021    Gastrostomy malfunction ([address] 2021    Cerebrovascular accident (CVA) due to embolism of precerebral artery ([address] 03/10/2021    Paralysis ([address] 03/10/2021    PEG (percutaneous endoscopic gastrostomy) status ([address] 03/10/2021    Essential hypertension 2021    Dysphagia due to recent cerebral infarction 2021    History of stroke 2021    History of substance abuse ([address] 2021    Left hemiparesis ([address] 2021    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 Safety Devices: Safety Devices  Safety Devices in place: Yes  Type of devices: All fall risk precautions in place   Initially in place: No    Subjective:\"I live with [facility]. \"       Pain Reassessment: 0/10 pain reported during session          Prior Level of Function:  Social/Functional History  Lives With: Other (comment)(sister)  Type of Home: Apartment  Home Layout: One level  Bathroom Shower/Tub: Tub/Shower unit  Home Equipment: [employer]  ADL Assistance: Needs assistance  Homemaking Responsibilities: No  Ambulation Assistance: Needs assistance(Pt reports that he uses w/c)  Transfer Assistance: Needs assistance    OBJECTIVE:     Orientation Status:  Orientation  Overall Orientation Status: Within Functional Limits    Observation:  Observation/Palpation  Posture: Poor  Observation: left posterior lean noted. Left UE flaccid    Cognition Status:  Cognition  Cognition Comment: follows one step commands consistently    Perception Status:  Perception  Overall Perceptual Status: Impaired  Unilateral Attention: Cues to attend left visual field, Cues to maintain midline in sitting, Cues to maintain midline in standing, Cues to attend to left side of body    Sensation Status:  Sensation  Overall Sensation Status: Impaired(L sided UE and LE deficits to light touch)    Vision and Hearing Status:  Vision  Vision: Impaired  Vision Exceptions: Wears glasses at all times  Hearing  Hearing: Within functional limits     ROM:   LUE PROM (degrees)  LUE General PROM: limited wrist all planes  Left Hand PROM (degrees)  Left Hand PROM: WFL  RUE AROM (degrees)  RUE AROM : WFL  Right Hand AROM (degrees)  Right Hand AROM: WFL    Strength:  LUE Strength  LUE Strength Comment: Left UE flaccid  RUE Strength  Gross RUE Strength: WFL  R Hand General: 4/5    Coordination, Tone, Quality of Movement:    Tone RUE  RUE Tone: Normotonic  Tone LUE  LUE Tone: Hypotonic  Coordination  Movements Are Fluid And Coordinated: No  Coordination and Movement description: Left UE    Hand Dominance:  Hand Dominance  Hand Dominance: Right    ADL Status:  ADL  Feeding: Unable to assess(comment)  Grooming: Maximum assistance  UE Bathing: Maximum assistance  LE Bathing: Dependent/Total  UE Dressing: Dependent/Total  LE Dressing: Dependent/Total  Toileting: Unable to assess(comment)          Therapy key for assistance levels    Independent = Pt. is able to perform task with no assistance but may require a device   Stand by assistance = Pt. does not perform task at an independent level but does not need physical assistance, requires verbal cues  Minimal, Moderate, Maximal Assistance = Pt. requires physical assistance (25%, 50%, 75% assist from helper) for task but is able to actively participate in task   Dependent = Pt. requires total assistance with task and is not able to actively participate with task completion     Functional Mobility:     Transfers  Sit to stand: Moderate assistance  Stand to sit: Moderate assistance    Bed Mobility  Bed mobility  Supine to Sit: Maximum assistance  Sit to Supine: Maximum assistance    Seated and Standing Balance:  Balance  Sitting Balance: Minimal assistance  Standing Balance:  Moderate assistance(left posterior lean noted)    Functional Endurance:  Activity Tolerance  Activity Tolerance: Patient limited by fatigue    D/C Recommendations:  OT D/C RECOMMENDATIONS  REQUIRES OT FOLLOW UP: Yes    Equipment Recommendations:       OT Education:        OT Follow Up:  OT D/C RECOMMENDATIONS  REQUIRES OT FOLLOW UP: Yes       Assessment/Discharge Disposition:     Performance deficits / Impairments: Decreased functional mobility , Decreased balance, Decreased coordination, Decreased posture, Decreased ADL status, Decreased ROM, Decreased endurance, Decreased strength, Decreased sensation, Decreased fine motor control  Prognosis: Fair  Discharge Recommendations: Continue to assess pending progress  Decision Making: Medium Complexity  History: 4 complexities  Exam: 10 deficits  Assistance / Modification: Max A/D    Six Click Score    How much help for putting on and taking off regular lower body clothing?: Total  How much help for Bathing?: A Lot  How much help for Toileting?: Total  How much help for putting on and taking off regular upper body clothing?: A Lot  How much help for taking care of personal grooming?: A Lot  How much help for eating meals?: None  AM-PAC Inpatient Daily Activity Raw Score: 12  AM-PAC Inpatient ADL T-Scale Score : 30.6  ADL Inpatient CMS 0-100% Score: 66.57    Plan:  Plan  Times per week: 1-4x/wk  Current Treatment Recommendations: Strengthening, Endurance Training, Neuromuscular Re-education, Balance Training, ROM, Functional Mobility Training, Self-Care / ADL    Goals:   Patient will:    - Improve functional endurance to tolerate/complete 8-15 mins of ADL's  - Be Mod A in UB ADLs   - Be Mod A in LB ADLs  - Be Min A in ADL transfers without LOB    Patient Goal: Patient goals :  To return to home      Discussed and agreed upon: Yes Comments:     Therapy Time:   OT Individual Minutes  Time In: 1149  Time Out: 1205  Minutes: 16    Eval: 16 minutes     Electronically signed by:    LATASHA Baumann  9/7/1755, 1:16 PM Electronically signed by LATASHA Baumann on 5/5/47 at 1:16 PM EDT

## 2021-04-08 NOTE — PROGRESS NOTES
2115: Shift assessment complete, see flowsheet. A/Ox4 but confused at times. Left arm and left leg are flaccid. Hx of CVA and brain surgery. He denies pain at this time but states he is itching. Jasmin Galvez NP was notified via [employer]. Orders received. Pt is in bed, awake, needs provided.

## 2021-04-08 NOTE — PROGRESS NOTES
Infectious Disease     Patient Name: Kayla Escobar  Date: 4/8/2021  YOB: 1960  Medical Record Number: [id number]              History of Present Illness:  Vascular history hypertension hyperlipidemia   2/2021 CVA hemiparesis dysphagia  , cerebral edema, extradural subdural abscess with craniotomy  wears a helmet d/t hx of craniotomy d/t brain abscess  MSSA infection          Fatigue left flank pain pain on urination  PEG tube removed 10 days ago  Tender in the area with drainage  Fever 102 °F    Urinalysis [[id number]] (Abnormal) Collected: 04/05/21 1645      Specimen: Urine, clean catch Updated: 04/06/21 0132      Color, UA Yellow      Clarity, UA CLOUDY      Glucose, Ur Negative mg/dL       Bilirubin Urine Negative      Ketones, Urine Negative mg/dL       Specific Gravity, UA 1.011      Blood, Urine SMALL      pH, UA 5.5      Protein, UA TRACE mg/dL       Urobilinogen, Urine 1.0 E.U./dL       Nitrite, Urine Negative      Leukocyte Esterase, Urine LARGE     Microscopic Urinalysis [[id number]] (Abnormal) Collected: 04/05/21 1645       Updated: 04/06/21 0136      Bacteria, UA Negative /HPF       Hyaline Casts, UA 1-3 /HPF       WBC, UA >100 /HPF       RBC, UA 6-10 /HPF       Epithelial Cells, UA 0-2 /HPF                  Culture, Urine [[id number]] Collected: 04/05/21 1642   Order Status: Completed Specimen: Urine, clean catch Updated: 04/07/21 0724    Urine Culture, Routine --    <50,000 CFU/ml of mixed srinivas   Multiple organisms isolated, no predominance. Culture   indicates probable contamination. Please review colony count   and clinical indications to determine if a repeat culture is   necessary. No further workup to be done. Review of Systems   Constitutional: Negative. Negative for diaphoresis. Respiratory: Negative. Cardiovascular: Negative. Gastrointestinal: Negative.          Physical Exam   HENT:   Healed scars on head no redness warmth no drainage   Cardiovascular:   No murmur heard.  Pulmonary/Chest: Breath sounds normal. No respiratory distress. He has no wheezes. He has no rales. Abdominal: Bowel sounds are normal. He exhibits no distension and no mass. There is no abdominal tenderness. There is no rebound. PEG tube site shows no redness   Neurological:   Left-sided weakness       Blood pressure (!) 162/81, pulse 105, temperature 99 °F (37.2 °C), temperature source Oral, resp. rate 20, height 5' 5\" (1.651 m), weight 150 lb (68 kg), SpO2 100 %.       .   Lab Results   Component Value Date    WBC 4.6 (L) 04/08/2021    HGB 7.9 (L) 04/08/2021    HCT 25.1 (L) 04/08/2021    MCV 69.6 (L) 04/08/2021     04/08/2021     Lab Results   Component Value Date     04/08/2021    K 3.0 04/08/2021     04/08/2021    CO2 23 04/08/2021    BUN 5 04/08/2021    CREATININE 0.64 04/08/2021    GLUCOSE 94 04/08/2021    GLUCOSE 92 05/18/2012    CALCIUM 8.7 04/08/2021                PLAN:    UTI     urinalysis consistent with infection  Continue ceftriaxone   repeat urine culture PEND

## 2021-04-08 NOTE — PROGRESS NOTES
Physical Therapy Med Surg Initial Assessment  [facility]  [id number]       NAME: Mihaela Kelley  : 1960 (64 y.o.)  MRN: [id number]  CODE STATUS: Full Code    Date of Service: 2021    Patient Diagnosis(es): Acute cystitis with hematuria [N30.01]   Chief Complaint   Patient presents with    Flank Pain     Patient Active Problem List    Diagnosis Date Noted    Sepsis due to urinary tract infection ([address] 2021    Acute cystitis with hematuria 2021    Gastrostomy malfunction ([address] 2021    Cerebrovascular accident (CVA) due to embolism of precerebral artery ([address] 03/10/2021    Paralysis ([address] 03/10/2021    PEG (percutaneous endoscopic gastrostomy) status ([address] 03/10/2021    Essential hypertension 2021    Dysphagia due to recent cerebral infarction 2021    History of stroke 2021    History of substance abuse ([address] 2021    Left hemiparesis ([address] 2021    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 ([address] 2021    Status post craniectomy 2021    Osteoarthritis     Hyperlipidemia         Past Medical History:   Diagnosis Date    Hyperlipidemia     Hypertension     Osteoarthritis     S/P inguinal herniorrhaphy     left     Past Surgical History:   Procedure Laterality Date    HERNIA REPAIR Bilateral     VT COLON CA SCRN NOT HI RSK IND N/A 10/6/2017    COLONOSCOPY performed by Brian Whipple MD at [address] [place] [address]         Chart Reviewed: Yes  Patient assessed for rehabilitation services?: Yes  Family / Caregiver Present: No  General Comment  Comments: Pt laying in bed, agreeable to PT eval. Pt has helmet sitting EOB to be worn for OOB activity    Restrictions:  Restrictions/Precautions: Fall Risk(High fall risk per PT clinical judgement)     SUBJECTIVE: Subjective: Pt reports that he has been w/c user since medical event in december.     Pain  Pre Treatment Pain Screening  Pain at present: 0  Scale Used: Numeric Score    Post Treatment Pain Screening:   Pain Screening  Patient Currently in Pain: Denies  Pain Assessment  Pain Level: 0    Prior Level of Function:  Social/Functional History  Lives With: Other (comment)(sister)  Type of Home: Apartment  Home Layout: One level  Bathroom Shower/Tub: Tub/Shower unit  Home Equipment: [employer]  ADL Assistance: Needs assistance  Homemaking Responsibilities: No  Ambulation Assistance: Needs assistance(Pt reports that he uses w/c)  Transfer Assistance: Needs assistance    OBJECTIVE:   Vision: Impaired  Vision Exceptions: Wears glasses at all times  Hearing: Within functional limits    Cognition:  Overall Orientation Status: Impaired  Orientation Level: Oriented to person, Oriented to place, Disoriented to time  Follows Commands: Within Functional Limits    Observation/Palpation  Posture: Poor(Mod A with heavy lean that corrects with time, TC, VC to maintain midline)  Observation: pleasant, cooperative, no purposeful movement noted on L hemibody, helmet noted on beside table upon arrival    ROM:  LLE General PROM: hip flexor, hip ER and knee flexor contracture; lacking ~30 degrees hip ext to neutral, and knee lacking ~20 degrees extension, pt able to sit 90/90  LLE General AROM: limited by strength deficits    Strength:  Strength RLE  Comment: hip 3+/5, knee 4-/5, ankle 4-/5  Strength LLE  Comment: no purposeful movement noted, assessed in bed and sitting EOB  Strength Other  Other: impaired core strength noted during mobility assessment    Neuro:  Balance  Posture: Fair  Sitting - Static: Fair(Mod A progressing to CGA with tactile cues to encourage anterior wt shift and approximate B feet to floor)  Sitting - Dynamic: Fair  Standing - Static: Poor(Mod A)  Standing - Dynamic: Poor     Tone LLE  LLE Tone: Hypertonic;Clonus  Motor Control  Gross Motor?: Exceptions  Comments: L hemiplegia  Sensation  Overall Sensation Status: Impaired(L sided UE and LE deficits to light touch)    Bed mobility  Supine to Sit: Maximum assistance  Sit to Supine: Maximum assistance    Transfers  Sit to Stand: Moderate Assistance  Stand to sit: Moderate Assistance  Lateral Transfers: Moderate Assistance  Comment: face to face transfer    Ambulation  Ambulation?: No  Activity Tolerance  Activity Tolerance: Patient Tolerated treatment well      PT Education  PT Education: Goals;PT Role;Plan of Care    ASSESSMENT:   Body structures, Functions, Activity limitations: Decreased functional mobility ; Decreased balance;Decreased strength;Decreased ROM; Decreased posture  Decision Making: Medium Complexity  History: high  Exam: high  Clinical Presentation: med    Prognosis: Good  Barriers to Learning: cognitive deficits    DISCHARGE RECOMMENDATIONS:  Discharge Recommendations: Continue to assess pending progress    Assessment: Pt demonstrates the above deficits and decline in functional mobility status placing them at increased risk for falls. Pts current deficits are from previous medical event from December. Pt was living with sister who assisted with mobility/transfers prior to this admission. Pt would benefit from physical therapy to address above deficits and allow for safe return home at highest level of function, decrease risk for falls, and improve QOL.     REQUIRES PT FOLLOW UP: Yes      PLAN OF CARE:  Plan  Times per week: 3-6  Current Treatment Recommendations: Strengthening, Functional Mobility Training, Manual Therapy - Joint Manipulation, Home Exercise Program, Equipment Evaluation, Education, & procurement, Modalities, Safety Education & Training, Gait Training, Transfer Training, Balance Training, Stair training, Patient/Caregiver Education & Training, Positioning  Safety Devices  Type of devices: Left in bed, Call light within reach, Bed alarm in place    Goals:  Patient goals : \"go home\"  Long term goals  Long term goal 1: Bed mobility with Min A  Long term goal 2: Functional transfers/pivots with Min A  Long term goal 3: sitting EOB with SBA x 5 min with midline posture  Long term goal 4: standing with Min A x 1 min  Long term goal 5: SBA for ROM, strength and balance therex    AMPAC (6 CLICK) BASIC MOBILITY  AM-PAC Inpatient Mobility Raw Score : 11     Therapy Time:   Individual   Time In 8453   Time Out 1205   Minutes [address] [place], [place], 04/08/21 at 12:51 PM         Definitions for assistance levels  Independent = pt does not require any physical supervision or assistance from another person for activity completion. Device may be needed.   Stand by assistance = pt requires verbal cues or instructions from another person, close to but not touching, to perform the activity  Minimal assistance= pt performs 75% or more of the activity; assistance is required to complete the activity  Moderate assistance= pt performs 50% of the activity; assistance is required to complete the activity  Maximal assistance = pt performs 25% of the activity; assistance is required to complete the activity  Dependent = pt requires total physical assistance to accomplish the task

## 2021-04-09 LAB
ANION GAP SERPL CALCULATED.3IONS-SCNC: 10 MEQ/L (ref 9–15)
ANISOCYTOSIS: ABNORMAL
BASOPHILS ABSOLUTE: 0 K/UL (ref 0–0.2)
BASOPHILS RELATIVE PERCENT: 0.9 %
BUN BLDV-MCNC: 6 MG/DL (ref 8–23)
CALCIUM SERPL-MCNC: 9.3 MG/DL (ref 8.5–9.9)
CHLORIDE BLD-SCNC: 104 MEQ/L (ref 95–107)
CO2: 23 MEQ/L (ref 20–31)
CREAT SERPL-MCNC: 0.58 MG/DL (ref 0.7–1.2)
EOSINOPHILS ABSOLUTE: 0.2 K/UL (ref 0–0.7)
EOSINOPHILS RELATIVE PERCENT: 3.7 %
GFR AFRICAN AMERICAN: >60
GFR NON-AFRICAN AMERICAN: >60
GLUCOSE BLD-MCNC: 93 MG/DL (ref 70–99)
HCT VFR BLD CALC: 27.3 % (ref 42–52)
HEMOGLOBIN: 8.5 G/DL (ref 14–18)
HYPOCHROMIA: ABNORMAL
LYMPHOCYTES ABSOLUTE: 1.1 K/UL (ref 1–4.8)
LYMPHOCYTES RELATIVE PERCENT: 22.2 %
MCH RBC QN AUTO: 21.8 PG (ref 27–31.3)
MCHC RBC AUTO-ENTMCNC: 31.3 % (ref 33–37)
MCV RBC AUTO: 69.6 FL (ref 80–100)
MICROCYTES: ABNORMAL
MONOCYTES ABSOLUTE: 1 K/UL (ref 0.2–0.8)
MONOCYTES RELATIVE PERCENT: 18.5 %
NEUTROPHILS ABSOLUTE: 2.8 K/UL (ref 1.4–6.5)
NEUTROPHILS RELATIVE PERCENT: 54.7 %
PDW BLD-RTO: 17 % (ref 11.5–14.5)
PLATELET # BLD: 450 K/UL (ref 130–400)
PLATELET SLIDE REVIEW: ABNORMAL
POIKILOCYTES: ABNORMAL
POTASSIUM REFLEX MAGNESIUM: 4.2 MEQ/L (ref 3.4–4.9)
RBC # BLD: 3.92 M/UL (ref 4.7–6.1)
SLIDE REVIEW: ABNORMAL
SODIUM BLD-SCNC: 137 MEQ/L (ref 135–144)
TARGET CELLS: ABNORMAL
URINE CULTURE, ROUTINE: NORMAL
WBC # BLD: 5.1 K/UL (ref 4.8–10.8)

## 2021-04-09 PROCEDURE — 85025 COMPLETE CBC W/AUTO DIFF WBC: CPT

## 2021-04-09 PROCEDURE — 97140 MANUAL THERAPY 1/> REGIONS: CPT

## 2021-04-09 PROCEDURE — 1210000000 HC MED SURG R&B

## 2021-04-09 PROCEDURE — 80048 BASIC METABOLIC PNL TOTAL CA: CPT

## 2021-04-09 PROCEDURE — 99232 SBSQ HOSP IP/OBS MODERATE 35: CPT | Performed by: INTERNAL MEDICINE

## 2021-04-09 PROCEDURE — 6360000002 HC RX W HCPCS: Performed by: INTERNAL MEDICINE

## 2021-04-09 PROCEDURE — 6370000000 HC RX 637 (ALT 250 FOR IP): Performed by: INTERNAL MEDICINE

## 2021-04-09 PROCEDURE — 36415 COLL VENOUS BLD VENIPUNCTURE: CPT

## 2021-04-09 PROCEDURE — 2580000003 HC RX 258: Performed by: INTERNAL MEDICINE

## 2021-04-09 RX ORDER — HYDROCHLOROTHIAZIDE 25 MG/1
25 TABLET ORAL EVERY MORNING
Qty: 30 TABLET | Refills: 0 | Status: SHIPPED | OUTPATIENT
Start: 2021-04-09 | End: 2021-04-10 | Stop reason: SDUPTHER

## 2021-04-09 RX ADMIN — ATORVASTATIN CALCIUM 10 MG: 10 TABLET, FILM COATED ORAL at 10:23

## 2021-04-09 RX ADMIN — KETOROLAC TROMETHAMINE 15 MG: 15 INJECTION, SOLUTION INTRAMUSCULAR; INTRAVENOUS at 10:23

## 2021-04-09 RX ADMIN — ASPIRIN 81 MG: 81 TABLET, CHEWABLE ORAL at 10:23

## 2021-04-09 RX ADMIN — CEFTRIAXONE SODIUM 1000 MG: 1 INJECTION, POWDER, FOR SOLUTION INTRAMUSCULAR; INTRAVENOUS at 17:58

## 2021-04-09 RX ADMIN — SODIUM CHLORIDE, PRESERVATIVE FREE 10 ML: 5 INJECTION INTRAVENOUS at 10:24

## 2021-04-09 RX ADMIN — METOPROLOL TARTRATE 25 MG: 25 TABLET, FILM COATED ORAL at 10:23

## 2021-04-09 RX ADMIN — GABAPENTIN 100 MG: 100 CAPSULE ORAL at 15:03

## 2021-04-09 RX ADMIN — AMLODIPINE BESYLATE 10 MG: 10 TABLET ORAL at 10:24

## 2021-04-09 RX ADMIN — GABAPENTIN 100 MG: 100 CAPSULE ORAL at 21:47

## 2021-04-09 RX ADMIN — ENOXAPARIN SODIUM 40 MG: 40 INJECTION SUBCUTANEOUS at 10:24

## 2021-04-09 RX ADMIN — METOPROLOL TARTRATE 25 MG: 25 TABLET, FILM COATED ORAL at 21:47

## 2021-04-09 RX ADMIN — GABAPENTIN 100 MG: 100 CAPSULE ORAL at 10:23

## 2021-04-09 RX ADMIN — SODIUM CHLORIDE, PRESERVATIVE FREE 10 ML: 5 INJECTION INTRAVENOUS at 21:47

## 2021-04-09 ASSESSMENT — PAIN DESCRIPTION - FREQUENCY: FREQUENCY: CONTINUOUS

## 2021-04-09 ASSESSMENT — ENCOUNTER SYMPTOMS
GASTROINTESTINAL NEGATIVE: 1
RESPIRATORY NEGATIVE: 1

## 2021-04-09 ASSESSMENT — PAIN DESCRIPTION - LOCATION: LOCATION: LEG;NECK

## 2021-04-09 ASSESSMENT — PAIN SCALES - GENERAL: PAINLEVEL_OUTOF10: 5

## 2021-04-09 ASSESSMENT — PAIN DESCRIPTION - DESCRIPTORS: DESCRIPTORS: ACHING

## 2021-04-09 NOTE — PROGRESS NOTES
in Pain: Yes       Post-Session Pain Report  Pain Assessment  Pain Assessment: Faces  Pain Level: 5  Pain Location: Leg;Neck  Pain Orientation: Left;Right(left leg, right neck pain.)  Pain Descriptors: Aching  Pain Frequency: Continuous         OBJECTIVE    no mobility activity practiced due to pt refusal. Pt states he utilizes a wc at home since December. Pt declined to practice transfer up to a chair. Manual therapy  PROM: left leg to help with tightness and pain. right leg for therex. both legs moved through safe ranges of motion pt able to tolerate. pt tended to hookly with right leg. Soft Tissue Mobalization: stm to left lateral hip due to c/o pain. ASSESSMENT pt declined mobility tasks. Alerted LSSUZETTE Chiang that pt would not get out of bed for tx. I feel pt would benefit from additional therapy and 24 hour care at [place]. Pt talked about his children and grandchildren and that they were \"brats\" but he loved them. Pt told me to get his glasses that were by a ginger ale can on sink. There were no glasses there or anywhere in the room. Discharge Recommendations:  Continue to assess pending progress    Goals  Long term goals  Long term goal 1: Bed mobility with Min A  Long term goal 2: Functional transfers/pivots with Min A  Long term goal 3: sitting EOB with SBA x 5 min with midline posture  Long term goal 4: standing with Min A x 1 min  Long term goal 5: SBA for ROM, strength and balance therex  Patient Goals   Patient goals : \"go home\"    PLAN    Times per week: 3-6        AMPA (6 CLICK) BASIC MOBILITY    11    Therapy Time   Individual   Time In  1140   Time Out 1155   Minutes 15   15 minutes manual activity to lower extremities. Dixon Lyons PTA, 04/09/21 at 11:55 AM         Definitions for assistance levels  Independent = pt does not require any physical supervision or assistance from another person for activity completion. Device may be needed.   Stand by assistance = pt requires verbal cues or instructions from another person, close to but not touching, to perform the activity  Minimal assistance= pt performs 75% or more of the activity; assistance is required to complete the activity  Moderate assistance= pt performs 50% of the activity; assistance is required to complete the activity  Maximal assistance = pt performs 25% of the activity; assistance is required to complete the activity  Dependent = pt requires total physical assistance to accomplish the task

## 2021-04-09 NOTE — TELEPHONE ENCOUNTER
Spoke with Yuko Jo she said the message I told her from Sneha was all she needed and she will go ahead and note this. Marty Weinberg

## 2021-04-09 NOTE — DISCHARGE SUMMARY
[facility] Medicine Discharge Summary    Dario Donohue  :  1960  MRN:  [id number]    Admit date:  2021    Discharge date:  4/10/2021    Admitting Physician: Terry Jeffrey MD  Primary Care Physician:  Gabriel Hutton PA-C    Discharge Diagnoses: Active Problems:    Acute cystitis with hematuria    Sepsis due to urinary tract infection ([address]  Resolved Problems:    * No resolved hospital problems. *    Chief Complaint   Patient presents with    Flank Pain       Condition: improved  Activity: no restrictions   Diet: regular  Disposition: home with existing home care  Functional Status: wheelchair bound    Significant Findings:     Recent Labs     21  0504 21  0504 21  0455   WBC 5.1 4.6* 7.2   HGB 8.5* 7.9* 8.3*   HCT 27.3* 25.1* 26.4*   MCV 69.6* 69.6* 70.2*   * 399 371     Initial leukocytosis 20    Hospital Course:   75-year-old male with prior CVA/hemorrhagic conversion s/p craniectomy with left-sided weakness, hypertension presented with lower abdominal pain and dysuria. He was found to have sepsis secondary to urinary tract infection however his urine culture was contaminated. He improved significantly with IV ceftriaxone. ID was consulted to help determine if prostatitis was present-they felt this was solely UTI. He was transitioned to oral antibiotic at discharge to complete treatment of complicated UTI. At the time of discharge, he had fully returned to his baseline. Exam on Discharge:   BP (!) 144/74   Pulse 74   Temp 97.9 °F (36.6 °C) (Oral)   Resp 18   Ht 5' 5\" (1.651 m)   Wt 150 lb (68 kg)   SpO2 100%   BMI 24.96 kg/m²   General appearance: Chronically ill-appearing. Left-sided weakness.   Will follow commands and is conversational.  Lungs: clear to auscultation bilaterally, normal effort  Heart: regular rate and rhythm, no murmur  Abdomen: soft, nontender, nondistended, bowel sounds present, no masses  Extremities: no edema,

## 2021-04-09 NOTE — PROGRESS NOTES
Infectious Disease     Patient Name: Robina Sahu  Date: 4/9/2021  YOB: 1960  Medical Record Number: [id number]          UTI          Fatigue left flank pain pain on urination  PEG tube removed 10 days ago  Tender in the area with drainage  Fever 102 °F    Urinalysis [[id number]] (Abnormal) Collected: 04/05/21 1645      Specimen: Urine, clean catch Updated: 04/06/21 0132      Color, UA Yellow      Clarity, UA CLOUDY      Glucose, Ur Negative mg/dL       Bilirubin Urine Negative      Ketones, Urine Negative mg/dL       Specific Gravity, UA 1.011      Blood, Urine SMALL      pH, UA 5.5      Protein, UA TRACE mg/dL       Urobilinogen, Urine 1.0 E.U./dL       Nitrite, Urine Negative      Leukocyte Esterase, Urine LARGE     Microscopic Urinalysis [[id number]] (Abnormal) Collected: 04/05/21 1645       Updated: 04/06/21 0136      Bacteria, UA Negative /HPF       Hyaline Casts, UA 1-3 /HPF       WBC, UA >100 /HPF       RBC, UA 6-10 /HPF       Epithelial Cells, UA 0-2 /HPF                  Culture, Urine [[id number]] Collected: 04/05/21 1642   Order Status: Completed Specimen: Urine, clean catch Updated: 04/07/21 0724    Urine Culture, Routine --    <50,000 CFU/ml of mixed srinivas   Multiple organisms isolated, no predominance. Culture   indicates probable contamination. Please review colony count   and clinical indications to determine if a repeat culture is   necessary. No further workup to be done. Review of Systems   Respiratory: Negative. Cardiovascular: Negative. Gastrointestinal: Negative. Physical Exam   HENT:   Healed scars on head no redness warmth no drainage   Cardiovascular:   No murmur heard. Pulmonary/Chest: Breath sounds normal. No respiratory distress. He has no wheezes. He has no rales. Abdominal: Bowel sounds are normal. He exhibits no distension and no mass. There is no abdominal tenderness. There is no rebound.    PEG tube site shows no redness   Neurological:

## 2021-04-10 VITALS
HEART RATE: 73 BPM | TEMPERATURE: 97.7 F | HEIGHT: 65 IN | DIASTOLIC BLOOD PRESSURE: 82 MMHG | RESPIRATION RATE: 18 BRPM | OXYGEN SATURATION: 99 % | SYSTOLIC BLOOD PRESSURE: 130 MMHG | BODY MASS INDEX: 24.99 KG/M2 | WEIGHT: 150 LBS

## 2021-04-10 LAB
ANION GAP SERPL CALCULATED.3IONS-SCNC: 9 MEQ/L (ref 9–15)
BASOPHILS ABSOLUTE: 0 K/UL (ref 0–0.2)
BASOPHILS RELATIVE PERCENT: 1.1 %
BLOOD CULTURE, ROUTINE: NORMAL
BUN BLDV-MCNC: 8 MG/DL (ref 8–23)
CALCIUM SERPL-MCNC: 9.4 MG/DL (ref 8.5–9.9)
CHLORIDE BLD-SCNC: 105 MEQ/L (ref 95–107)
CO2: 25 MEQ/L (ref 20–31)
CREAT SERPL-MCNC: 0.64 MG/DL (ref 0.7–1.2)
CULTURE, BLOOD 2: NORMAL
EOSINOPHILS ABSOLUTE: 0.2 K/UL (ref 0–0.7)
EOSINOPHILS RELATIVE PERCENT: 5 %
GFR AFRICAN AMERICAN: >60
GFR NON-AFRICAN AMERICAN: >60
GLUCOSE BLD-MCNC: 95 MG/DL (ref 70–99)
HCT VFR BLD CALC: 26.8 % (ref 42–52)
HEMOGLOBIN: 8.3 G/DL (ref 14–18)
LYMPHOCYTES ABSOLUTE: 1.4 K/UL (ref 1–4.8)
LYMPHOCYTES RELATIVE PERCENT: 30.7 %
MCH RBC QN AUTO: 21.8 PG (ref 27–31.3)
MCHC RBC AUTO-ENTMCNC: 31 % (ref 33–37)
MCV RBC AUTO: 70.5 FL (ref 80–100)
MONOCYTES ABSOLUTE: 0.6 K/UL (ref 0.2–0.8)
MONOCYTES RELATIVE PERCENT: 13.1 %
NEUTROPHILS ABSOLUTE: 2.3 K/UL (ref 1.4–6.5)
NEUTROPHILS RELATIVE PERCENT: 50.1 %
PDW BLD-RTO: 17.1 % (ref 11.5–14.5)
PLATELET # BLD: 462 K/UL (ref 130–400)
POTASSIUM REFLEX MAGNESIUM: 4 MEQ/L (ref 3.4–4.9)
RBC # BLD: 3.8 M/UL (ref 4.7–6.1)
SODIUM BLD-SCNC: 139 MEQ/L (ref 135–144)
WBC # BLD: 4.6 K/UL (ref 4.8–10.8)

## 2021-04-10 PROCEDURE — 6360000002 HC RX W HCPCS: Performed by: INTERNAL MEDICINE

## 2021-04-10 PROCEDURE — 36415 COLL VENOUS BLD VENIPUNCTURE: CPT

## 2021-04-10 PROCEDURE — 80048 BASIC METABOLIC PNL TOTAL CA: CPT

## 2021-04-10 PROCEDURE — 2580000003 HC RX 258: Performed by: INTERNAL MEDICINE

## 2021-04-10 PROCEDURE — 6370000000 HC RX 637 (ALT 250 FOR IP): Performed by: INTERNAL MEDICINE

## 2021-04-10 PROCEDURE — 85025 COMPLETE CBC W/AUTO DIFF WBC: CPT

## 2021-04-10 RX ORDER — HYDROCHLOROTHIAZIDE 25 MG/1
25 TABLET ORAL EVERY MORNING
Qty: 30 TABLET | Refills: 0 | Status: SHIPPED | OUTPATIENT
Start: 2021-04-10

## 2021-04-10 RX ORDER — SULFAMETHOXAZOLE AND TRIMETHOPRIM 800; 160 MG/1; MG/1
1 TABLET ORAL 2 TIMES DAILY
Qty: 6 TABLET | Refills: 0 | Status: SHIPPED | OUTPATIENT
Start: 2021-04-11 | End: 2021-04-10 | Stop reason: SDUPTHER

## 2021-04-10 RX ORDER — SULFAMETHOXAZOLE AND TRIMETHOPRIM 800; 160 MG/1; MG/1
1 TABLET ORAL 2 TIMES DAILY
Qty: 6 TABLET | Refills: 0 | Status: SHIPPED | OUTPATIENT
Start: 2021-04-11 | End: 2021-04-14

## 2021-04-10 RX ADMIN — SODIUM CHLORIDE, PRESERVATIVE FREE 10 ML: 5 INJECTION INTRAVENOUS at 09:38

## 2021-04-10 RX ADMIN — GABAPENTIN 100 MG: 100 CAPSULE ORAL at 09:00

## 2021-04-10 RX ADMIN — METOPROLOL TARTRATE 25 MG: 25 TABLET, FILM COATED ORAL at 08:59

## 2021-04-10 RX ADMIN — GABAPENTIN 100 MG: 100 CAPSULE ORAL at 14:14

## 2021-04-10 RX ADMIN — ASPIRIN 81 MG: 81 TABLET, CHEWABLE ORAL at 09:00

## 2021-04-10 RX ADMIN — HYPROMELLOSE 2910 1 DROP: 5 SOLUTION OPHTHALMIC at 15:38

## 2021-04-10 RX ADMIN — ATORVASTATIN CALCIUM 10 MG: 10 TABLET, FILM COATED ORAL at 09:00

## 2021-04-10 RX ADMIN — ENOXAPARIN SODIUM 40 MG: 40 INJECTION SUBCUTANEOUS at 09:00

## 2021-04-10 RX ADMIN — AMLODIPINE BESYLATE 10 MG: 10 TABLET ORAL at 08:59

## 2021-04-10 RX ADMIN — CEFTRIAXONE SODIUM 1000 MG: 1 INJECTION, POWDER, FOR SOLUTION INTRAMUSCULAR; INTRAVENOUS at 15:37

## 2021-04-10 ASSESSMENT — PAIN SCALES - GENERAL: PAINLEVEL_OUTOF10: 0

## 2021-04-10 NOTE — PROGRESS NOTES
Patient is alert times two. He is incontinent of urine and the condom catheter is not staying on. Patient was cleaned and repositioned in the bed. Patient takes his medications one at a time and is drinking water. Patient declined a snack. Call light with patient and bed alarm on. He does complain of discomfort to his left hip when turning.

## 2021-04-10 NOTE — PROGRESS NOTES
mg  4 mg Intravenous Q6H PRN Bonney Severs, MD        polyethylene glycol (GLYCOLAX) packet 17 g  17 g Oral Daily PRN Bonney Severs, MD        acetaminophen (TYLENOL) tablet 650 mg  650 mg Oral Q6H PRN Bonney Severs, MD        Or   [facility] acetaminophen (TYLENOL) suppository 650 mg  650 mg Rectal Q6H PRN Bonney Severs, MD           Physical Examination:      Vitals:  /82   Pulse 73   Temp 97.7 °F (36.5 °C) (Oral)   Resp 18   Ht 5' 5\" (1.651 m)   Wt 150 lb (68 kg)   SpO2 99%   BMI 24.96 kg/m²   Temp (24hrs), Av.2 °F (36.8 °C), Min:97.7 °F (36.5 °C), Max:98.6 °F (37 °C)      General appearance: Chronically ill-appearing. Left-sided weakness. Will follow commands and is conversational.  Lungs: clear to auscultation bilaterally, normal effort  Heart: regular rate and rhythm, no murmur  Abdomen: soft, nontender, nondistended, bowel sounds present, no masses  Extremities: no edema, redness, tenderness in the calves. Cap refill <2s  Skin: no gross lesions, rashes  Rectal exam: Patient yelled out every time his prostate was palpated. No obvious edema or bogginess appreciated. Data:     Labs:  Recent Labs     21  0504 04/10/21  0506   WBC 5.1 4.6*   HGB 8.5* 8.3*   * 462*     Recent Labs     21  0504 04/10/21  0506    139   K 4.2 4.0    105   CO2 23 25   BUN 6* 8   CREATININE 0.58* 0.64*   GLUCOSE 93 95     No results for input(s): AST, ALT, ALB, BILITOT, ALKPHOS in the last 72 hours. Assessment and Plan:        1. Sepsis suspect secondary to UTI. Urine culture contaminated. Repeat urine culture after patient on antibiotics with no growth to date. -ID consulted to help determine if prostatitis is present-they feel this is UTI. -Today is day 6 of ceftriaxone and the patient has not had true fever for several days. We will transition to oral antibiotic and discharged today.     History of CVA/hemorrhagic conversion/s/p craniectomy with left-sided weakness/spasticity  History of PEG tube which is been removed  Hypertension    Diet: DIET GENERAL;  Ppx: Lovenox  Full Code    Dispo: Inpatient.   I will update discharge summary from 4/9.      >35 minutes in total care time    Electronically signed by Vic Hu DO on 4/10/2021 at 2:43 PM

## 2021-04-10 NOTE — DISCHARGE INSTR - COC
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 ([facility] I63.9, F06.30    Status post craniectomy Z98.890    Gastrostomy malfunction ([facility] K94.23    Acute cystitis with hematuria N30.01    Sepsis due to urinary tract infection ([facility]) A41.9, N39.0       Isolation/Infection:   Isolation          No Isolation        Patient Infection Status     None to display          Nurse Assessment:  Last Vital Signs: /82   Pulse 73   Temp 97.7 °F (36.5 °C) (Oral)   Resp 18   Ht 5' 5\" (1.651 m)   Wt 150 lb (68 kg)   SpO2 99%   BMI 24.96 kg/m²     Last documented pain score (0-10 scale): Pain Level: 0  Last Weight:   Wt Readings from Last 1 Encounters:   04/05/21 150 lb (68 kg)     Mental Status:  oriented and able to concentrate and follow conversation    IV Access:  - None    Nursing Mobility/ADLs:  Walking   Dependent  Transfer  Assisted  Bathing  Assisted  Dressing  Assisted  Toileting  Assisted  Feeding  Assisted  Med Admin  Assisted  Med Delivery   whole    Wound Care Documentation and Therapy:        Elimination:  Continence:   · Bowel: {YES / TO:03453}  · Bladder: {YES / XP:04310}  Urinary Catheter: None   Colostomy/Ileostomy/Ileal Conduit: {YES / BR:55446}       Date of Last BM: ***    Intake/Output Summary (Last 24 hours) at 4/10/2021 1504  Last data filed at 4/10/2021 0600  Gross per 24 hour   Intake 610 ml   Output 500 ml   Net 110 ml     I/O last 3 completed shifts: In: 56 [P.O.:600; I.V.:10]  Out: 500 [Urine:500]    Safety Concerns:      At Risk for Falls    Impairments/Disabilities:      Paralysis - Left side flaccid as a result of stroke 12/2020    Nutrition Therapy:  Current Nutrition Therapy:   - Oral Diet:  General    Routes of Feeding: Oral  Liquids: {Slp liquid thickness:08315}  Daily Fluid Restriction: no  Last Modified Barium Swallow with Video (Video Swallowing Test): not done    Treatments at the Time of Hospital Discharge:   Respiratory Treatments: ***  Oxygen Therapy:  is not on home oxygen therapy. Ventilator:    - No ventilator support    Rehab Therapies: {THERAPEUTIC INTERVENTION:8495194519}  Weight Bearing Status/Restrictions: Non-weight bearing on left leg  Other Medical Equipment (for information only, NOT a DME order):  {EQUIPMENT:765624426}  Other Treatments: ***    Patient's personal belongings (please select all that are sent with patient):  Amber    RN SIGNATURE:  Electronically signed by Estela Whitten RN on 4/10/21 at 4:15 PM EDT    CASE MANAGEMENT/SOCIAL WORK SECTION    Inpatient Status Date: ***    Readmission Risk Assessment Score:  Readmission Risk              Risk of Unplanned Readmission:        8           Discharging to Facility/ Agency   · Name:   · Address:  · Phone:  · Fax:    Dialysis Facility (if applicable)   · Name:  · Address:  · Dialysis Schedule:  · Phone:  · Fax:    / signature:   PHYSICIAN SECTION    Prognosis: Fair    Condition at Discharge: Stable    Rehab Potential (if transferring to Rehab): Good    Recommended Labs or Other Treatments After Discharge: ***    Physician Certification: I certify the above information and transfer of Robina Sahu  is necessary for the continuing treatment of the diagnosis listed and that he requires [address] for {GREATER/LESS:232280383} 30 days.      Update Admission H&P: No change in H&P    PHYSICIAN SIGNATURE:

## 2021-04-10 NOTE — PROGRESS NOTES
Discharged patient as ordered by Dr. Scott Goddard, provide patient and family with discharge instructions, provided education relate to diet, exercise, all medications and answered all questions.

## 2021-04-11 NOTE — PROGRESS NOTES
Physical Therapy  Facility/Department: [facility] I465/[id number]  Physical Therapy Discharge      NAME: Ekaterina Rodriguez    : 1960 (64 y.o.)  MRN: [id number]    Account: [de-identified]  Gender: male      Patient has been discharged from acute care hospital. DC patient from current PT program.      Electronically signed by Rick Somers PT on 21 at 4:24 PM EDT

## 2021-04-12 ENCOUNTER — CARE COORDINATION (OUTPATIENT)
Dept: CASE MANAGEMENT | Age: 61
End: 2021-04-12

## 2021-04-12 NOTE — CARE COORDINATION
[facility] Transitions Initial Follow Up Call    Call within 2 business days of discharge: Yes    Patient: Yajaira Chiang Patient : 1960   MRN: [id number]  Reason for Admission: -4/10/2021 ED [facility] UTI Sepsis  Discharge Date: 4/10/21 RARS: Readmission Risk Score: 8  Care Transitions    Last Discharge [facility]       Complaint Diagnosis Description Type Department Provider    21 Flank Pain Acute cystitis with hematuria ED to Hosp-Admission (Discharged) (ADMITTED) [address], DO; Brigette Solano. .. Initial CT Outreach. Left Hippa compliant VM.     Care Transitions 24 Hour Call    Care Transitions Interventions         Follow Up  Future Appointments   Date Time Provider Elidia Armas   2021  2:00 PM [facility]   2021  4:00 PM Megan Stewart MD [id number] [facility],[address]Nd Floor,[address] Floor, [address] [place] [address]

## 2021-04-13 ENCOUNTER — CARE COORDINATION (OUTPATIENT)
Dept: CASE MANAGEMENT | Age: 61
End: 2021-04-13

## 2021-04-13 NOTE — CARE COORDINATION
[address] Transitions Initial Follow Up Call    Call within 2 business days of discharge: Yes    Patient: Ekaterina Rodriguez Patient : 1960   MRN: [id number]  Reason for Admission: -4/10/2021 [address] UTI Sepsis  Discharge Date: 4/10/21 RARS: Readmission Risk Score: 8  Care Transitions    Last Discharge [facility]       Complaint Diagnosis Description Type Department Provider    21 Flank Pain Acute cystitis with hematuria ED to Hosp-Admission (Discharged) (ADMITTED) [address], DO; Patti Cockayne. .. Second initial CT outreach. Left Hippa complaint VM. CTN s/o.     Care Transitions 24 Hour Call    Care Transitions Interventions         Follow Up  Future Appointments   Date Time Provider Elidia Armas   2021  2:00 PM WILLY Bella   2021  4:00 PM Ty Martin MD [address] [place] [address],[address], [address] [place] [address]

## 2021-04-21 ENCOUNTER — TELEPHONE (OUTPATIENT)
Dept: FAMILY MEDICINE CLINIC | Age: 61
End: 2021-04-21

## 2021-04-23 PROBLEM — I10 UNCONTROLLED HYPERTENSION: Status: ACTIVE | Noted: 2020-02-19

## 2021-04-23 PROBLEM — F17.200 NICOTINE USE DISORDER: Status: ACTIVE | Noted: 2020-02-19

## 2021-04-23 PROBLEM — R06.2 WHEEZING: Status: ACTIVE | Noted: 2020-02-19

## 2021-04-23 PROBLEM — M54.6 ACUTE LEFT-SIDED THORACIC BACK PAIN: Status: ACTIVE | Noted: 2020-02-20

## 2021-04-23 PROBLEM — R07.89 CHEST PAIN, ATYPICAL: Status: ACTIVE | Noted: 2020-02-19

## 2021-04-26 ENCOUNTER — TELEPHONE (OUTPATIENT)
Dept: FAMILY MEDICINE CLINIC | Age: 61
End: 2021-04-26

## 2021-04-26 ENCOUNTER — OFFICE VISIT (OUTPATIENT)
Dept: FAMILY MEDICINE CLINIC | Age: 61
End: 2021-04-26
Payer: COMMERCIAL

## 2021-04-26 VITALS
OXYGEN SATURATION: 99 % | BODY MASS INDEX: 24.21 KG/M2 | TEMPERATURE: 97.5 F | SYSTOLIC BLOOD PRESSURE: 138 MMHG | DIASTOLIC BLOOD PRESSURE: 80 MMHG | RESPIRATION RATE: 16 BRPM | HEART RATE: 79 BPM | HEIGHT: 66 IN

## 2021-04-26 DIAGNOSIS — G81.94 LEFT HEMIPARESIS (HCC): Primary | ICD-10-CM

## 2021-04-26 DIAGNOSIS — Z86.73 HISTORY OF STROKE: ICD-10-CM

## 2021-04-26 DIAGNOSIS — E78.2 MIXED HYPERLIPIDEMIA: ICD-10-CM

## 2021-04-26 DIAGNOSIS — I10 ESSENTIAL HYPERTENSION: ICD-10-CM

## 2021-04-26 DIAGNOSIS — D50.9 IRON DEFICIENCY ANEMIA, UNSPECIFIED IRON DEFICIENCY ANEMIA TYPE: ICD-10-CM

## 2021-04-26 DIAGNOSIS — Z00.00 HEALTHCARE MAINTENANCE: ICD-10-CM

## 2021-04-26 DIAGNOSIS — Z12.5 PROSTATE CANCER SCREENING: ICD-10-CM

## 2021-04-26 LAB
ACANTHOCYTES: ABNORMAL
ANION GAP SERPL CALCULATED.3IONS-SCNC: 15 MEQ/L (ref 9–15)
ANISOCYTOSIS: ABNORMAL
BASOPHILS ABSOLUTE: 0.1 K/UL (ref 0–0.2)
BASOPHILS RELATIVE PERCENT: 1.2 %
BUN BLDV-MCNC: 13 MG/DL (ref 8–23)
CALCIUM SERPL-MCNC: 10.1 MG/DL (ref 8.5–9.9)
CHLORIDE BLD-SCNC: 96 MEQ/L (ref 95–107)
CHOLESTEROL, FASTING: 245 MG/DL (ref 0–199)
CO2: 25 MEQ/L (ref 20–31)
CREAT SERPL-MCNC: 0.79 MG/DL (ref 0.7–1.2)
EOSINOPHILS ABSOLUTE: 0.1 K/UL (ref 0–0.7)
EOSINOPHILS RELATIVE PERCENT: 2.2 %
FOLATE: 15.4 NG/ML (ref 7.3–26.1)
GFR AFRICAN AMERICAN: >60
GFR NON-AFRICAN AMERICAN: >60
GLUCOSE BLD-MCNC: 74 MG/DL (ref 70–99)
HBA1C MFR BLD: 5.7 % (ref 4.8–5.9)
HBA1C MFR BLD: 5.9 %
HCT VFR BLD CALC: 34.3 % (ref 42–52)
HDLC SERPL-MCNC: 41 MG/DL (ref 40–59)
HEMOGLOBIN: 10.5 G/DL (ref 14–18)
HYPOCHROMIA: ABNORMAL
IRON SATURATION: 6 % (ref 11–46)
IRON: 30 UG/DL (ref 59–158)
LDL CHOLESTEROL CALCULATED: 185 MG/DL (ref 0–129)
LYMPHOCYTES ABSOLUTE: 1.4 K/UL (ref 1–4.8)
LYMPHOCYTES RELATIVE PERCENT: 29.7 %
MCH RBC QN AUTO: 21.4 PG (ref 27–31.3)
MCHC RBC AUTO-ENTMCNC: 30.6 % (ref 33–37)
MCV RBC AUTO: 69.9 FL (ref 80–100)
MICROCYTES: ABNORMAL
MONOCYTES ABSOLUTE: 0.2 K/UL (ref 0.2–0.8)
MONOCYTES RELATIVE PERCENT: 4.9 %
NEUTROPHILS ABSOLUTE: 3 K/UL (ref 1.4–6.5)
NEUTROPHILS RELATIVE PERCENT: 62 %
OVALOCYTES: ABNORMAL
PDW BLD-RTO: 17.3 % (ref 11.5–14.5)
PLATELET # BLD: 586 K/UL (ref 130–400)
PLATELET SLIDE REVIEW: ABNORMAL
POIKILOCYTES: ABNORMAL
POTASSIUM SERPL-SCNC: 3.7 MEQ/L (ref 3.4–4.9)
RBC # BLD: 4.9 M/UL (ref 4.7–6.1)
SLIDE REVIEW: ABNORMAL
SODIUM BLD-SCNC: 136 MEQ/L (ref 135–144)
TARGET CELLS: ABNORMAL
TOTAL IRON BINDING CAPACITY: 474 UG/DL (ref 178–450)
TRIGLYCERIDE, FASTING: 97 MG/DL (ref 0–150)
VITAMIN B-12: 586 PG/ML (ref 232–1245)
WBC # BLD: 4.8 K/UL (ref 4.8–10.8)

## 2021-04-26 PROCEDURE — 83036 HEMOGLOBIN GLYCOSYLATED A1C: CPT | Performed by: PHYSICIAN ASSISTANT

## 2021-04-26 PROCEDURE — G8427 DOCREV CUR MEDS BY ELIG CLIN: HCPCS | Performed by: PHYSICIAN ASSISTANT

## 2021-04-26 PROCEDURE — 1036F TOBACCO NON-USER: CPT | Performed by: PHYSICIAN ASSISTANT

## 2021-04-26 PROCEDURE — G8420 CALC BMI NORM PARAMETERS: HCPCS | Performed by: PHYSICIAN ASSISTANT

## 2021-04-26 PROCEDURE — 3017F COLORECTAL CA SCREEN DOC REV: CPT | Performed by: PHYSICIAN ASSISTANT

## 2021-04-26 PROCEDURE — 1111F DSCHRG MED/CURRENT MED MERGE: CPT | Performed by: PHYSICIAN ASSISTANT

## 2021-04-26 PROCEDURE — 99204 OFFICE O/P NEW MOD 45 MIN: CPT | Performed by: PHYSICIAN ASSISTANT

## 2021-04-26 RX ORDER — ATORVASTATIN CALCIUM 10 MG/1
TABLET, FILM COATED ORAL
Qty: 90 TABLET | Refills: 2 | Status: SHIPPED | OUTPATIENT
Start: 2021-04-26 | End: 2021-07-27 | Stop reason: DRUGHIGH

## 2021-04-26 NOTE — PROGRESS NOTES
Subjective  Jimmy Chan, 64 y.o. male presents today with:  Chief Complaint   Patient presents with   [facility] Establish Care     Pt. is here today to establish care, previous pcp was Dr. Clementine Leon and [facility]. HX of stroke,  12/20/20 left side effected. He states he is doing good. Previous UTI x 2 week seen in [facility] completed treatment and doing well states family. HPI  Patient is here along with his sister and brother who provide his personal care and act as his POA past  has had a complicated past medical history sustaining a hemorrhagic stroke in December 2020 requiring right jessica-craniotomy resulting in left-sided hemiparesis  UH. H.o of htn  Rehab was complicated by infection at craniotomy site  Patient sister states he has been making excellent progress as he now has some mobility -he is able to self navigate his wheelchair as well as partially dress himself no longer receiving speech pathology continue  OT /PT 3 times per week in the home  Patient needs a better fitting helmet size 20.5 inches (53 cm wheelchair with resting leg splint 16 Rollator walker with left arm rest  Review of Systems   HENT: Positive for sneezing. Musculoskeletal: Positive for arthralgias. Left shoulder   All other systems reviewed and are negative.         Past Medical History:   Diagnosis Date    Hyperlipidemia     Hypertension     Osteoarthritis     S/P inguinal herniorrhaphy     left    Stroke ([facility] [address] 12/20/2020     Past Surgical History:   Procedure Laterality Date    HERNIA REPAIR Bilateral     [place] COLON CA SCRN NOT HI RSK IND N/A 10/6/2017    COLONOSCOPY performed by Makayla Whitten MD at [address] [place] [address]       Social History     Socioeconomic History    Marital status:      Spouse name: Not on file    Number of children: Not on file    Years of education: Not on file    Highest education level: Not on file   Occupational History    Not on file   Social Needs    Financial resource strain: Not very hard    Food insecurity     Worry: Not on file     Inability: Not on file   [employer] needs     Medical: Not on file     Non-medical: Not on file   Tobacco Use    Smoking status: Former Smoker     Packs/day: 1.00     Years: 20.00     Pack years: 20.00     Types: Cigarettes     Start date: 0     Quit date: 2020     Years since quittin.3    Smokeless tobacco: Never Used   Substance and Sexual Activity    Alcohol use: Yes     Alcohol/week: 18.0 standard drinks     Types: 18 Cans of beer per week    Drug use: Yes     Types: Marijuana     Comment: 2X/ month    Sexual activity: Not on file     Comment:    Lifestyle    Physical activity     Days per week: Not on file     Minutes per session: Not on file    Stress: Not on file   Relationships    Social connections     Talks on phone: Not on file     Gets together: Not on file     Attends Voodoo service: Not on file     Active member of club or organization: Not on file     Attends meetings of clubs or organizations: Not on file     Relationship status: Not on file    Intimate partner violence     Fear of current or ex partner: Not on file     Emotionally abused: Not on file     Physically abused: Not on file     Forced sexual activity: Not on file   Other Topics Concern    Not on file   Social History Narrative    Not on file     No family history on file. No Known Allergies  Current Outpatient Medications   Medication Sig Dispense Refill    atorvastatin (LIPITOR) 10 MG tablet take 1 tablet by mouth once daily 90 tablet 2    metoprolol tartrate (LOPRESSOR) 25 MG tablet Take 2 tablets by mouth daily 90 tablet 2    hydroCHLOROthiazide (HYDRODIURIL) 25 MG tablet Take 1 tablet by mouth every morning 30 tablet 0    metoprolol tartrate (LOPRESSOR) 25 MG tablet GIVE 1 TABLET VIA PEG TUBE IN THE MORNING AND AT BEDTIME 180 tablet 4    amLODIPine (NORVASC) 10 MG tablet 1 tablet via PEG daily.  [address] tablet 4    aspirin 81 MG chewable tablet Take 81 mg by mouth daily      melatonin 1 MG tablet Take 10 mg by mouth nightly as needed      gabapentin (NEURONTIN) 100 MG capsule Take 1 capsule by mouth 3 times daily for 180 days. Intended supply: 90 days 90 capsule 2    Handicap Placard MISC by Does not apply route Good for 5 years. Expires 3/10/2026. 1 each 0    ofloxacin (OCUFLOX) 0.3 % solution       oxyCODONE (ROXICODONE) 5 MG/5ML solution       cetirizine (ZYRTEC) 10 MG tablet Take 1 tablet by mouth daily (Patient not taking: Reported on 4/26/2021) 30 tablet 11     No current facility-administered medications for this visit. Objective    Vitals:    04/26/21 1142   BP: 138/80   Pulse: 79   Resp: 16   Temp: 97.5 °F (36.4 °C)   TempSrc: Oral   SpO2: 99%   Height: 5' 6\" (1.676 m)     Physical Exam  Constitutional:       Appearance: Normal appearance. HENT:      Head:      Comments: Cranial asymmetry  Eyes:      Extraocular Movements: Extraocular movements intact. Conjunctiva/sclera: Conjunctivae normal.      Pupils: Pupils are equal, round, and reactive to light. Neck:      Musculoskeletal: Normal range of motion and neck supple. Thyroid: No thyromegaly. Cardiovascular:      Rate and Rhythm: Normal rate and regular rhythm. Heart sounds: Normal heart sounds. No murmur. Pulmonary:      Effort: Pulmonary effort is normal. No respiratory distress. Breath sounds: Normal breath sounds. No wheezing. Abdominal:      General: Bowel sounds are normal.      Palpations: Abdomen is soft. Tenderness: There is no abdominal tenderness. There is no guarding. Musculoskeletal:         General: Tenderness present. Lymphadenopathy:      Cervical: No cervical adenopathy. Skin:     General: Skin is warm and dry. Neurological:      Mental Status: He is alert and oriented to person, place, and time.       Comments: Left sided hemiparesis   Psychiatric:         Mood and Affect: Mood normal.         Thought Content: Thought content normal.         Judgment: Judgment normal.              Assessment & Plan    Diagnosis Orders   1. Left hemiparesis ([address]     2. History of stroke     3. Iron deficiency anemia, unspecified iron deficiency anemia type  CBC With Auto Differential    Hemoglobin A1C    Iron And Tibc    Vitamin B12 & Folate   4. Essential hypertension  Basic Metabolic Panel   5. Mixed hyperlipidemia  Hemoglobin A1C    atorvastatin (LIPITOR) 10 MG tablet    Lipid, Fasting   6. Prostate cancer screening     7. Healthcare maintenance  POCT glycosylated hemoglobin (Hb A1C)         Orders Placed This Encounter   Procedures    CBC With Auto Differential     Standing Status:   Future     Number of Occurrences:   1     Standing Expiration Date:   4/26/2022    Hemoglobin A1C     Standing Status:   Future     Number of Occurrences:   1     Standing Expiration Date:   4/26/2022    Iron And Tibc     Standing Status:   Future     Number of Occurrences:   1     Standing Expiration Date:   4/26/2022     Order Specific Question:   Is Patient Fasting? Answer:   8     Order Specific Question:   No of Hours?      Answer:   y    Basic Metabolic Panel     Standing Status:   Future     Number of Occurrences:   1     Standing Expiration Date:   4/26/2022    Vitamin B12 & Folate     Standing Status:   Future     Number of Occurrences:   1     Standing Expiration Date:   4/26/2022    Lipid, Fasting     Standing Status:   Future     Number of Occurrences:   1     Standing Expiration Date:   4/26/2022    POCT glycosylated hemoglobin (Hb A1C)     Orders Placed This Encounter   Medications    atorvastatin (LIPITOR) 10 MG tablet     Sig: take 1 tablet by mouth once daily     Dispense:  90 tablet     Refill:  2    metoprolol tartrate (LOPRESSOR) 25 MG tablet     Sig: Take 2 tablets by mouth daily     Dispense:  90 tablet     Refill:  2     Medications Discontinued During This Encounter   Medication Reason

## 2021-04-27 ENCOUNTER — OFFICE VISIT (OUTPATIENT)
Dept: NEUROLOGY | Age: 61
End: 2021-04-27
Payer: COMMERCIAL

## 2021-04-27 VITALS — SYSTOLIC BLOOD PRESSURE: 122 MMHG | DIASTOLIC BLOOD PRESSURE: 80 MMHG | HEART RATE: 91 BPM | OXYGEN SATURATION: 95 %

## 2021-04-27 DIAGNOSIS — I63.10 CEREBROVASCULAR ACCIDENT (CVA) DUE TO EMBOLISM OF PRECEREBRAL ARTERY (HCC): Primary | ICD-10-CM

## 2021-04-27 DIAGNOSIS — I63.9 MOOD DISORDER DUE TO ACUTE STROKE (HCC): ICD-10-CM

## 2021-04-27 DIAGNOSIS — I69.391 DYSPHAGIA DUE TO RECENT CEREBRAL INFARCTION: ICD-10-CM

## 2021-04-27 DIAGNOSIS — G81.94 LEFT HEMIPARESIS (HCC): ICD-10-CM

## 2021-04-27 DIAGNOSIS — Z98.890 H/O CRANIOTOMY: ICD-10-CM

## 2021-04-27 DIAGNOSIS — I69.354 SPASTIC HEMIPLEGIA OF LEFT NONDOMINANT SIDE AS LATE EFFECT OF CEREBRAL INFARCTION (HCC): ICD-10-CM

## 2021-04-27 DIAGNOSIS — R25.2 SPASTICITY: ICD-10-CM

## 2021-04-27 DIAGNOSIS — F06.30 MOOD DISORDER DUE TO ACUTE STROKE (HCC): ICD-10-CM

## 2021-04-27 PROCEDURE — 99204 OFFICE O/P NEW MOD 45 MIN: CPT | Performed by: PSYCHIATRY & NEUROLOGY

## 2021-04-27 PROCEDURE — 1111F DSCHRG MED/CURRENT MED MERGE: CPT | Performed by: PSYCHIATRY & NEUROLOGY

## 2021-04-27 PROCEDURE — 3017F COLORECTAL CA SCREEN DOC REV: CPT | Performed by: PSYCHIATRY & NEUROLOGY

## 2021-04-27 PROCEDURE — 1036F TOBACCO NON-USER: CPT | Performed by: PSYCHIATRY & NEUROLOGY

## 2021-04-27 PROCEDURE — G8420 CALC BMI NORM PARAMETERS: HCPCS | Performed by: PSYCHIATRY & NEUROLOGY

## 2021-04-27 PROCEDURE — G8427 DOCREV CUR MEDS BY ELIG CLIN: HCPCS | Performed by: PSYCHIATRY & NEUROLOGY

## 2021-04-27 ASSESSMENT — ENCOUNTER SYMPTOMS
TROUBLE SWALLOWING: 0
VOMITING: 0
COLOR CHANGE: 0
CHOKING: 0
BACK PAIN: 0
SHORTNESS OF BREATH: 0
NAUSEA: 0
PHOTOPHOBIA: 0

## 2021-04-27 NOTE — PROGRESS NOTES
Subjective:      Patient ID: Mihaela Kelley is a 64 y.o. male who presents today for:  Chief Complaint   Patient presents with    New Patient     Pts sister and brother state he had a stroke Decmeber 20th oneal wanted to him to see you to replace the skull piece thats missing. December 22nd of 2020 he had the surgery Cathy Jones was the one who did the surgery. Pt was referred by [facility] in February but is now seeing Dina Chu as his primary. Pt is paralyzed from the stroke on his left side. Pt is having Occupational Therapy 4-5 times a week hes almost done with it he finished his speech therapy and will be starting physical therapy soon. HPI 57-year-old right-handed gentleman history of cerebrovascular disease with dysphagia with left hemiparesis. We have never seen this patient. Patient has history of a left large MCA territory infarct. This is causing left hemiplegia. Patient had a stroke in December. It was very unclear the onset of timing and therefore it was unclear whether he would be a TPA candidate he was seen at [facility] and had a massive MCA stroke transferred to [facility] where he had a craniotomy with open flap. Is come a long way and returned home now and he is moving his left lower extremity to some degree but his upper extremities paralyzed he has no major cognitive issues though I am sure he has neglect. His feeding tube has been discontinued he is eating a normal diet he mostly feeds himself. He has a very supportive family. Patient had developed a DVT in the interim was on anticoagulation for some time on Coumadin which is now been discontinued he is not on antiepileptics.     Past Medical History:   Diagnosis Date    Hyperlipidemia     Hypertension     Osteoarthritis     S/P inguinal herniorrhaphy     left    Stroke ([facility] [address] 12/20/2020     Past Surgical History:   Procedure Laterality Date    HERNIA REPAIR Bilateral     [place] COLON CA SCRN NOT HI RSK IND N/A 10/6/2017    COLONOSCOPY performed by Tiffany Juarez MD at [facility]       Social History     Socioeconomic History    Marital status:      Spouse name: Not on file    Number of children: Not on file    Years of education: Not on file    Highest education level: Not on file   Occupational History    Not on file   Social Needs    Financial resource strain: Not very hard    Food insecurity     Worry: Not on file     Inability: Not on file    Transportation needs     Medical: Not on file     Non-medical: Not on file   Tobacco Use    Smoking status: Former Smoker     Packs/day: 1.00     Years: 20.00     Pack years: 20.00     Types: Cigarettes     Start date: 0     Quit date: 2020     Years since quittin.3    Smokeless tobacco: Never Used   Substance and Sexual Activity    Alcohol use: Yes     Alcohol/week: 18.0 standard drinks     Types: 18 Cans of beer per week    Drug use: Yes     Types: Marijuana     Comment: 2X/ month    Sexual activity: Not on file     Comment:    Lifestyle    Physical activity     Days per week: Not on file     Minutes per session: Not on file    Stress: Not on file   Relationships    Social connections     Talks on phone: Not on file     Gets together: Not on file     Attends Cheondoism service: Not on file     Active member of club or organization: Not on file     Attends meetings of clubs or organizations: Not on file     Relationship status: Not on file    Intimate partner violence     Fear of current or ex partner: Not on file     Emotionally abused: Not on file     Physically abused: Not on file     Forced sexual activity: Not on file   Other Topics Concern    Not on file   Social History Narrative    Not on file     No family history on file.   No Known Allergies    Current Outpatient Medications   Medication Sig Dispense Refill    atorvastatin (LIPITOR) 10 MG tablet take 1 tablet by mouth once and sleep disturbance. Patient himself does not voice any of these complaints but I am sure this is somewhat cognitively compromised    Objective:   /80 (Site: Left Upper Arm, Position: Sitting, Cuff Size: Medium Adult)   Pulse 91   SpO2 95%     Physical Exam  Vitals signs reviewed. Eyes:      Pupils: Pupils are equal, round, and reactive to light. Neck:      Musculoskeletal: Normal range of motion. Cardiovascular:      Rate and Rhythm: Normal rate and regular rhythm. Heart sounds: No murmur. Pulmonary:      Effort: Pulmonary effort is normal.      Breath sounds: Normal breath sounds. Abdominal:      General: Bowel sounds are normal.   Musculoskeletal: Normal range of motion. Skin:     General: Skin is warm. Neurological:      Mental Status: He is alert and oriented to person, place, and time. Cranial Nerves: No cranial nerve deficit. Sensory: No sensory deficit. Motor: No abnormal muscle tone. Coordination: Coordination normal.      Deep Tendon Reflexes: Reflexes are normal and symmetric. Babinski sign absent on the right side. Babinski sign absent on the left side. Comments: In addition to the above examination patient has complete paralysis of the left upper extremity with clonus and contractures at the elbow. In the lower extremity has significant tightness of the hamstring muscle with pain and he has a foot drop and his strength is 2/5. On the right is strength is 5 5 is areflexic in the lower extremities with some hyperreflexia on the left upper extremity with clonus. There is no ankle clonus. We did not attempt to walk him. Patient has an open flap craniotomy on the right with no bone   Psychiatric:         Mood and Affect: Mood normal.         Ct Abdomen Pelvis Wo Contrast Additional Contrast? None    Result Date: 4/5/2021  CT ABDOMEN PELVIS WO CONTRAST: 4/5/2021 CLINICAL HISTORY:  flank pain+fever . COMPARISON: 2/13/2021.  TECHNIQUE: Spiral images were obtained of the abdomen and pelvis without contrast. All CT scans at this facility use dose modulation, iterative reconstruction, and/or weight based dosing when appropriate to reduce radiation dose to as low as reasonably achievable. FINDINGS: Mild to moderate inflammation has developed around the nearly decompressed urinary bladder, and to a lesser extent adjacent to the mildly enlarged prostate gland, perirectal fat around an otherwise unremarkable rectum. This is probably secondary to uncomplicated cystitis, prostatitis and/or proctitis. There are no urinary tract calculi, hydronephrosis, or other significant changes from the prior study identified There is no abnormal bowel or biliary dilatation, ascites, significant lymphadenopathy, hernias, or other significant changes identified. An inferior vena cava filter and mild atherosclerotic plaquing of a normal caliber abdominal aorta and branch vessels is again noted. The unenhanced liver, gallbladder, spleen, pancreas, adrenal glands, kidneys, great vessels, unopacified bowel loops, and additional images of pelvis appear within normal limits. Mild probable dependent atelectasis is present of the visualized lung bases. The visualized musculoskeletal structures are unremarkable. MILD INFLAMMATION AROUND THE NEARLY DECOMPRESSED URINARY BLADDER, MILDLY ENLARGED PROSTATE AND OTHERWISE UNREMARKABLE RECTUM, WHICH IS PROBABLY CYSTITIS, PROSTATITIS AND/OR PROCTITIS. NO COMPLICATION, URINARY TRACT CALCULI, OBSTRUCTION, OR OTHER SIGNIFICANT CHANGES FROM 2/13/2021 IDENTIFIED. Xr Chest Portable    Result Date: 4/6/2021  EXAMINATION: XR CHEST PORTABLE CLINICAL HISTORY: FEVER COMPARISONS: DECEMBER 7, 2012. FINDINGS: Patient leaning to left. Osseous structures intact. Cardiopericardial silhouette normal. Vasculature normal. Lungs clear. NO ACUTE CARDIOPULMONARY DISEASE.       Lab Results   Component Value Date    WBC 4.8 04/26/2021    RBC 4.90 04/26/2021    RBC 4.64 05/18/2012    HGB 10.5 04/26/2021    HCT 34.3 04/26/2021    MCV 69.9 04/26/2021    MCH 21.4 04/26/2021    MCHC 30.6 04/26/2021    RDW 17.3 04/26/2021     04/26/2021    MPV 8.9 09/10/2015     Lab Results   Component Value Date     04/26/2021    K 3.7 04/26/2021    K 4.0 04/10/2021    CL 96 04/26/2021    CO2 25 04/26/2021    BUN 13 04/26/2021    CREATININE 0.79 04/26/2021    GFRAA >60.0 04/26/2021    LABGLOM >60.0 04/26/2021    GLUCOSE 74 04/26/2021    GLUCOSE 92 05/18/2012    PROT 6.6 04/07/2021    LABALBU 3.2 04/07/2021    LABALBU 4.2 05/18/2012    CALCIUM 10.1 04/26/2021    BILITOT <0.2 04/07/2021    ALKPHOS 78 04/07/2021    AST 9 04/07/2021    ALT 8 04/07/2021     Lab Results   Component Value Date    PROTIME 27.3 02/13/2021    INR 2.5 02/13/2021     Lab Results   Component Value Date    WPZBPKUQ99 586 04/26/2021    FOLATE 15.4 04/26/2021    IRON 30 04/26/2021    TIBC 474 04/26/2021     Lab Results   Component Value Date    TRIG 107 02/09/2019    HDL 41 04/26/2021    LDLCALC 185 04/26/2021     Lab Results   Component Value Date    ETOH <10 02/13/2021     No results found for: LITHIUM, DILFRTOT, VALPROATE    Assessment:       Diagnosis Orders   1. Cerebrovascular accident (CVA) due to embolism of precerebral artery ([address]     2. Dysphagia due to recent cerebral infarction     3. Left hemiparesis ([address]     4. 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 Spastic hemiplegia of left nondominant side as late effect of cerebral infarction (HCC)     6. Spasticity     7. H/O craniotomy     Large right middle cerebral artery stroke with cerebral edema with midline shift's likely requiring a craniotomy. Patient stroke occurred in December and has come a long way. He still has an open flap and will require reconstruction. Patient had a DVT and is no longer on Coumadin. Patient's dysphagia is improved he is no longer on a PEG tube.     Patient deficits are those of left hemiplegia spasticity with some clonus. It is more than likely that he has some degree of neglect which might still continue to improve. We had a lengthy discussion family guarding aggressive physical therapy ongoing for still for now. Patient may require about amatoxin for spasticity as this may help at a later date. For now the urgency is that of reconstructing the cranium and the bone flap is at [facility].  We have given them the name of Dr. Tahmina Salazar as he performed the surgery and this can be now reconstructed and there is no contraindication. I have recommended that he start the anticonvulsant which she has at home is likely to be Keppra as patient is a very high risk for seizures in the next year due to the large scar. We will follow him again for rehabilitation in 3 months      Plan:      No orders of the defined types were placed in this encounter. No orders of the defined types were placed in this encounter. Return in about 3 months (around 7/27/2021).       Megan Stewart MD

## 2021-04-27 NOTE — TELEPHONE ENCOUNTER
Berlin Grayson from [facility] is calling to ask for a verbal for 2 visits for occupational therapy. States he would like this to start this week. Berlin Grayson: [phone]    Please advise and thank you.
Rika Pickard is aware. No other questions.
Verbal is OK. Agree with OT.
Patient Needs Assistance to Leave Residence...

## 2021-04-28 ENCOUNTER — TELEPHONE (OUTPATIENT)
Dept: FAMILY MEDICINE CLINIC | Age: 61
End: 2021-04-28

## 2021-04-28 DIAGNOSIS — D50.9 IRON DEFICIENCY ANEMIA, UNSPECIFIED IRON DEFICIENCY ANEMIA TYPE: Primary | ICD-10-CM

## 2021-04-28 RX ORDER — FERROUS SULFATE 325(65) MG
325 TABLET ORAL
Qty: 90 TABLET | Refills: 1 | Status: SHIPPED | OUTPATIENT
Start: 2021-04-28

## 2021-04-30 ENCOUNTER — APPOINTMENT (OUTPATIENT)
Dept: CT IMAGING | Age: 61
End: 2021-04-30
Payer: COMMERCIAL

## 2021-04-30 ENCOUNTER — HOSPITAL ENCOUNTER (EMERGENCY)
Age: 61
Discharge: OTHER INSTITUTION | End: 2021-04-30
Attending: EMERGENCY MEDICINE
Payer: COMMERCIAL

## 2021-04-30 VITALS
TEMPERATURE: 97.8 F | DIASTOLIC BLOOD PRESSURE: 87 MMHG | SYSTOLIC BLOOD PRESSURE: 159 MMHG | HEART RATE: 72 BPM | WEIGHT: 154 LBS | BODY MASS INDEX: 24.86 KG/M2 | OXYGEN SATURATION: 100 % | RESPIRATION RATE: 16 BRPM

## 2021-04-30 DIAGNOSIS — R22.0 HEAD SWELLING: Primary | ICD-10-CM

## 2021-04-30 LAB
ALBUMIN SERPL-MCNC: 3.9 G/DL (ref 3.5–4.6)
ALP BLD-CCNC: 78 U/L (ref 35–104)
ALT SERPL-CCNC: 8 U/L (ref 0–41)
ANION GAP SERPL CALCULATED.3IONS-SCNC: 10 MEQ/L (ref 9–15)
ANISOCYTOSIS: ABNORMAL
APTT: 28 SEC (ref 24.4–36.8)
AST SERPL-CCNC: 9 U/L (ref 0–40)
BASOPHILS ABSOLUTE: 0 K/UL (ref 0–0.2)
BASOPHILS RELATIVE PERCENT: 0.8 %
BILIRUB SERPL-MCNC: <0.2 MG/DL (ref 0.2–0.7)
BUN BLDV-MCNC: 16 MG/DL (ref 8–23)
CALCIUM SERPL-MCNC: 9.1 MG/DL (ref 8.5–9.9)
CHLORIDE BLD-SCNC: 105 MEQ/L (ref 95–107)
CO2: 24 MEQ/L (ref 20–31)
CREAT SERPL-MCNC: 0.76 MG/DL (ref 0.7–1.2)
EOSINOPHILS ABSOLUTE: 0.2 K/UL (ref 0–0.7)
EOSINOPHILS RELATIVE PERCENT: 3.6 %
GFR AFRICAN AMERICAN: >60
GFR NON-AFRICAN AMERICAN: >60
GLOBULIN: 3.8 G/DL (ref 2.3–3.5)
GLUCOSE BLD-MCNC: 88 MG/DL (ref 70–99)
HCT VFR BLD CALC: 30.5 % (ref 42–52)
HEMOGLOBIN: 9.5 G/DL (ref 14–18)
HYPOCHROMIA: ABNORMAL
INR BLD: 1
LYMPHOCYTES ABSOLUTE: 1.5 K/UL (ref 1–4.8)
LYMPHOCYTES RELATIVE PERCENT: 29.9 %
MAGNESIUM: 2 MG/DL (ref 1.7–2.4)
MCH RBC QN AUTO: 21.6 PG (ref 27–31.3)
MCHC RBC AUTO-ENTMCNC: 31.3 % (ref 33–37)
MCV RBC AUTO: 69.2 FL (ref 80–100)
MICROCYTES: ABNORMAL
MONOCYTES ABSOLUTE: 0.4 K/UL (ref 0.2–0.8)
MONOCYTES RELATIVE PERCENT: 7.9 %
NEUTROPHILS ABSOLUTE: 2.9 K/UL (ref 1.4–6.5)
NEUTROPHILS RELATIVE PERCENT: 57.8 %
PDW BLD-RTO: 17.5 % (ref 11.5–14.5)
PLATELET # BLD: 450 K/UL (ref 130–400)
POIKILOCYTES: ABNORMAL
POTASSIUM SERPL-SCNC: 3.7 MEQ/L (ref 3.4–4.9)
PROTHROMBIN TIME: 13.1 SEC (ref 12.3–14.9)
RBC # BLD: 4.41 M/UL (ref 4.7–6.1)
SLIDE REVIEW: ABNORMAL
SODIUM BLD-SCNC: 139 MEQ/L (ref 135–144)
TOTAL PROTEIN: 7.7 G/DL (ref 6.3–8)
TROPONIN: <0.01 NG/ML (ref 0–0.01)
WBC # BLD: 5.1 K/UL (ref 4.8–10.8)

## 2021-04-30 PROCEDURE — 80053 COMPREHEN METABOLIC PANEL: CPT

## 2021-04-30 PROCEDURE — 99285 EMERGENCY DEPT VISIT HI MDM: CPT

## 2021-04-30 PROCEDURE — 85730 THROMBOPLASTIN TIME PARTIAL: CPT

## 2021-04-30 PROCEDURE — 85610 PROTHROMBIN TIME: CPT

## 2021-04-30 PROCEDURE — 85025 COMPLETE CBC W/AUTO DIFF WBC: CPT

## 2021-04-30 PROCEDURE — 84484 ASSAY OF TROPONIN QUANT: CPT

## 2021-04-30 PROCEDURE — 36415 COLL VENOUS BLD VENIPUNCTURE: CPT

## 2021-04-30 PROCEDURE — 2580000003 HC RX 258: Performed by: EMERGENCY MEDICINE

## 2021-04-30 PROCEDURE — 83735 ASSAY OF MAGNESIUM: CPT

## 2021-04-30 PROCEDURE — 70450 CT HEAD/BRAIN W/O DYE: CPT

## 2021-04-30 RX ORDER — 0.9 % SODIUM CHLORIDE 0.9 %
1000 INTRAVENOUS SOLUTION INTRAVENOUS ONCE
Status: COMPLETED | OUTPATIENT
Start: 2021-04-30 | End: 2021-04-30

## 2021-04-30 RX ADMIN — SODIUM CHLORIDE 1000 ML: 9 INJECTION, SOLUTION INTRAVENOUS at 15:30

## 2021-04-30 ASSESSMENT — ENCOUNTER SYMPTOMS
BACK PAIN: 0
DIARRHEA: 0
SHORTNESS OF BREATH: 0
ABDOMINAL PAIN: 0
COUGH: 0
NAUSEA: 0
VOMITING: 0
SORE THROAT: 0

## 2021-04-30 NOTE — ED PROVIDER NOTES
[facility] ED  eMERGENCYdEPARTMENT eNCOUnter      Pt Name: Elroy Farah  MRN: [id number]  [place] 1960  Date of evaluation: 4/30/2021  Divya Bundy MD    CHIEF COMPLAINT           HPI  Elroy Farah is a 64 y.o. male per chart review has a h/o CVA with hemorrhagic conversion s/p craniotomy with L sided weakness, HTN, hpl, OA presents to the ED with R head swelling. Pt notes gradual onset, moderate, constant, swelling of R temporal/parietal area. Pt denies headache, n/v, cp, sob, ab pain, dysuria, diarrhea. ROS  Review of Systems   Constitutional: Negative for activity change, chills and fever. HENT: Negative for ear pain and sore throat. R head swelling   Eyes: Negative for visual disturbance. Respiratory: Negative for cough and shortness of breath. Cardiovascular: Negative for chest pain, palpitations and leg swelling. Gastrointestinal: Negative for abdominal pain, diarrhea, nausea and vomiting. Genitourinary: Negative for dysuria. Musculoskeletal: Negative for back pain. Skin: Negative for rash. Neurological: Negative for dizziness and weakness. Except as noted above the remainder of the review of systems was reviewed and negative. PAST MEDICAL HISTORY     Past Medical History:   Diagnosis Date    Hyperlipidemia     Hypertension     Osteoarthritis     S/P inguinal herniorrhaphy     left    Stroke ([facility] [address] 12/20/2020         SURGICAL HISTORY       Past Surgical History:   Procedure Laterality Date    HERNIA REPAIR Bilateral     [place] COLON CA SCRN NOT HI RSK IND N/A 10/6/2017    COLONOSCOPY performed by Sadiq Parham MD at [address]       Previous Medications    AMLODIPINE (NORVASC) 10 MG TABLET    1 tablet via PEG daily.     ASPIRIN 81 MG CHEWABLE TABLET    Take 81 mg by mouth daily    ATORVASTATIN (LIPITOR) 10 MG TABLET    take 1 tablet by mouth once daily CETIRIZINE (ZYRTEC) 10 MG TABLET    Take 1 tablet by mouth daily    FERROUS SULFATE (IRON 325) 325 (65 FE) MG TABLET    Take 1 tablet by mouth daily (with breakfast)    GABAPENTIN (NEURONTIN) 100 MG CAPSULE    Take 1 capsule by mouth 3 times daily for 180 days. Intended supply: 90 days    HANDICAP PLACARD MISC    by Does not apply route Good for 5 years. Expires 3/10/2026. HYDROCHLOROTHIAZIDE (HYDRODIURIL) 25 MG TABLET    Take 1 tablet by mouth every morning    MELATONIN 1 MG TABLET    Take 10 mg by mouth nightly as needed    METOPROLOL TARTRATE (LOPRESSOR) 25 MG TABLET    GIVE 1 TABLET VIA PEG TUBE IN THE MORNING AND AT BEDTIME    METOPROLOL TARTRATE (LOPRESSOR) 25 MG TABLET    Take 2 tablets by mouth daily    OFLOXACIN (OCUFLOX) 0.3 % SOLUTION        OXYCODONE (ROXICODONE) 5 MG/5ML SOLUTION           ALLERGIES     Patient has no known allergies. FAMILY HISTORY     History reviewed. No pertinent family history. SOCIAL HISTORY       Social History     Socioeconomic History    Marital status:      Spouse name: None    Number of children: None    Years of education: None    Highest education level: None   Occupational History    None   Social Needs    Financial resource strain: Not very hard    Food insecurity     Worry: None     Inability: None    Transportation needs     Medical: None     Non-medical: None   Tobacco Use    Smoking status: Former Smoker     Packs/day: 1.00     Years: 20.00     Pack years: 20.00     Types: Cigarettes     Start date: 0     Quit date: 2020     Years since quittin.3    Smokeless tobacco: Never Used   Substance and Sexual Activity    Alcohol use:  Yes     Alcohol/week: 18.0 standard drinks     Types: 18 Cans of beer per week    Drug use: Yes     Types: Marijuana     Comment: 2X/ month    Sexual activity: None     Comment:    Lifestyle    Physical activity     Days per week: None     Minutes per session: None    Stress: None Relationships    Social connections     Talks on phone: None     Gets together: None     Attends Latter-day service: None     Active member of club or organization: None     Attends meetings of clubs or organizations: None     Relationship status: None    Intimate partner violence     Fear of current or ex partner: None     Emotionally abused: None     Physically abused: None     Forced sexual activity: None   Other Topics Concern    None   Social History Narrative    None         PHYSICAL EXAM       ED Triage Vitals [04/30/21 1401]   BP Temp Temp Source Pulse Resp SpO2 Height Weight   (!) 163/85 97.8 °F (36.6 °C) Oral 71 18 100 % -- 154 lb (69.9 kg)       Physical Exam  Vitals signs and nursing note reviewed. Constitutional:       Appearance: He is well-developed. HENT:      Head: Normocephalic. Comments: +Swelling noted over R frontal/parietal area. Right Ear: External ear normal.      Left Ear: External ear normal.   Eyes:      Conjunctiva/sclera: Conjunctivae normal.      Pupils: Pupils are equal, round, and reactive to light. Neck:      Musculoskeletal: Normal range of motion and neck supple. Cardiovascular:      Rate and Rhythm: Normal rate and regular rhythm. Heart sounds: Normal heart sounds. Pulmonary:      Effort: Pulmonary effort is normal.      Breath sounds: Normal breath sounds. Abdominal:      General: Bowel sounds are normal. There is no distension. Palpations: Abdomen is soft. Tenderness: There is no abdominal tenderness. Musculoskeletal: Normal range of motion. Skin:     General: Skin is warm and dry. Neurological:      Mental Status: He is alert and oriented to person, place, and time. Psychiatric:      Comments: Flat affect           MDM  65 yo male presents to the ED with R frontal/parietal swelling. Pt is afebrile, hemodynamically stable. Pt given 1 L NS in the ED. Labs remarkable for Hb 9.5.   CT head shows no acute process with chronic post surgical changes. Pt reassessed and doing well. Case discussed with  Nsgy Dr. Pj Ponce who believed pt was stable to go home and f/u. Pt has f/u with nsgy next month. Pt and family educated about the results. Pt given head swelling warning signs and will f/u with pcp. Pt and family understands plan. FINAL IMPRESSION      1.  Head swelling          DISPOSITION/PLAN   DISPOSITION Decision To Discharge 04/30/2021 04:43:36 PM        DISCHARGE MEDICATIONS:  [unfilled]         Edgardo Roberts MD(electronically signed)  Attending Emergency Physician            Edgardo Roberts MD  04/30/21 38 Nohemi Crespo MD  04/30/21 1562

## 2021-04-30 NOTE — ED NOTES
Pt understands discharge instructions.   Pt instructed to follow up with PCP     Pt told to come back for new or worsening symptoms  No further questions         Orpha LINDA Gonzales  04/30/21 [id number]

## 2021-04-30 NOTE — ED TRIAGE NOTES
Pt arrived via ems from home with family c/o rt side of head swelling and lt foot swelling. Pt had craniotomy December 20th 2020 for hemorrhage. Per ems swelling has gone down somewhat. Pt denies any pain and would not be here if family did not notice swelling. Pt a/o x 3 skin pink w/d resp non labored. Pt non ambulatory since craniotomy and area of skull has not been replaced. O/e area swollen and soft.

## 2021-05-14 ENCOUNTER — TELEPHONE (OUTPATIENT)
Dept: FAMILY MEDICINE CLINIC | Age: 61
End: 2021-05-14

## 2021-05-14 NOTE — TELEPHONE ENCOUNTER
Akin Coates from [facility] called about the patient's wheelchair. She received the CMN but she still needs the face to face office notes. Can this please be faxed to [facility] at [phone]. Thank you.

## 2021-06-01 ENCOUNTER — TELEPHONE (OUTPATIENT)
Dept: FAMILY MEDICINE CLINIC | Age: 61
End: 2021-06-01

## 2021-06-01 NOTE — TELEPHONE ENCOUNTER
Ruben Wu has been taking anti seizure medication Since his stroke. I can't find it anywhere on his med lists. HIs sister spelled it out for me, but I can't get it to come up. Levetirace-chisholm  500 mg. 1 q am and 1 qhs  #60 5rf to [facility].

## 2021-06-02 RX ORDER — LEVETIRACETAM 500 MG/1
500 TABLET ORAL 2 TIMES DAILY
Qty: 60 TABLET | Refills: 5 | Status: SHIPPED | OUTPATIENT
Start: 2021-06-02

## 2021-06-10 ENCOUNTER — TELEPHONE (OUTPATIENT)
Dept: FAMILY MEDICINE CLINIC | Age: 61
End: 2021-06-10

## 2021-06-10 ENCOUNTER — VIRTUAL VISIT (OUTPATIENT)
Dept: FAMILY MEDICINE CLINIC | Age: 61
End: 2021-06-10
Payer: COMMERCIAL

## 2021-06-10 DIAGNOSIS — G81.94 LEFT HEMIPARESIS (HCC): ICD-10-CM

## 2021-06-10 DIAGNOSIS — I69.398 SPASTICITY AS LATE EFFECT OF CEREBROVASCULAR ACCIDENT (CVA): ICD-10-CM

## 2021-06-10 DIAGNOSIS — R25.2 SPASTICITY AS LATE EFFECT OF CEREBROVASCULAR ACCIDENT (CVA): ICD-10-CM

## 2021-06-10 DIAGNOSIS — J30.9 ALLERGIC RHINITIS, UNSPECIFIED SEASONALITY, UNSPECIFIED TRIGGER: Primary | ICD-10-CM

## 2021-06-10 PROCEDURE — 3017F COLORECTAL CA SCREEN DOC REV: CPT | Performed by: PHYSICIAN ASSISTANT

## 2021-06-10 PROCEDURE — G8427 DOCREV CUR MEDS BY ELIG CLIN: HCPCS | Performed by: PHYSICIAN ASSISTANT

## 2021-06-10 PROCEDURE — 1036F TOBACCO NON-USER: CPT | Performed by: PHYSICIAN ASSISTANT

## 2021-06-10 PROCEDURE — G8420 CALC BMI NORM PARAMETERS: HCPCS | Performed by: PHYSICIAN ASSISTANT

## 2021-06-10 PROCEDURE — 99212 OFFICE O/P EST SF 10 MIN: CPT | Performed by: PHYSICIAN ASSISTANT

## 2021-06-10 RX ORDER — CETIRIZINE HYDROCHLORIDE 10 MG/1
10 TABLET ORAL DAILY
Qty: 30 TABLET | Refills: 11 | Status: SHIPPED | OUTPATIENT
Start: 2021-06-10

## 2021-06-10 ASSESSMENT — ENCOUNTER SYMPTOMS: RHINORRHEA: 1

## 2021-06-10 NOTE — TELEPHONE ENCOUNTER
Called Pierce woodson today's vv.  LM to CB regarding scheduling f/u visit.    Pt also needs to discuss copayment

## 2021-06-10 NOTE — PROGRESS NOTES
Subjective  Kofi Mckeon, 64 y.o. male presents today with:  Chief Complaint   Patient presents with    Follow-up     Pt is here for 6 week follow-up. Pts sister has questions about pts physical therapy. Patient is getting stiff & wants to know if 10 visitis of OT & 10 visits of PT can be reordered. Hard to move around. HPI  Telemedicine telephone visit due to concern for exposure to COVID-19 (coronavirus). Patient sister who acts as his POA was the historian  Patient has not had PT OT in the past month has had increased stiffness and spasticity of his muscles particularly of the left  Upper and lower extremity  Having some postnasal drip drainage allergy symptoms  No fevers chills  Eating well   no seizures  To f.u with  for reconstruction of craniotomy flap    Review of Systems   Constitutional: Positive for activity change. HENT: Positive for postnasal drip and rhinorrhea. Musculoskeletal:        See hpi   Allergic/Immunologic: Positive for environmental allergies. All other systems reviewed and are negative.         Past Medical History:   Diagnosis Date    Hyperlipidemia     Hypertension     Osteoarthritis     S/P inguinal herniorrhaphy     left    Stroke ([facility] [address] 2020     Past Surgical History:   Procedure Laterality Date    HERNIA REPAIR Bilateral     CA COLON CA SCRN NOT HI RSK IND N/A 10/6/2017    COLONOSCOPY performed by Quynh Tony MD at [address] [place] [address]       Social History     Socioeconomic History    Marital status:      Spouse name: Not on file    Number of children: Not on file    Years of education: Not on file    Highest education level: Not on file   Occupational History    Not on file   Tobacco Use    Smoking status: Former Smoker     Packs/day: 1.00     Years: 20.00     Pack years: 20.00     Types: Cigarettes     Start date: 0     Quit date: 2020     Years since quittin.4    Smokeless tobacco: Never Used   Vaping Use    Vaping Use: Never used   Substance and Sexual Activity    Alcohol use: Yes     Alcohol/week: 18.0 standard drinks     Types: 18 Cans of beer per week    Drug use: Yes     Types: Marijuana     Comment: 2X/ month    Sexual activity: Not on file     Comment:    Other Topics Concern    Not on file   Social History Narrative    Not on file     Social Determinants of Health     Financial Resource Strain: Low Risk     Difficulty of Paying Living Expenses: Not very hard   Food Insecurity:     Worried About Running Out of Food in the Last Year:     Ran Out of Food in the Last Year:    Transportation Needs:     Lack of Transportation (Medical):  Lack of Transportation (Non-Medical):    Physical Activity:     Days of Exercise per Week:     Minutes of Exercise per Session:    Stress:     Feeling of Stress :    Social Connections:     Frequency of Communication with Friends and Family:     Frequency of Social Gatherings with Friends and Family:     Attends Yarsanism Services:     Active Member of Clubs or Organizations:     Attends Club or Organization Meetings:     Marital Status:    Intimate Partner Violence:     Fear of Current or Ex-Partner:     Emotionally Abused:     Physically Abused:     Sexually Abused:      No family history on file.    No Known Allergies  Current Outpatient Medications   Medication Sig Dispense Refill    gabapentin (NEURONTIN) 100 MG capsule TAKE 1 CAPSULE BY MOUTH THREE TIMES A DAY 90 capsule 5    levETIRAcetam (KEPPRA) 500 MG tablet Take 1 tablet by mouth 2 times daily 60 tablet 5    ferrous sulfate (IRON 325) 325 (65 Fe) MG tablet Take 1 tablet by mouth daily (with breakfast) 90 tablet 1    atorvastatin (LIPITOR) 10 MG tablet take 1 tablet by mouth once daily 90 tablet 2    metoprolol tartrate (LOPRESSOR) 25 MG tablet GIVE 1 TABLET VIA PEG TUBE IN THE MORNING AND AT BEDTIME 180 tablet 4    amLODIPine (NORVASC) 10 MG tablet 1 tablet via PEG daily. 90 tablet 4    ofloxacin (OCUFLOX) 0.3 % solution       aspirin 81 MG chewable tablet Take 81 mg by mouth daily      melatonin 1 MG tablet Take 10 mg by mouth nightly as needed      Handicap Placard MISC by Does not apply route Good for 5 years. Expires 3/10/2026. 1 each 0    metoprolol tartrate (LOPRESSOR) 25 MG tablet Take 2 tablets by mouth daily (Patient not taking: Reported on 6/10/2021) 90 tablet 2    hydroCHLOROthiazide (HYDRODIURIL) 25 MG tablet Take 1 tablet by mouth every morning (Patient not taking: Reported on 6/10/2021) 30 tablet 0    oxyCODONE (ROXICODONE) 5 MG/5ML solution  (Patient not taking: Reported on 6/10/2021)      cetirizine (ZYRTEC) 10 MG tablet Take 1 tablet by mouth daily (Patient not taking: Reported on 6/10/2021) 30 tablet 11     No current facility-administered medications for this visit. Objective    There were no vitals filed for this visit. Physical Exam    Unable to do exam as this is virtual telephone visit. History provided by pt's sister and poa         Assessment & Plan    Diagnosis Orders   1. Allergic rhinitis, unspecified seasonality, unspecified trigger  cetirizine (ZYRTEC) 10 MG tablet   2. Left hemiparesis [facility])  Ambulatory referral to Occupational Therapy    Ambulatory referral to Physical Therapy   3.  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 (CVA)  Ambulatory referral to Occupational Therapy    Ambulatory referral to Physical Therapy         Orders Placed This Encounter   Procedures    Ambulatory referral to Occupational Therapy     Referral Priority:   Routine     Referral Type:   Eval and Treat     Referral Reason:   Specialty Services Required     Referred to Provider:   TAVO Hooks/L     Requested Specialty:   Occupational Therapy     Number of Visits Requested:   1    Ambulatory referral to Physical Therapy     Referral Priority:   Routine     Referral Type:   Eval and Treat     Referral Reason:   Specialty Services Required     Requested Specialty:   Physical Therapy     Number of Visits Requested:   1     Orders Placed This Encounter   Medications    cetirizine (ZYRTEC) 10 MG tablet     Sig: Take 1 tablet by mouth daily     Dispense:  30 tablet     Refill:  11     Medications Discontinued During This Encounter   Medication Reason    cetirizine (ZYRTEC) 10 MG tablet REORDER     No follow-ups on file.     Tierra Cruz PA-C

## 2021-06-15 ENCOUNTER — TELEPHONE (OUTPATIENT)
Dept: NEUROLOGY | Age: 61
End: 2021-06-15

## 2021-06-15 NOTE — TELEPHONE ENCOUNTER
Patients  called, stating that pt has not followed up with Dr. Shannon Avila for jared flap reconstruction at [facility]. She states that when he comes for his appointment if we can remind the family to make this appointment.  The # for his , Marcella Parry is [phone]

## 2021-07-27 ENCOUNTER — OFFICE VISIT (OUTPATIENT)
Dept: NEUROLOGY | Age: 61
End: 2021-07-27
Payer: COMMERCIAL

## 2021-07-27 VITALS — HEART RATE: 74 BPM | DIASTOLIC BLOOD PRESSURE: 84 MMHG | SYSTOLIC BLOOD PRESSURE: 126 MMHG

## 2021-07-27 DIAGNOSIS — G83.9 PARALYSIS (HCC): ICD-10-CM

## 2021-07-27 DIAGNOSIS — I69.354 SPASTIC HEMIPLEGIA OF LEFT NONDOMINANT SIDE AS LATE EFFECT OF CEREBRAL INFARCTION (HCC): ICD-10-CM

## 2021-07-27 DIAGNOSIS — Z74.09 IMMOBILITY: ICD-10-CM

## 2021-07-27 DIAGNOSIS — I63.10 CEREBROVASCULAR ACCIDENT (CVA) DUE TO EMBOLISM OF PRECEREBRAL ARTERY (HCC): Primary | ICD-10-CM

## 2021-07-27 DIAGNOSIS — G81.94 LEFT HEMIPARESIS (HCC): ICD-10-CM

## 2021-07-27 DIAGNOSIS — R25.2 SPASTICITY: ICD-10-CM

## 2021-07-27 PROCEDURE — 99214 OFFICE O/P EST MOD 30 MIN: CPT | Performed by: PSYCHIATRY & NEUROLOGY

## 2021-07-27 PROCEDURE — G8427 DOCREV CUR MEDS BY ELIG CLIN: HCPCS | Performed by: PSYCHIATRY & NEUROLOGY

## 2021-07-27 PROCEDURE — G8420 CALC BMI NORM PARAMETERS: HCPCS | Performed by: PSYCHIATRY & NEUROLOGY

## 2021-07-27 PROCEDURE — 3017F COLORECTAL CA SCREEN DOC REV: CPT | Performed by: PSYCHIATRY & NEUROLOGY

## 2021-07-27 PROCEDURE — 1036F TOBACCO NON-USER: CPT | Performed by: PSYCHIATRY & NEUROLOGY

## 2021-07-27 RX ORDER — ATORVASTATIN CALCIUM 40 MG/1
TABLET, FILM COATED ORAL
COMMUNITY
Start: 2021-07-19

## 2021-07-27 ASSESSMENT — ENCOUNTER SYMPTOMS
PHOTOPHOBIA: 0
VOMITING: 0
CHOKING: 0
COLOR CHANGE: 0
NAUSEA: 0
BACK PAIN: 0
SHORTNESS OF BREATH: 0
TROUBLE SWALLOWING: 0

## 2021-07-27 NOTE — PROGRESS NOTES
Subjective:      Patient ID: Margaret Saavedra is a 64 y.o. male who presents today for:  Chief Complaint   Patient presents with    Follow-up     pt still has weakness to left side. Is supposed to be getting craneoplasty done they are unsure of date at this time. Has stopped aspirin for the time until surgery. No Seizures. HPI 59-year-old right-handed female with a history of vascular disease with dysphagia with left hemiparesis with mood disorders with spastic hemiplegia that patient a very large territory MCA infarct on the left. He was seen at [facility] with a massive CVA. She has come a long way since his stroke his feeding tube had been removed. He had developed DVT and continues on anticoagulation. Patient had a hemicraniotomy and requires flap. We had further referred him to [facility] with Dr. Yariel Bee  To have his flap done and was on aspirin which was discontinued never on anticoagulation. He still not walking and has significant stiffness on the left upper and lower extremity.       Past Medical History:   Diagnosis Date    Hyperlipidemia     Hypertension     Osteoarthritis     S/P inguinal herniorrhaphy     left    Stroke ([facility] [address] 2020     Past Surgical History:   Procedure Laterality Date    HERNIA REPAIR Bilateral     [place] COLON CA SCRN NOT HI RSK IND N/A 10/6/2017    COLONOSCOPY performed by Akshat Alvarado MD at [address] [place] [address]       Social History     Socioeconomic History    Marital status:      Spouse name: Not on file    Number of children: Not on file    Years of education: Not on file    Highest education level: Not on file   Occupational History    Not on file   Tobacco Use    Smoking status: Former Smoker     Packs/day: 1.00     Years: 20.00     Pack years: 20.00     Types: Cigarettes     Start date: 0     Quit date: 2020     Years since quittin.6    Smokeless tobacco: Never Used   Vaping Use    Vaping Use: Never used   Substance and Sexual Activity    Alcohol use: Yes     Alcohol/week: 18.0 standard drinks     Types: 18 Cans of beer per week    Drug use: Yes     Types: Marijuana     Comment: 2X/ month    Sexual activity: Not on file     Comment:    Other Topics Concern    Not on file   Social History Narrative    Not on file     Social Determinants of Health     Financial Resource Strain: Low Risk     Difficulty of Paying Living Expenses: Not very hard   Food Insecurity:     Worried About Running Out of Food in the Last Year:     Ran Out of Food in the Last Year:    Transportation Needs:     Lack of Transportation (Medical):  Lack of Transportation (Non-Medical):    Physical Activity:     Days of Exercise per Week:     Minutes of Exercise per Session:    Stress:     Feeling of Stress :    Social Connections:     Frequency of Communication with Friends and Family:     Frequency of Social Gatherings with Friends and Family:     Attends [employer] Services:     Active Member of Clubs or Organizations:     Attends Club or Organization Meetings:     Marital Status:    Intimate Partner Violence:     Fear of Current or Ex-Partner:     Emotionally Abused:     Physically Abused:     Sexually Abused:      No family history on file. No Known Allergies    Current Outpatient Medications   Medication Sig Dispense Refill    atorvastatin (LIPITOR) 40 MG tablet       cetirizine (ZYRTEC) 10 MG tablet Take 1 tablet by mouth daily 30 tablet 11    levETIRAcetam (KEPPRA) 500 MG tablet Take 1 tablet by mouth 2 times daily 60 tablet 5    ferrous sulfate (IRON 325) 325 (65 Fe) MG tablet Take 1 tablet by mouth daily (with breakfast) 90 tablet 1    metoprolol tartrate (LOPRESSOR) 25 MG tablet Take 2 tablets by mouth daily 90 tablet 2    amLODIPine (NORVASC) 10 MG tablet 1 tablet via PEG daily.  90 tablet 4    ofloxacin (OCUFLOX) 0.3 % solution       melatonin 1 MG tablet Take 10 mg by mouth nightly as needed      Handicap Placard MISC by Does not apply route Good for 5 years. Expires 3/10/2026. 1 each 0    gabapentin (NEURONTIN) 100 MG capsule TAKE 1 CAPSULE BY MOUTH THREE TIMES A DAY 90 capsule 5    hydroCHLOROthiazide (HYDRODIURIL) 25 MG tablet Take 1 tablet by mouth every morning (Patient not taking: Reported on 6/10/2021) 30 tablet 0    metoprolol tartrate (LOPRESSOR) 25 MG tablet GIVE 1 TABLET VIA PEG TUBE IN THE MORNING AND AT BEDTIME (Patient not taking: Reported on 7/27/2021) 180 tablet 4    oxyCODONE (ROXICODONE) 5 MG/5ML solution  (Patient not taking: Reported on 6/10/2021)      aspirin 81 MG chewable tablet Take 81 mg by mouth daily (Patient not taking: Reported on 7/27/2021)       No current facility-administered medications for this visit. Review of Systems   Constitutional: Negative for fever. HENT: Negative for ear pain, tinnitus and trouble swallowing. Eyes: Negative for photophobia and visual disturbance. Respiratory: Negative for choking and shortness of breath. Cardiovascular: Negative for chest pain and palpitations. Gastrointestinal: Negative for nausea and vomiting. Musculoskeletal: Positive for gait problem. Negative for back pain, joint swelling, myalgias, neck pain and neck stiffness. Skin: Negative for color change. Allergic/Immunologic: Negative for food allergies. Neurological: Positive for speech difficulty and weakness. Negative for dizziness, tremors, seizures, syncope, facial asymmetry, light-headedness, numbness and headaches. Psychiatric/Behavioral: Negative for behavioral problems, confusion, hallucinations and sleep disturbance. Objective:   /84 (Site: Right Upper Arm, Position: Sitting, Cuff Size: Medium Adult)   Pulse 74     Physical Exam  Vitals reviewed. Eyes:      Pupils: Pupils are equal, round, and reactive to light. Cardiovascular:      Rate and Rhythm: Normal rate and regular rhythm. Heart sounds:  No murmur heard. Pulmonary:      Effort: Pulmonary effort is normal.      Breath sounds: Normal breath sounds. Abdominal:      General: Bowel sounds are normal.   Musculoskeletal:         General: Normal range of motion. Cervical back: Normal range of motion. Skin:     General: Skin is warm. Neurological:      Mental Status: He is alert and oriented to person, place, and time. Cranial Nerves: No cranial nerve deficit. Sensory: No sensory deficit. Motor: No abnormal muscle tone. Coordination: Coordination normal.      Deep Tendon Reflexes: Reflexes are normal and symmetric. Babinski sign absent on the right side. Babinski sign absent on the left side. Comments: Patient is hemiplegic in the left upper and lower extremity increased tone and spasticity. He has speech issues as well. He is nonambulatory and he has dystonia in the lower extremity. Psychiatric:         Mood and Affect: Mood normal.         CT Head WO Contrast    Result Date: 4/30/2021  CT HEAD WO CONTRAST : 4/30/2021 CLINICAL HISTORY: R frontal/parietal swelling s/p craniotomy 12/21 . COMPARISON: 2/13/2021. TECHNIQUE: ROUTINE. All CT scans at this facility use dose modulation, iterative reconstruction, and/or weight based dosing when appropriate to reduce radiation dose to as low as reasonably achievable. FINDINGS: A large old predominantly right frontoparietal craniotomy and underlying encephalomalacia, with mild chronic left-to-right midline shift is again noted. There is no acute intracranial hemorrhage, herniation, hydrocephalus, or evidence of an acute ischemic infarct. A small amount of fluid is present within some mastoid air cells. The visualized paranasal sinuses are clear. NO ACUTE INTRACRANIAL PROCESS IDENTIFIED. CHRONIC POSTSURGICAL AND UNDERLYING ENCEPHALOMALACIA CHANGES.        Lab Results   Component Value Date    WBC 5.4 07/16/2021    WBC 5.1 04/30/2021    RBC 5.24 07/16/2021    RBC 4.64 05/18/2012    HGB 12.0 07/16/2021    HCT 41.4 07/16/2021    MCV 79 07/16/2021    MCH 21.6 04/30/2021    MCHC 29.0 07/16/2021    RDW 17.5 04/30/2021     07/16/2021    MPV 8.9 09/10/2015     Lab Results   Component Value Date     07/16/2021    K 4.4 07/16/2021    K 4.0 04/10/2021     07/16/2021    CO2 24 04/30/2021    BUN 16 04/30/2021    CREATININE 0.76 07/16/2021    GFRAA >60 07/16/2021    LABGLOM >60 07/16/2021    GLUCOSE 63 07/16/2021    PROT 7.7 04/30/2021    LABALBU 3.9 04/30/2021    LABALBU 4.2 05/18/2012    CALCIUM 9.3 07/16/2021    BILITOT <0.2 04/30/2021    ALKPHOS 78 04/30/2021    AST 9 04/30/2021    ALT 8 04/30/2021     Lab Results   Component Value Date    PROTIME 12.8 07/16/2021    INR 1.1 07/16/2021     Lab Results   Component Value Date    PAKADVWX82 586 04/26/2021    FOLATE 15.4 04/26/2021    IRON 30 04/26/2021    TIBC 474 04/26/2021     Lab Results   Component Value Date    TRIG 107 02/09/2019    HDL 41 04/26/2021    LDLCALC 185 04/26/2021     Lab Results   Component Value Date    ETOH <10 02/13/2021     No results found for: LITHIUM, DILFRTOT, VALPROATE    Assessment:       Diagnosis Orders   1. Cerebrovascular accident (CVA) due to embolism of precerebral artery ([address]     2. Left hemiparesis (HCC)     3. Spastic hemiplegia of left nondominant side as late effect of cerebral infarction (HCC)     4. Spasticity     5. Paralysis ([address]     6. Immobility     Large MCA stroke on the right with a left hemiplegia. Patient has a craniotomy open flap. Last seen patient, long way in terms of his affect but his family. Has not improved he is eating now and his PEG tube was discontinued. He was supposed to be on Coumadin for DVT which she has never been on is on aspirin which was held for the open flap. Further referred him to Dr. Kalia Reilly who is seeing him and they are planning a flap to be done soon.   Once this is done we can discontinue the helmet and then will see him for spasticity and may consider botulinum toxin. Patient is likely to have significant deficits even with the craniotomy done and the outcomes have always been equivocal with this procedures though patients do live. We will keep an eye on this and continue to follow I recommended for his sister was a caregiver to keep checking his leg so he does not develop DVT as he is immobile and is a high risk for DVT. Plan:      No orders of the defined types were placed in this encounter. No orders of the defined types were placed in this encounter. No follow-ups on file.       Sharen Kawasaki, MD

## 2021-08-23 ENCOUNTER — TELEPHONE (OUTPATIENT)
Dept: FAMILY MEDICINE CLINIC | Age: 61
End: 2021-08-23

## 2021-08-23 NOTE — TELEPHONE ENCOUNTER
Otto Arguelles calling for patient. Adult Protective Services were called because Pierce's caregiver (his sister) called [address] this morning and was very agitated and throwing things and said she couldn't take care of him anymore. Otto Arguelles wanted KEYSHAWN Cruz to be aware.

## 2021-08-31 ENCOUNTER — TELEPHONE (OUTPATIENT)
Dept: FAMILY MEDICINE CLINIC | Age: 61
End: 2021-08-31

## 2021-08-31 NOTE — TELEPHONE ENCOUNTER
There was an electric company fax sent today. Did we get it? If not, please call Venus Lion [phone] so she can have them fax another one. If done and faxed by 2:00 then they can have it turned on today.

## 2021-08-31 NOTE — TELEPHONE ENCOUNTER
They have sent the [facility] form to the correct fax # 3 times. They are asking you to call [phone]. That is a number just for providers. Please address asap as Panda Kc is Paralyzed and needs the service.

## 2021-09-03 ENCOUNTER — TELEPHONE (OUTPATIENT)
Dept: FAMILY MEDICINE CLINIC | Age: 61
End: 2021-09-03

## 2021-09-03 NOTE — TELEPHONE ENCOUNTER
----- Message from Cedric Landonsteven sent at 9/2/2021  3:36 PM EDT -----  Subject: Referral Request    QUESTIONS   Reason for referral request? patient is requesting the Covid 19 vaccine   Has the physician seen you for this condition before? No   Preferred Specialist (if applicable)? Tierra Cruz  Do you already have an appointment scheduled? No  Additional Information for Provider? patient would like to know if this   visit will require a co pay  ---------------------------------------------------------------------------  --------------  CALL BACK INFO  What is the best way for the office to contact you? OK to leave message on   voicemail  Preferred Call Back Phone Number?  [phone]

## 2021-11-22 PROBLEM — I25.10 CORONARY ARTERY CALCIFICATION SEEN ON CAT SCAN: Status: ACTIVE | Noted: 2021-11-22

## 2021-11-22 PROBLEM — I82.403 ACUTE DEEP VEIN THROMBOSIS (DVT) OF BOTH LOWER EXTREMITIES (HCC): Status: ACTIVE | Noted: 2021-11-22

## 2021-11-22 PROBLEM — R13.19 OTHER DYSPHAGIA: Status: ACTIVE | Noted: 2021-02-02

## 2021-11-22 PROBLEM — I63.9 CEREBROVASCULAR ACCIDENT (CVA) DUE TO EMBOLISM (HCC): Status: ACTIVE | Noted: 2021-03-10

## 2021-11-22 PROBLEM — G06.2 SUBDURAL EMPYEMA: Status: ACTIVE | Noted: 2021-11-22

## 2021-11-22 PROBLEM — R00.0 TACHYCARDIA: Status: ACTIVE | Noted: 2021-11-22

## 2021-11-22 PROBLEM — D64.9 ANEMIA: Status: ACTIVE | Noted: 2021-11-22

## 2021-11-22 PROBLEM — F12.90 MARIJUANA USER: Status: ACTIVE | Noted: 2021-11-22

## 2021-11-22 PROBLEM — G97.82: Status: ACTIVE | Noted: 2021-11-22

## 2021-11-22 PROBLEM — I26.99 PULMONARY EMBOLISM (HCC): Status: ACTIVE | Noted: 2021-11-22

## 2021-11-22 PROBLEM — K59.00 CONSTIPATION: Status: ACTIVE | Noted: 2021-11-22

## 2022-01-18 ENCOUNTER — OFFICE VISIT (OUTPATIENT)
Dept: GERIATRIC MEDICINE | Age: 62
End: 2022-01-18
Payer: COMMERCIAL

## 2022-01-18 VITALS
BODY MASS INDEX: 21.5 KG/M2 | OXYGEN SATURATION: 99 % | TEMPERATURE: 96.9 F | SYSTOLIC BLOOD PRESSURE: 129 MMHG | HEART RATE: 113 BPM | DIASTOLIC BLOOD PRESSURE: 72 MMHG | WEIGHT: 133.2 LBS | RESPIRATION RATE: 16 BRPM

## 2022-01-18 DIAGNOSIS — D50.9 IRON DEFICIENCY ANEMIA, UNSPECIFIED IRON DEFICIENCY ANEMIA TYPE: ICD-10-CM

## 2022-01-18 DIAGNOSIS — G81.94 LEFT HEMIPARESIS (HCC): ICD-10-CM

## 2022-01-18 DIAGNOSIS — R13.11 ORAL PHASE DYSPHAGIA: ICD-10-CM

## 2022-01-18 DIAGNOSIS — M79.2 NEUROPATHIC PAIN: ICD-10-CM

## 2022-01-18 DIAGNOSIS — E55.9 HYPOVITAMINOSIS D: ICD-10-CM

## 2022-01-18 DIAGNOSIS — I69.354 SPASTIC HEMIPLEGIA OF LEFT NONDOMINANT SIDE AS LATE EFFECT OF CEREBRAL INFARCTION (HCC): Primary | ICD-10-CM

## 2022-01-18 DIAGNOSIS — G40.909 SEIZURE DISORDER (HCC): ICD-10-CM

## 2022-01-18 DIAGNOSIS — I10 PRIMARY HYPERTENSION: ICD-10-CM

## 2022-01-18 PROCEDURE — G8484 FLU IMMUNIZE NO ADMIN: HCPCS | Performed by: INTERNAL MEDICINE

## 2022-01-18 PROCEDURE — 99309 SBSQ NF CARE MODERATE MDM 30: CPT | Performed by: INTERNAL MEDICINE

## 2022-01-18 ASSESSMENT — ENCOUNTER SYMPTOMS
WHEEZING: 0
RECTAL PAIN: 0
EYE REDNESS: 0
CHEST TIGHTNESS: 0
BLOOD IN STOOL: 0
PHOTOPHOBIA: 0
ABDOMINAL DISTENTION: 0
COUGH: 0
DIARRHEA: 0
BACK PAIN: 0
SINUS PRESSURE: 0
TROUBLE SWALLOWING: 0
NAUSEA: 0
FACIAL SWELLING: 0
ABDOMINAL PAIN: 0
SINUS PAIN: 0
EYE DISCHARGE: 0
SHORTNESS OF BREATH: 0
EYE PAIN: 0
APNEA: 0
CONSTIPATION: 0
COLOR CHANGE: 0
RHINORRHEA: 0
EYE ITCHING: 0
SORE THROAT: 0
VOICE CHANGE: 0
VOMITING: 0

## 2022-01-18 NOTE — PROGRESS NOTES
Subjective:      Patient ID: Dylan Bradley is a 58 y.o. male Established patient, here for evaluation of the following chief complaint(s):  Chief Complaint   Patient presents with    Hypertension       HPI  70-year-old hypertensive male status postacute CVA with left hemiparesis, associated dysphagia, iron deficiency anemia, seizure disorder, and hypovitaminosis D and hyperlipidemia being seen out of medical necessity for the aforementioned medical problems. History of cerebrovascular accident with associated dysphagia: The patient is receiving Lipitor the patient is receiving a puréed texture regular thin consistency diet. Neuropathic pain: Receiving gabapentin as needed for pain        Iron deficiency anemia: Receiving iron sulfate 325 mg daily        Essential hypertension: Receiving Norvasc 10 mg orally daily Lopressor 25 mg twice daily          Hypovitaminosis D: The patient is receiving vitamin D3 1000 national units daily. Most recent vitamin D level on October 18, 2021 was 33.7. Seizure disorder: The patient is receiving  At present he denies polyuria,  Polydipsia, constitutional, sinus, visual, cardiopulmonary, urologic, gastrointestinal, immunologic/hematologic, musculoskeletal, neurologic,dermatologic, or psychiatric complaints.     Current Outpatient Medications on File Prior to Visit   Medication Sig Dispense Refill    atorvastatin (LIPITOR) 40 MG tablet       Handicap Placard MISC by Does not apply route Good for 5 years EXP: 7/27/2026 1 each 0    cetirizine (ZYRTEC) 10 MG tablet Take 1 tablet by mouth daily 30 tablet 11    gabapentin (NEURONTIN) 100 MG capsule TAKE 1 CAPSULE BY MOUTH THREE TIMES A DAY 90 capsule 5    levETIRAcetam (KEPPRA) 500 MG tablet Take 1 tablet by mouth 2 times daily 60 tablet 5    ferrous sulfate (IRON 325) 325 (65 Fe) MG tablet Take 1 tablet by mouth daily (with breakfast) 90 tablet 1    metoprolol tartrate (LOPRESSOR) 25 MG tablet Take 2 tablets by mouth daily 90 tablet 2    hydroCHLOROthiazide (HYDRODIURIL) 25 MG tablet Take 1 tablet by mouth every morning (Patient not taking: Reported on 6/10/2021) 30 tablet 0    metoprolol tartrate (LOPRESSOR) 25 MG tablet GIVE 1 TABLET VIA PEG TUBE IN THE MORNING AND AT BEDTIME (Patient not taking: Reported on 2021) 180 tablet 4    amLODIPine (NORVASC) 10 MG tablet 1 tablet via PEG daily. 90 tablet 4    ofloxacin (OCUFLOX) 0.3 % solution       oxyCODONE (ROXICODONE) 5 MG/5ML solution  (Patient not taking: Reported on 6/10/2021)      aspirin 81 MG chewable tablet Take 81 mg by mouth daily (Patient not taking: Reported on 2021)      melatonin 1 MG tablet Take 10 mg by mouth nightly as needed      Handicap Placard MISC by Does not apply route Good for 5 years. Expires 3/10/2026. 1 each 0     No current facility-administered medications on file prior to visit. Patient has no known allergies. Past Medical History:   Diagnosis Date    Hyperlipidemia     Hypertension     Osteoarthritis     S/P inguinal herniorrhaphy     left    Stroke ([facility] [address] 2020     Past Surgical History:   Procedure Laterality Date    HERNIA REPAIR Bilateral     [place] COLON CA SCRN NOT HI RSK IND N/A 10/6/2017    COLONOSCOPY performed by Antelmo Del Rio MD at [address] [place] [address]       Social History     Socioeconomic History    Marital status:      Spouse name: Not on file    Number of children: Not on file    Years of education: Not on file    Highest education level: Not on file   Occupational History    Not on file   Tobacco Use    Smoking status: Former Smoker     Packs/day: 1.00     Years: 20.00     Pack years: 20.00     Types: Cigarettes     Start date: [address]     Quit date: 2020     Years since quittin.0    Smokeless tobacco: Never Used   Vaping Use    Vaping Use: Never used   Substance and Sexual Activity    Alcohol use:  Yes     Alcohol/week: 18.0 standard drinks     Types: 18 Cans of beer per week    Drug use: Yes     Types: Marijuana Mariajose Heys)     Comment: 2X/ month    Sexual activity: Not on file     Comment:    Other Topics Concern    Not on file   Social History Narrative    Not on file     Social Determinants of Health     Financial Resource Strain: Low Risk     Difficulty of Paying Living Expenses: Not very hard   Food Insecurity:     Worried About Running Out of Food in the Last Year: Not on file    Jack of Food in the Last Year: Not on file   Transportation Needs:     Lack of Transportation (Medical): Not on file    Lack of Transportation (Non-Medical): Not on file   Physical Activity:     Days of Exercise per Week: Not on file    Minutes of Exercise per Session: Not on file   Stress:     Feeling of Stress : Not on file   Social Connections:     Frequency of Communication with Friends and Family: Not on file    Frequency of Social Gatherings with Friends and Family: Not on file    Attends Oriental orthodox Services: Not on file    Active Member of [address] [place] [address] or Organizations: Not on file    Attends Club or Organization Meetings: Not on file    Marital Status: Not on file   Intimate Partner Violence:     Fear of Current or Ex-Partner: Not on file    Emotionally Abused: Not on file    Physically Abused: Not on file    Sexually Abused: Not on file   Housing Stability:     Unable to Pay for Housing in the Last Year: Not on file    Number of [place] in the Last Year: Not on file    Unstable Housing in the Last Year: Not on file     No family history on file. Review of Systems   Constitutional: Negative for chills, diaphoresis, fatigue and fever. HENT: Negative for congestion, dental problem, drooling, ear discharge, ear pain, facial swelling, hearing loss, mouth sores, nosebleeds, postnasal drip, rhinorrhea, sinus pressure, sinus pain, sneezing, sore throat, tinnitus, trouble swallowing and voice change.     Eyes: Negative for photophobia, pain, discharge, redness, itching and visual disturbance. Respiratory: Negative for apnea, cough, chest tightness, shortness of breath and wheezing. Cardiovascular: Negative for chest pain, palpitations and leg swelling. Gastrointestinal: Negative for abdominal distention, abdominal pain, blood in stool, constipation, diarrhea, nausea, rectal pain and vomiting. Endocrine: Negative for cold intolerance, heat intolerance, polydipsia, polyphagia and polyuria. Genitourinary: Negative for decreased urine volume, difficulty urinating, dysuria, flank pain, frequency, genital sores, hematuria and urgency. Musculoskeletal: Negative for arthralgias, back pain, gait problem, joint swelling, myalgias, neck pain and neck stiffness. Skin: Negative for color change, rash and wound. Allergic/Immunologic: Negative for environmental allergies and food allergies. Neurological: Negative for dizziness, tremors, seizures, syncope, facial asymmetry, speech difficulty, weakness, light-headedness, numbness and headaches. Hematological: Negative for adenopathy. Does not bruise/bleed easily. Psychiatric/Behavioral: Negative for agitation, confusion, decreased concentration, hallucinations, self-injury, sleep disturbance and suicidal ideas. The patient is not nervous/anxious. Objective:   /72   Pulse 113   Temp 96.9 °F (36.1 °C)   Resp 16   Wt 133 lb 3.2 oz (60.4 kg)   SpO2 99%   BMI 21.50 kg/m²     Physical Exam  Constitutional:       General: He is not in acute distress. Appearance: He is well-developed. HENT:      Head: Normocephalic. Right Ear: External ear normal.      Left Ear: External ear normal.   Eyes:      Conjunctiva/sclera: Conjunctivae normal.   Neck:      Vascular: No JVD. Trachea: No tracheal deviation. Cardiovascular:      Rate and Rhythm: Normal rate and regular rhythm. Heart sounds: Normal heart sounds.    Pulmonary:      Effort: Pulmonary effort is normal. No respiratory distress. Breath sounds: Normal breath sounds. No wheezing or rales. Chest:      Chest wall: No tenderness. Abdominal:      General: Bowel sounds are normal. There is no distension. Palpations: Abdomen is soft. There is no mass. Tenderness: There is no abdominal tenderness. There is no guarding or rebound. Musculoskeletal:         General: No tenderness or deformity. Cervical back: Neck supple. Skin:     General: Skin is warm and dry. Coloration: Skin is not pale. Findings: No erythema or rash. Neurological:      Mental Status: He is alert and oriented to person, place, and time. Motor: No abnormal muscle tone. Comments: Left hemiparesis   Psychiatric:         Thought Content:  Thought content normal.         Judgment: Judgment normal.             Component      Latest Ref Rng & Units 10/18/2021 10/18/2021 10/18/2021           7:45 AM  7:45 AM  7:45 AM   Sodium      135 - 144 mEq/L  139    Potassium      3.4 - 4.9 mEq/L  4.2    Chloride      95 - 107 mEq/L  103    CO2      20 - 31 mEq/L  23    Anion Gap      9 - 15 mEq/L  13    Glucose      70 - 99 mg/dL  99    BUN      8 - 23 mg/dL  11    Creatinine      0.70 - 1.20 mg/dL  0.76    GFR Non-      >60  >60.0    GFR       >60  >60.0    CALCIUM, SERUM, [id number]      8.5 - 9.9 mg/dL  9.1    Total Protein      6.3 - 8.0 g/dL  7.4    Albumin      3.5 - 4.6 g/dL  4.0    Bilirubin      0.2 - 0.7 mg/dL  0.3    Alk Phos      35 - 104 U/L  90    ALT      0 - 41 U/L  20    AST      0 - 40 U/L  14    Globulin      2.3 - 3.5 g/dL  3.4    WBC      4.8 - 10.8 K/uL   5.2   RBC      4.70 - 6.10 M/uL   4.59 (L)   Hemoglobin Quant      14.0 - 18.0 g/dL   11.6 (L)   Hematocrit      42.0 - 52.0 %   36.9 (L)   MCV      80.0 - 100.0 fL   80.3   MCH      27.0 - 31.3 pg   25.4 (L)   MCHC      33.0 - 37.0 %   31.6 (L)   RDW      11.5 - 14.5 %   16.3 (H)   Platelet Count      098 - 400 K/uL 454 (H)   Cholesterol, Total      0 - 199 mg/dL 122     Triglycerides      0 - 150 mg/dL 54     HDL Cholesterol      40 - 59 mg/dL 33 (L)     LDL Calculated      0 - 129 mg/dL 78     SARS-CoV-2, PCR      Not Detect      Specimen Source            TSH      0.440 - 3.860 uIU/mL      Vit D, 25-Hydroxy      30.0 - 100.0 ng/mL            Assessment:       Diagnosis Orders   1. Spastic hemiplegia of left nondominant side as late effect of cerebral infarction ([facility] [address]     2. Left hemiparesis ([facility] [address]     3. Primary hypertension     4. Oral phase dysphagia     5. Neuropathic pain     6. Hypovitaminosis D     7. Seizure disorder ([facility] [address]     8. Iron deficiency anemia, unspecified iron deficiency anemia type          No results found for: LIPIDPAN, BMP, CMP, CBC, CBCAUTODIF  Plan:      Diagnoses and all orders for this visit:    Spastic hemiplegia of left nondominant side as late effect of cerebral infarction (HCC)/abdominal paresis/oral phase dysphagia-optimizing blood pressure, continue Lipitor. Continue puréed diet        Primary hypertension-continue Norvasc 10 mg orally daily and metoprolol 25 mg twice daily        Neuropathic pain-continue Neurontin    Hypovitaminosis D-continue oral vitamin D replacement    Seizure disorder (HCC)-continue Keppra    Iron deficiency anemia, unspecified iron deficiency anemia type-continue iron sulfate         Return in about 1 week (around 1/25/2022). On this date 01/18/22 I have spent 30 minutes reviewing previous notes, test results and face to face with the patient discussing the diagnosis and importance of compliance with the treatment plan. Heladio Paz MD    Please note, this report has been partially produced using speech recognition software  and may cause  and /or contain errors related to that system including grammar, punctuation and spelling as well as words and phrases that may seem inappropriate.  If there are questions or concerns please feel free to contact me to clarify.

## 2022-02-21 ENCOUNTER — OFFICE VISIT (OUTPATIENT)
Dept: GERIATRIC MEDICINE | Age: 62
End: 2022-02-21
Payer: COMMERCIAL

## 2022-02-21 VITALS
TEMPERATURE: 97.4 F | RESPIRATION RATE: 16 BRPM | OXYGEN SATURATION: 99 % | BODY MASS INDEX: 21.63 KG/M2 | WEIGHT: 134 LBS | SYSTOLIC BLOOD PRESSURE: 129 MMHG | HEART RATE: 113 BPM | DIASTOLIC BLOOD PRESSURE: 72 MMHG

## 2022-02-21 DIAGNOSIS — R13.11 ORAL PHASE DYSPHAGIA: ICD-10-CM

## 2022-02-21 DIAGNOSIS — D50.9 IRON DEFICIENCY ANEMIA, UNSPECIFIED IRON DEFICIENCY ANEMIA TYPE: ICD-10-CM

## 2022-02-21 DIAGNOSIS — G40.909 SEIZURE DISORDER (HCC): ICD-10-CM

## 2022-02-21 DIAGNOSIS — R13.10 DYSPHAGIA, UNSPECIFIED TYPE: ICD-10-CM

## 2022-02-21 DIAGNOSIS — E55.9 HYPOVITAMINOSIS D: ICD-10-CM

## 2022-02-21 DIAGNOSIS — I69.354 SPASTIC HEMIPLEGIA OF LEFT NONDOMINANT SIDE AS LATE EFFECT OF CEREBRAL INFARCTION (HCC): ICD-10-CM

## 2022-02-21 DIAGNOSIS — I10 PRIMARY HYPERTENSION: ICD-10-CM

## 2022-02-21 DIAGNOSIS — I10 ESSENTIAL HYPERTENSION: ICD-10-CM

## 2022-02-21 DIAGNOSIS — R22.42 LOCALIZED SWELLING OF LEFT LOWER EXTREMITY: ICD-10-CM

## 2022-02-21 DIAGNOSIS — E78.5 HYPERLIPIDEMIA, UNSPECIFIED HYPERLIPIDEMIA TYPE: ICD-10-CM

## 2022-02-21 DIAGNOSIS — M79.2 NEUROPATHIC PAIN: ICD-10-CM

## 2022-02-21 DIAGNOSIS — Z86.73 HISTORY OF CEREBROVASCULAR ACCIDENT: ICD-10-CM

## 2022-02-21 DIAGNOSIS — G81.94 LEFT HEMIPARESIS (HCC): Primary | ICD-10-CM

## 2022-02-21 PROCEDURE — G8484 FLU IMMUNIZE NO ADMIN: HCPCS | Performed by: INTERNAL MEDICINE

## 2022-02-21 PROCEDURE — 99309 SBSQ NF CARE MODERATE MDM 30: CPT | Performed by: INTERNAL MEDICINE

## 2022-02-21 ASSESSMENT — ENCOUNTER SYMPTOMS
SINUS PRESSURE: 0
EYE ITCHING: 0
TROUBLE SWALLOWING: 0
EYE REDNESS: 0
WHEEZING: 0
APNEA: 0
SORE THROAT: 0
BACK PAIN: 0
RHINORRHEA: 0
RECTAL PAIN: 0
DIARRHEA: 0
COUGH: 0
SINUS PAIN: 0
CHEST TIGHTNESS: 0
EYE PAIN: 0
ABDOMINAL DISTENTION: 0
FACIAL SWELLING: 0
VOMITING: 0
VOICE CHANGE: 0
COLOR CHANGE: 0
BLOOD IN STOOL: 0
SHORTNESS OF BREATH: 0
CONSTIPATION: 0
ABDOMINAL PAIN: 0
EYE DISCHARGE: 0
PHOTOPHOBIA: 0
NAUSEA: 0

## 2022-02-22 NOTE — PROGRESS NOTES
Subjective:      Patient ID: Beto Santoyo is a 58 y.o. male Established patient, here for evaluation of the following chief complaint(s):  Chief Complaint   Patient presents with    Other     Left lower extremity edema       HPI  44-year-old hypertensive male status postacute CVA with left hemiparesis, associated dysphagia, iron deficiency anemia, seizure disorder, and hypovitaminosis D and hyperlipidemia being seen out of medical necessity for left lower extremity edema      Left lower extremity edema: The patient states that over the course of the past 24 hours he has noted worsening left calf pain and associated left lower extremity edema. .      History of cerebrovascular accident with associated dysphagia: The patient is receiving Lipitor the patient is receiving a puréed texture regular thin consistency diet. Neuropathic pain: Receiving gabapentin as needed for pain        Iron deficiency anemia: Receiving iron sulfate 325 mg daily        Essential hypertension: Receiving Norvasc 10 mg orally daily Lopressor 25 mg twice daily          Hypovitaminosis D: The patient is receiving vitamin D3 1000 national units daily. Most recent vitamin D level on October 18, 2021 was 33.7. Seizure disorder: The patient is receiving  At present he denies polyuria,  Polydipsia, constitutional, sinus, visual, cardiopulmonary, urologic, gastrointestinal, immunologic/hematologic, musculoskeletal, neurologic,dermatologic, or psychiatric complaints.     Current Outpatient Medications on File Prior to Visit   Medication Sig Dispense Refill    atorvastatin (LIPITOR) 40 MG tablet       Handicap Placard MISC by Does not apply route Good for 5 years EXP: 7/27/2026 1 each 0    cetirizine (ZYRTEC) 10 MG tablet Take 1 tablet by mouth daily 30 tablet 11    gabapentin (NEURONTIN) 100 MG capsule TAKE 1 CAPSULE BY MOUTH THREE TIMES A DAY 90 capsule 5    levETIRAcetam (KEPPRA) 500 MG tablet Take 1 tablet by mouth 2 times daily 60 tablet 5    ferrous sulfate (IRON 325) 325 (65 Fe) MG tablet Take 1 tablet by mouth daily (with breakfast) 90 tablet 1    metoprolol tartrate (LOPRESSOR) 25 MG tablet Take 2 tablets by mouth daily 90 tablet 2    hydroCHLOROthiazide (HYDRODIURIL) 25 MG tablet Take 1 tablet by mouth every morning (Patient not taking: Reported on 6/10/2021) 30 tablet 0    metoprolol tartrate (LOPRESSOR) 25 MG tablet GIVE 1 TABLET VIA PEG TUBE IN THE MORNING AND AT BEDTIME (Patient not taking: Reported on 7/27/2021) 180 tablet 4    amLODIPine (NORVASC) 10 MG tablet 1 tablet via PEG daily. 90 tablet 4    ofloxacin (OCUFLOX) 0.3 % solution       oxyCODONE (ROXICODONE) 5 MG/5ML solution  (Patient not taking: Reported on 6/10/2021)      aspirin 81 MG chewable tablet Take 81 mg by mouth daily (Patient not taking: Reported on 7/27/2021)      melatonin 1 MG tablet Take 10 mg by mouth nightly as needed      Handicap Placard MISC by Does not apply route Good for 5 years. Expires 3/10/2026. 1 each 0     No current facility-administered medications on file prior to visit. Patient has no known allergies.   Past Medical History:   Diagnosis Date    Hyperlipidemia     Hypertension     Osteoarthritis     S/P inguinal herniorrhaphy     left    Stroke ([facility] [address] 12/20/2020     Past Surgical History:   Procedure Laterality Date    HERNIA REPAIR Bilateral     TX COLON CA SCRN NOT HI RSK IND N/A 10/6/2017    COLONOSCOPY performed by Felipe Caceres MD at [address] [place] [address]       Social History     Socioeconomic History    Marital status:      Spouse name: Not on file    Number of children: Not on file    Years of education: Not on file    Highest education level: Not on file   Occupational History    Not on file   Tobacco Use    Smoking status: Former Smoker     Packs/day: 1.00     Years: 20.00     Pack years: 20.00     Types: Cigarettes     Start date: 0     Quit date: 12/20/2020 Years since quittin.1    Smokeless tobacco: Never Used   Vaping Use    Vaping Use: Never used   Substance and Sexual Activity    Alcohol use: Yes     Alcohol/week: 18.0 standard drinks     Types: 18 Cans of beer per week    Drug use: Yes     Types: Marijuana [place])     Comment: 2X/ month    Sexual activity: Not on file     Comment:    Other Topics Concern    Not on file   Social History Narrative    Not on file     Social Determinants of Health     Financial Resource Strain: Low Risk     Difficulty of Paying Living Expenses: Not very hard   Food Insecurity:     Worried About Running Out of Food in the Last Year: Not on file    Jack of Food in the Last Year: Not on file   Transportation Needs:     Lack of Transportation (Medical): Not on file    Lack of Transportation (Non-Medical): Not on file   Physical Activity:     Days of Exercise per Week: Not on file    Minutes of Exercise per Session: Not on file   Stress:     Feeling of Stress : Not on file   Social Connections:     Frequency of Communication with Friends and Family: Not on file    Frequency of Social Gatherings with Friends and Family: Not on file    Attends Christian Services: Not on file    Active Member of [address] [place] [address] or Organizations: Not on file    Attends Club or Organization Meetings: Not on file    Marital Status: Not on file   Intimate Partner Violence:     Fear of Current or Ex-Partner: Not on file    Emotionally Abused: Not on file    Physically Abused: Not on file    Sexually Abused: Not on file   Housing Stability:     Unable to Pay for Housing in the Last Year: Not on file    Number of [place] in the Last Year: Not on file    Unstable Housing in the Last Year: Not on file     No family history on file. Review of Systems   Constitutional: Negative for chills, diaphoresis, fatigue and fever.    HENT: Negative for congestion, dental problem, drooling, ear discharge, ear pain, facial swelling, hearing loss, mouth sores, nosebleeds, postnasal drip, rhinorrhea, sinus pressure, sinus pain, sneezing, sore throat, tinnitus, trouble swallowing and voice change. Eyes: Negative for photophobia, pain, discharge, redness, itching and visual disturbance. Respiratory: Negative for apnea, cough, chest tightness, shortness of breath and wheezing. Cardiovascular: Negative for chest pain, palpitations and leg swelling. Gastrointestinal: Negative for abdominal distention, abdominal pain, blood in stool, constipation, diarrhea, nausea, rectal pain and vomiting. Endocrine: Negative for cold intolerance, heat intolerance, polydipsia, polyphagia and polyuria. Genitourinary: Negative for decreased urine volume, difficulty urinating, dysuria, flank pain, frequency, genital sores, hematuria and urgency. Musculoskeletal: Negative for arthralgias, back pain, gait problem, joint swelling, myalgias, neck pain and neck stiffness. Skin: Negative for color change, rash and wound. Allergic/Immunologic: Negative for environmental allergies and food allergies. Neurological: Negative for dizziness, tremors, seizures, syncope, facial asymmetry, speech difficulty, weakness, light-headedness, numbness and headaches. Hematological: Negative for adenopathy. Does not bruise/bleed easily. Psychiatric/Behavioral: Negative for agitation, confusion, decreased concentration, hallucinations, self-injury, sleep disturbance and suicidal ideas. The patient is not nervous/anxious. Objective:   /72   Pulse 113   Temp 97.4 °F (36.3 °C)   Resp 16   Wt 134 lb (60.8 kg)   SpO2 99%   BMI 21.63 kg/m²     Physical Exam  Constitutional:       General: He is not in acute distress. Appearance: He is well-developed. Comments: Lying comfortably in bed   HENT:      Head: Normocephalic.       Right Ear: External ear normal.      Left Ear: External ear normal.   Eyes:      Conjunctiva/sclera: Conjunctivae normal. Neck:      Vascular: No JVD. Trachea: No tracheal deviation. Cardiovascular:      Rate and Rhythm: Normal rate and regular rhythm. Heart sounds: Normal heart sounds. Pulmonary:      Effort: Pulmonary effort is normal. No respiratory distress. Breath sounds: Normal breath sounds. No wheezing or rales. Chest:      Chest wall: No tenderness. Abdominal:      General: Bowel sounds are normal. There is no distension. Palpations: Abdomen is soft. There is no mass. Tenderness: There is no abdominal tenderness. There is no guarding or rebound. Musculoskeletal:         General: No tenderness or deformity. Cervical back: Neck supple. Skin:     General: Skin is warm and dry. Coloration: Skin is not pale. Findings: No erythema or rash. Neurological:      Mental Status: He is alert and oriented to person, place, and time. Motor: No abnormal muscle tone. Comments: Left hemiparesis   Psychiatric:         Thought Content:  Thought content normal.         Judgment: Judgment normal.             Component      Latest Ref Rng & Units 10/18/2021 10/18/2021 10/18/2021           7:45 AM  7:45 AM  7:45 AM   Sodium      135 - 144 mEq/L  139    Potassium      3.4 - 4.9 mEq/L  4.2    Chloride      95 - 107 mEq/L  103    CO2      20 - 31 mEq/L  23    Anion Gap      9 - 15 mEq/L  13    Glucose      70 - 99 mg/dL  99    BUN      8 - 23 mg/dL  11    Creatinine      0.70 - 1.20 mg/dL  0.76    GFR Non-      >60  >60.0    GFR       >60  >60.0    CALCIUM, SERUM, 136860      8.5 - 9.9 mg/dL  9.1    Total Protein      6.3 - 8.0 g/dL  7.4    Albumin      3.5 - 4.6 g/dL  4.0    Bilirubin      0.2 - 0.7 mg/dL  0.3    Alk Phos      35 - 104 U/L  90    ALT      0 - 41 U/L  20    AST      0 - 40 U/L  14    Globulin      2.3 - 3.5 g/dL  3.4    WBC      4.8 - 10.8 K/uL   5.2   RBC      4.70 - 6.10 M/uL   4.59 (L)   Hemoglobin Quant      14.0 - 18.0 g/dL   11.6 (L) Hematocrit      42.0 - 52.0 %   36.9 (L)   MCV      80.0 - 100.0 fL   80.3   MCH      27.0 - 31.3 pg   25.4 (L)   MCHC      33.0 - 37.0 %   31.6 (L)   RDW      11.5 - 14.5 %   16.3 (H)   Platelet Count      297 - 400 K/uL   454 (H)   Cholesterol, Total      0 - 199 mg/dL 122     Triglycerides      0 - 150 mg/dL 54     HDL Cholesterol      40 - 59 mg/dL 33 (L)     LDL Calculated      0 - 129 mg/dL 78     SARS-CoV-2, PCR      Not Detect      Specimen Source            TSH      0.440 - 3.860 uIU/mL      Vit D, 25-Hydroxy      30.0 - 100.0 ng/mL            Assessment:       Diagnosis Orders   1. Left hemiparesis (HCC)     2. Spastic hemiplegia of left nondominant side as late effect of cerebral infarction ([facility] [address]     3. Localized swelling of left lower extremity     4. Primary hypertension     5. Oral phase dysphagia     6. Neuropathic pain     7. Hypovitaminosis D     8. Seizure disorder ([facility] [address]     9. Iron deficiency anemia, unspecified iron deficiency anemia type     10. Hyperlipidemia, unspecified hyperlipidemia type     11. Essential hypertension     12. History of cerebrovascular accident     15. Dysphagia, unspecified type          No results found for: LIPIDPAN, BMP, CMP, CBC, CBCAUTODIF  Plan:      Diagnoses and all orders for this visit:  Left lower extremity edema: Rule out deep vein thrombosis by ultrasound of the left lower extremity        Spastic hemiplegia of left nondominant side as late effect of cerebral infarction (HCC)/abdominal paresis/oral phase dysphagia-optimizing blood pressure, continue Lipitor.   Continue puréed diet        Primary hypertension-continue Norvasc 10 mg orally daily and metoprolol 25 mg twice daily        Neuropathic pain-continue Neurontin        Hypovitaminosis D-continue oral vitamin D replacement        Seizure disorder (HCC)-continue Keppra        Iron deficiency anemia, unspecified iron deficiency anemia type-continue iron sulfate         Return in about 1 month (around 3/21/2022). On this date 02/21/22 I have spent 30 minutes reviewing previous notes, test results and face to face with the patient discussing the diagnosis and importance of compliance with the treatment plan. Unique Venegas MD    Please note, this report has been partially produced using speech recognition software  and may cause  and /or contain errors related to that system including grammar, punctuation and spelling as well as words and phrases that may seem inappropriate. If there are questions or concerns please feel free to contact me to clarify.

## 2022-02-23 LAB
ALBUMIN SERPL-MCNC: 4.3 G/DL (ref 3.5–4.6)
ALP BLD-CCNC: 93 U/L (ref 35–104)
ALT SERPL-CCNC: 14 U/L (ref 0–41)
ANION GAP SERPL CALCULATED.3IONS-SCNC: 13 MEQ/L (ref 9–15)
AST SERPL-CCNC: 12 U/L (ref 0–40)
BASOPHILS ABSOLUTE: 0 K/UL (ref 0–0.2)
BASOPHILS RELATIVE PERCENT: 0.9 %
BILIRUB SERPL-MCNC: <0.2 MG/DL (ref 0.2–0.7)
BUN BLDV-MCNC: 9 MG/DL (ref 8–23)
CALCIUM SERPL-MCNC: 9.5 MG/DL (ref 8.5–9.9)
CHLORIDE BLD-SCNC: 105 MEQ/L (ref 95–107)
CO2: 24 MEQ/L (ref 20–31)
CREAT SERPL-MCNC: 0.79 MG/DL (ref 0.7–1.2)
EOSINOPHILS ABSOLUTE: 0.3 K/UL (ref 0–0.7)
EOSINOPHILS RELATIVE PERCENT: 5.2 %
GFR AFRICAN AMERICAN: >60
GFR NON-AFRICAN AMERICAN: >60
GLOBULIN: 4 G/DL (ref 2.3–3.5)
GLUCOSE BLD-MCNC: 93 MG/DL (ref 70–99)
HCT VFR BLD CALC: 37.9 % (ref 42–52)
HEMOGLOBIN: 11.9 G/DL (ref 14–18)
LYMPHOCYTES ABSOLUTE: 1.5 K/UL (ref 1–4.8)
LYMPHOCYTES RELATIVE PERCENT: 29.3 %
MCH RBC QN AUTO: 25.7 PG (ref 27–31.3)
MCHC RBC AUTO-ENTMCNC: 31.4 % (ref 33–37)
MCV RBC AUTO: 81.8 FL (ref 80–100)
MONOCYTES ABSOLUTE: 0.4 K/UL (ref 0.2–0.8)
MONOCYTES RELATIVE PERCENT: 8.2 %
NEUTROPHILS ABSOLUTE: 2.9 K/UL (ref 1.4–6.5)
NEUTROPHILS RELATIVE PERCENT: 56.4 %
PDW BLD-RTO: 16.5 % (ref 11.5–14.5)
PLATELET # BLD: 294 K/UL (ref 130–400)
POTASSIUM SERPL-SCNC: 4.3 MEQ/L (ref 3.4–4.9)
RBC # BLD: 4.63 M/UL (ref 4.7–6.1)
SODIUM BLD-SCNC: 142 MEQ/L (ref 135–144)
TOTAL PROTEIN: 8.3 G/DL (ref 6.3–8)
WBC # BLD: 5.1 K/UL (ref 4.8–10.8)

## 2022-03-10 LAB
BASOPHILS ABSOLUTE: 0 K/UL (ref 0–0.2)
BASOPHILS RELATIVE PERCENT: 0.7 %
EOSINOPHILS ABSOLUTE: 0.2 K/UL (ref 0–0.7)
EOSINOPHILS RELATIVE PERCENT: 3 %
HCT VFR BLD CALC: 33.7 % (ref 42–52)
HEMOGLOBIN: 10.6 G/DL (ref 14–18)
LYMPHOCYTES ABSOLUTE: 1.5 K/UL (ref 1–4.8)
LYMPHOCYTES RELATIVE PERCENT: 25.3 %
MCH RBC QN AUTO: 25.4 PG (ref 27–31.3)
MCHC RBC AUTO-ENTMCNC: 31.6 % (ref 33–37)
MCV RBC AUTO: 80.5 FL (ref 80–100)
MONOCYTES ABSOLUTE: 0.7 K/UL (ref 0.2–0.8)
MONOCYTES RELATIVE PERCENT: 11.6 %
NEUTROPHILS ABSOLUTE: 3.6 K/UL (ref 1.4–6.5)
NEUTROPHILS RELATIVE PERCENT: 59.4 %
PDW BLD-RTO: 16.4 % (ref 11.5–14.5)
PLATELET # BLD: 301 K/UL (ref 130–400)
RBC # BLD: 4.19 M/UL (ref 4.7–6.1)
WBC # BLD: 6 K/UL (ref 4.8–10.8)

## 2022-03-11 ENCOUNTER — OFFICE VISIT (OUTPATIENT)
Dept: GERIATRIC MEDICINE | Age: 62
End: 2022-03-11
Payer: OTHER MISCELLANEOUS

## 2022-03-11 DIAGNOSIS — R13.11 ORAL PHASE DYSPHAGIA: ICD-10-CM

## 2022-03-11 DIAGNOSIS — E55.9 HYPOVITAMINOSIS D: ICD-10-CM

## 2022-03-11 DIAGNOSIS — G40.909 SEIZURE DISORDER (HCC): ICD-10-CM

## 2022-03-11 DIAGNOSIS — G81.94 LEFT HEMIPARESIS (HCC): ICD-10-CM

## 2022-03-11 DIAGNOSIS — I10 PRIMARY HYPERTENSION: ICD-10-CM

## 2022-03-11 DIAGNOSIS — M79.2 NEUROPATHIC PAIN: ICD-10-CM

## 2022-03-11 DIAGNOSIS — I69.354 SPASTIC HEMIPLEGIA OF LEFT NONDOMINANT SIDE AS LATE EFFECT OF CEREBRAL INFARCTION (HCC): ICD-10-CM

## 2022-03-11 DIAGNOSIS — D50.9 IRON DEFICIENCY ANEMIA, UNSPECIFIED IRON DEFICIENCY ANEMIA TYPE: ICD-10-CM

## 2022-03-11 DIAGNOSIS — E78.5 HYPERLIPIDEMIA, UNSPECIFIED HYPERLIPIDEMIA TYPE: ICD-10-CM

## 2022-03-11 DIAGNOSIS — M62.838 MUSCLE SPASM: Primary | ICD-10-CM

## 2022-03-11 LAB
ANION GAP SERPL CALCULATED.3IONS-SCNC: 15 MEQ/L (ref 9–15)
BUN BLDV-MCNC: 10 MG/DL (ref 8–23)
CALCIUM SERPL-MCNC: 9.4 MG/DL (ref 8.5–9.9)
CHLORIDE BLD-SCNC: 106 MEQ/L (ref 95–107)
CO2: 21 MEQ/L (ref 20–31)
CREAT SERPL-MCNC: 0.76 MG/DL (ref 0.7–1.2)
GFR AFRICAN AMERICAN: >60
GFR NON-AFRICAN AMERICAN: >60
GLUCOSE BLD-MCNC: 110 MG/DL (ref 70–99)
MAGNESIUM: 2.1 MG/DL (ref 1.7–2.4)
POTASSIUM SERPL-SCNC: 4.3 MEQ/L (ref 3.4–4.9)
SODIUM BLD-SCNC: 142 MEQ/L (ref 135–144)

## 2022-03-11 PROCEDURE — 99309 SBSQ NF CARE MODERATE MDM 30: CPT | Performed by: INTERNAL MEDICINE

## 2022-03-15 ASSESSMENT — ENCOUNTER SYMPTOMS
SINUS PAIN: 0
CHEST TIGHTNESS: 0
SINUS PRESSURE: 0
PHOTOPHOBIA: 0
RECTAL PAIN: 0
NAUSEA: 0
EYE PAIN: 0
TROUBLE SWALLOWING: 0
EYE DISCHARGE: 0
WHEEZING: 0
RHINORRHEA: 0
ABDOMINAL PAIN: 0
COUGH: 0
VOMITING: 0
EYE REDNESS: 0
CONSTIPATION: 0
BLOOD IN STOOL: 0
SORE THROAT: 0
SHORTNESS OF BREATH: 0
FACIAL SWELLING: 0
ABDOMINAL DISTENTION: 0
EYE ITCHING: 0
COLOR CHANGE: 0
DIARRHEA: 0
APNEA: 0
BACK PAIN: 0
VOICE CHANGE: 0

## 2022-03-15 NOTE — PROGRESS NOTES
Subjective:      Patient ID: Otoniel Gutierrez is a 58 y.o. male Established patient, here for evaluation of the following chief complaint(s):  Chief Complaint   Patient presents with    Hypertension    Other     Muscle spasms       HPI  60-year-old hypertensive male status postacute CVA with left hemiparesis, associated dysphagia, iron deficiency anemia, seizure disorder, and hypovitaminosis D and hyperlipidemia being seen out of medical necessity for left lower extremity edema    Muscle spasms: The patient reports experiencing muscle spasms      Left lower extremity edema: The patient states that over the course of the past 24 hours he has noted worsening left calf pain and associated left lower extremity edema. .      History of cerebrovascular accident with associated dysphagia: The patient is receiving Lipitor the patient is receiving a puréed texture regular thin consistency diet. Neuropathic pain: Receiving gabapentin as needed for pain        Iron deficiency anemia: Receiving iron sulfate 325 mg daily        Essential hypertension: Receiving Norvasc 10 mg orally daily Lopressor 25 mg twice daily          Hypovitaminosis D: The patient is receiving vitamin D3 1000 national units daily. Most recent vitamin D level on October 18, 2021 was 33.7. Seizure disorder: The patient is receiving  At present he denies polyuria,  Polydipsia, constitutional, sinus, visual, cardiopulmonary, urologic, gastrointestinal, immunologic/hematologic, additional musculoskeletal, neurologic,dermatologic, or psychiatric complaints.     Current Outpatient Medications on File Prior to Visit   Medication Sig Dispense Refill    atorvastatin (LIPITOR) 40 MG tablet       Handicap Placard MISC by Does not apply route Good for 5 years EXP: 7/27/2026 1 each 0    cetirizine (ZYRTEC) 10 MG tablet Take 1 tablet by mouth daily 30 tablet 11    gabapentin (NEURONTIN) 100 MG capsule TAKE 1 CAPSULE BY MOUTH THREE TIMES A DAY 90 capsule 5  levETIRAcetam (KEPPRA) 500 MG tablet Take 1 tablet by mouth 2 times daily 60 tablet 5    ferrous sulfate (IRON 325) 325 (65 Fe) MG tablet Take 1 tablet by mouth daily (with breakfast) 90 tablet 1    metoprolol tartrate (LOPRESSOR) 25 MG tablet Take 2 tablets by mouth daily 90 tablet 2    hydroCHLOROthiazide (HYDRODIURIL) 25 MG tablet Take 1 tablet by mouth every morning (Patient not taking: Reported on 6/10/2021) 30 tablet 0    metoprolol tartrate (LOPRESSOR) 25 MG tablet GIVE 1 TABLET VIA PEG TUBE IN THE MORNING AND AT BEDTIME (Patient not taking: Reported on 7/27/2021) 180 tablet 4    amLODIPine (NORVASC) 10 MG tablet 1 tablet via PEG daily. 90 tablet 4    ofloxacin (OCUFLOX) 0.3 % solution       oxyCODONE (ROXICODONE) 5 MG/5ML solution  (Patient not taking: Reported on 6/10/2021)      aspirin 81 MG chewable tablet Take 81 mg by mouth daily (Patient not taking: Reported on 7/27/2021)      melatonin 1 MG tablet Take 10 mg by mouth nightly as needed      Handicap Placard MISC by Does not apply route Good for 5 years. Expires 3/10/2026. 1 each 0     No current facility-administered medications on file prior to visit. Patient has no known allergies.   Past Medical History:   Diagnosis Date    Hyperlipidemia     Hypertension     Osteoarthritis     S/P inguinal herniorrhaphy     left    Stroke ([facility] [address] 12/20/2020     Past Surgical History:   Procedure Laterality Date    HERNIA REPAIR Bilateral     AR COLON CA SCRN NOT HI RSK IND N/A 10/6/2017    COLONOSCOPY performed by Delores Clark MD at [address] [place] [address]       Social History     Socioeconomic History    Marital status:      Spouse name: Not on file    Number of children: Not on file    Years of education: Not on file    Highest education level: Not on file   Occupational History    Not on file   Tobacco Use    Smoking status: Former Smoker     Packs/day: 1.00     Years: 20.00     Pack years: 20.00     Types: Cigarettes     Start date: 0     Quit date: 2020     Years since quittin.2    Smokeless tobacco: Never Used   Vaping Use    Vaping Use: Never used   Substance and Sexual Activity    Alcohol use: Yes     Alcohol/week: 18.0 standard drinks     Types: 18 Cans of beer per week    Drug use: Yes     Types: Marijuana [place])     Comment: 2X/ month    Sexual activity: Not on file     Comment:    Other Topics Concern    Not on file   Social History Narrative    Not on file     Social Determinants of Health     Financial Resource Strain:     Difficulty of Paying Living Expenses: Not on file   Food Insecurity:     Worried About Running Out of Food in the Last Year: Not on file    Jack of Food in the Last Year: Not on file   Transportation Needs:     Lack of Transportation (Medical): Not on file    Lack of Transportation (Non-Medical): Not on file   Physical Activity:     Days of Exercise per Week: Not on file    Minutes of Exercise per Session: Not on file   Stress:     Feeling of Stress : Not on file   Social Connections:     Frequency of Communication with Friends and Family: Not on file    Frequency of Social Gatherings with Friends and Family: Not on file    Attends Mandaen Services: Not on file    Active Member of [address] [place] [address] or Organizations: Not on file    Attends Club or Organization Meetings: Not on file    Marital Status: Not on file   Intimate Partner Violence:     Fear of Current or Ex-Partner: Not on file    Emotionally Abused: Not on file    Physically Abused: Not on file    Sexually Abused: Not on file   Housing Stability:     Unable to Pay for Housing in the Last Year: Not on file    Number of [place] in the Last Year: Not on file    Unstable Housing in the Last Year: Not on file     No family history on file. Review of Systems   Constitutional: Negative for chills, diaphoresis, fatigue and fever.    HENT: Negative for congestion, dental problem, drooling, ear discharge, ear pain, facial swelling, hearing loss, mouth sores, nosebleeds, postnasal drip, rhinorrhea, sinus pressure, sinus pain, sneezing, sore throat, tinnitus, trouble swallowing and voice change. Eyes: Negative for photophobia, pain, discharge, redness, itching and visual disturbance. Respiratory: Negative for apnea, cough, chest tightness, shortness of breath and wheezing. Cardiovascular: Negative for chest pain, palpitations and leg swelling. Gastrointestinal: Negative for abdominal distention, abdominal pain, blood in stool, constipation, diarrhea, nausea, rectal pain and vomiting. Endocrine: Negative for cold intolerance, heat intolerance, polydipsia, polyphagia and polyuria. Genitourinary: Negative for decreased urine volume, difficulty urinating, dysuria, flank pain, frequency, genital sores, hematuria and urgency. Musculoskeletal: Negative for arthralgias, back pain, gait problem, joint swelling, myalgias, neck pain and neck stiffness. Skin: Negative for color change, rash and wound. Allergic/Immunologic: Negative for environmental allergies and food allergies. Neurological: Negative for dizziness, tremors, seizures, syncope, facial asymmetry, speech difficulty, weakness, light-headedness, numbness and headaches. Hematological: Negative for adenopathy. Does not bruise/bleed easily. Psychiatric/Behavioral: Negative for agitation, confusion, decreased concentration, hallucinations, self-injury, sleep disturbance and suicidal ideas. The patient is not nervous/anxious. Objective: There were no vitals taken for this visit. Physical Exam  Constitutional:       General: He is not in acute distress. Appearance: He is well-developed. Comments: Sitting comfortably in wheelchair   HENT:      Head: Normocephalic.       Right Ear: External ear normal.      Left Ear: External ear normal.   Eyes:      Conjunctiva/sclera: Conjunctivae normal.   Neck:      Vascular: No JVD. Trachea: No tracheal deviation. Cardiovascular:      Rate and Rhythm: Normal rate and regular rhythm. Heart sounds: Normal heart sounds. Pulmonary:      Effort: Pulmonary effort is normal. No respiratory distress. Breath sounds: Normal breath sounds. No wheezing or rales. Chest:      Chest wall: No tenderness. Abdominal:      General: Bowel sounds are normal. There is no distension. Palpations: Abdomen is soft. There is no mass. Tenderness: There is no abdominal tenderness. There is no guarding or rebound. Musculoskeletal:         General: No tenderness or deformity. Cervical back: Neck supple. Skin:     General: Skin is warm and dry. Coloration: Skin is not pale. Findings: No erythema or rash. Neurological:      Mental Status: He is alert and oriented to person, place, and time. Motor: No abnormal muscle tone. Comments: Left hemiparesis   Psychiatric:         Thought Content:  Thought content normal.         Judgment: Judgment normal.         Component      Latest Ref Rng & Units 3/11/2022 3/11/2022 3/10/2022          11:30 AM 11:30 AM  6:21 AM   WBC      4.8 - 10.8 K/uL   6.0   RBC      4.70 - 6.10 M/uL   4.19 (L)   Hemoglobin Quant      14.0 - 18.0 g/dL   10.6 (L)   Hematocrit      42.0 - 52.0 %   33.7 (L)   MCV      80.0 - 100.0 fL   80.5   MCH      27.0 - 31.3 pg   25.4 (L)   MCHC      33.0 - 37.0 %   31.6 (L)   RDW      11.5 - 14.5 %   16.4 (H)   Platelet Count      042 - 400 K/uL   301   Neutrophils %      %   59.4   Lymphocyte %      %   25.3   Monocytes %      %   11.6   Eosinophils %      %   3.0   Basophils %      %   0.7   Neutrophils Absolute      1.4 - 6.5 K/uL   3.6   Lymphocytes Absolute      1.0 - 4.8 K/uL   1.5   Monocytes Absolute      0.2 - 0.8 K/uL   0.7   Eosinophils Absolute      0.0 - 0.7 K/uL   0.2   Basophils Absolute      0.0 - 0.2 K/uL   0.0   Sodium      135 - 144 mEq/L  142 Potassium      3.4 - 4.9 mEq/L  4.3    Chloride      95 - 107 mEq/L  106    CO2      20 - 31 mEq/L  21    Anion Gap      9 - 15 mEq/L  15    GLUCOSE, FASTING,GF      70 - 99 mg/dL  110 (H)    BUN,BUNPL      8 - 23 mg/dL  10    Creatinine      0.70 - 1.20 mg/dL  0.76    GFR Non-      >60  >60.0    GFR       >60  >60.0    CALCIUM, SERUM, 559659      8.5 - 9.9 mg/dL  9.4    Magnesium      1.7 - 2.4 mg/dL 2.1           Assessment:       Diagnosis Orders   1. Muscle spasm     2. Left hemiparesis (HCC)     3. Spastic hemiplegia of left nondominant side as late effect of cerebral infarction ([facility] [address]     4. Primary hypertension     5. Oral phase dysphagia     6. Neuropathic pain     7. Hypovitaminosis D     8. Seizure disorder ([facility] [address]     9. Iron deficiency anemia, unspecified iron deficiency anemia type     10. Hyperlipidemia, unspecified hyperlipidemia type          No results found for: LIPIDPAN, BMP, CMP, CBC, CBCAUTODIF  Plan:      Diagnoses and all orders for this visit:  Muscle spasms: Assess electrolytes via basic metabolic panel and magnesium level        Left lower extremity edema: Ultrasound of the lower extremity was negative for DVT. Spastic hemiplegia of left nondominant side as late effect of cerebral infarction (HCC)/abdominal paresis/oral phase dysphagia-optimizing blood pressure, continue Lipitor. Continue puréed diet        Primary hypertension-continue Norvasc 10 mg orally daily and metoprolol 25 mg twice daily        Neuropathic pain-continue Neurontin        Hypovitaminosis D-continue oral vitamin D replacement        Seizure disorder (HCC)-continue Keppra        Iron deficiency anemia, unspecified iron deficiency anemia type-continue iron sulfate         Return in about 1 month (around 4/11/2022).       On this date 03/15/22 I have spent 30 minutes reviewing previous notes, test results and face to face with the patient discussing the diagnosis and importance of

## 2022-04-15 ENCOUNTER — OFFICE VISIT (OUTPATIENT)
Dept: GERIATRIC MEDICINE | Age: 62
End: 2022-04-15
Payer: MEDICAID

## 2022-04-15 DIAGNOSIS — I69.354 SPASTIC HEMIPLEGIA OF LEFT NONDOMINANT SIDE AS LATE EFFECT OF CEREBRAL INFARCTION (HCC): ICD-10-CM

## 2022-04-15 DIAGNOSIS — G40.909 SEIZURE DISORDER (HCC): ICD-10-CM

## 2022-04-15 DIAGNOSIS — E78.5 HYPERLIPIDEMIA, UNSPECIFIED HYPERLIPIDEMIA TYPE: ICD-10-CM

## 2022-04-15 DIAGNOSIS — Z86.73 HISTORY OF CEREBROVASCULAR ACCIDENT: Primary | ICD-10-CM

## 2022-04-15 DIAGNOSIS — D50.9 IRON DEFICIENCY ANEMIA, UNSPECIFIED IRON DEFICIENCY ANEMIA TYPE: ICD-10-CM

## 2022-04-15 DIAGNOSIS — I10 ESSENTIAL HYPERTENSION: ICD-10-CM

## 2022-04-15 DIAGNOSIS — E55.9 HYPOVITAMINOSIS D: ICD-10-CM

## 2022-04-15 PROCEDURE — 99309 SBSQ NF CARE MODERATE MDM 30: CPT | Performed by: INTERNAL MEDICINE

## 2022-05-03 VITALS
BODY MASS INDEX: 25.42 KG/M2 | DIASTOLIC BLOOD PRESSURE: 86 MMHG | WEIGHT: 157.5 LBS | SYSTOLIC BLOOD PRESSURE: 139 MMHG | OXYGEN SATURATION: 95 % | TEMPERATURE: 97.4 F | HEART RATE: 96 BPM | RESPIRATION RATE: 18 BRPM

## 2022-05-03 ASSESSMENT — ENCOUNTER SYMPTOMS
TROUBLE SWALLOWING: 0
COLOR CHANGE: 0
EYE PAIN: 0
CONSTIPATION: 0
EYE DISCHARGE: 0
SINUS PAIN: 0
VOICE CHANGE: 0
CHEST TIGHTNESS: 0
EYE ITCHING: 0
PHOTOPHOBIA: 0
BLOOD IN STOOL: 0
RHINORRHEA: 0
ABDOMINAL DISTENTION: 0
WHEEZING: 0
EYE REDNESS: 0
SHORTNESS OF BREATH: 0
BACK PAIN: 0
SINUS PRESSURE: 0
ABDOMINAL PAIN: 0
DIARRHEA: 0
VOMITING: 0
RECTAL PAIN: 0
SORE THROAT: 0
APNEA: 0
COUGH: 0
NAUSEA: 0
FACIAL SWELLING: 0

## 2022-05-03 NOTE — PROGRESS NOTES
Subjective:      Patient ID: Latasha Dang is a 58 y.o. male Established patient, here for evaluation of the following chief complaint(s):  Chief Complaint   Patient presents with    Hypertension       Hypertension  Pertinent negatives include no chest pain, headaches, neck pain, palpitations or shortness of breath. 79-year-old hypertensive male status postacute CVA with left hemiparesis, associated dysphagia, iron deficiency anemia, seizure disorder, and hypovitaminosis D and hyperlipidemia being seen out of medical necessity for left lower extremity edema    Muscle spasms: The patient reports experiencing muscle spasms      Left lower extremity edema: The patient states that over the course of the past 24 hours he has noted worsening left calf pain and associated left lower extremity edema. .      History of cerebrovascular accident with associated dysphagia: The patient is receiving Lipitor the patient is receiving a puréed texture regular thin consistency diet. Neuropathic pain: Receiving gabapentin as needed for pain        Iron deficiency anemia: Receiving iron sulfate 325 mg daily        Essential hypertension: Receiving Norvasc 10 mg orally daily Lopressor 25 mg twice daily          Hypovitaminosis D: The patient is receiving vitamin D3 1000 national units daily. Most recent vitamin D level on October 18, 2021 was 33.7. Seizure disorder: The patient is receiving  At present he denies polyuria,  Polydipsia, constitutional, sinus, visual, cardiopulmonary, urologic, gastrointestinal, immunologic/hematologic, additional musculoskeletal, neurologic,dermatologic, or psychiatric complaints.     Current Outpatient Medications on File Prior to Visit   Medication Sig Dispense Refill    atorvastatin (LIPITOR) 40 MG tablet       Handicap Placard MISC by Does not apply route Good for 5 years EXP: 7/27/2026 1 each 0    cetirizine (ZYRTEC) 10 MG tablet Take 1 tablet by mouth daily 30 tablet 11    gabapentin (NEURONTIN) 100 MG capsule TAKE 1 CAPSULE BY MOUTH THREE TIMES A DAY 90 capsule 5    levETIRAcetam (KEPPRA) 500 MG tablet Take 1 tablet by mouth 2 times daily 60 tablet 5    ferrous sulfate (IRON 325) 325 (65 Fe) MG tablet Take 1 tablet by mouth daily (with breakfast) 90 tablet 1    metoprolol tartrate (LOPRESSOR) 25 MG tablet Take 2 tablets by mouth daily 90 tablet 2    hydroCHLOROthiazide (HYDRODIURIL) 25 MG tablet Take 1 tablet by mouth every morning (Patient not taking: Reported on 6/10/2021) 30 tablet 0    metoprolol tartrate (LOPRESSOR) 25 MG tablet GIVE 1 TABLET VIA PEG TUBE IN THE MORNING AND AT BEDTIME (Patient not taking: Reported on 7/27/2021) 180 tablet 4    amLODIPine (NORVASC) 10 MG tablet 1 tablet via PEG daily. 90 tablet 4    ofloxacin (OCUFLOX) 0.3 % solution       oxyCODONE (ROXICODONE) 5 MG/5ML solution  (Patient not taking: Reported on 6/10/2021)      aspirin 81 MG chewable tablet Take 81 mg by mouth daily (Patient not taking: Reported on 7/27/2021)      melatonin 1 MG tablet Take 10 mg by mouth nightly as needed      Handicap Placard MISC by Does not apply route Good for 5 years. Expires 3/10/2026. 1 each 0     No current facility-administered medications on file prior to visit. Patient has no known allergies.   Past Medical History:   Diagnosis Date    Hyperlipidemia     Hypertension     Osteoarthritis     S/P inguinal herniorrhaphy     left    Stroke ([address] 12/20/2020     Past Surgical History:   Procedure Laterality Date    HERNIA REPAIR Bilateral     MS COLON CA SCRN NOT HI RSK IND N/A 10/6/2017    COLONOSCOPY performed by Mason Varela MD at [address] [place] [address]       Social History     Socioeconomic History    Marital status:      Spouse name: Not on file    Number of children: Not on file    Years of education: Not on file    Highest education level: Not on file   Occupational History    Not on file Tobacco Use    Smoking status: Former Smoker     Packs/day: 1.00     Years: 20.00     Pack years: 20.00     Types: Cigarettes     Start date: 0     Quit date: 2020     Years since quittin.3    Smokeless tobacco: Never Used   Vaping Use    Vaping Use: Never used   Substance and Sexual Activity    Alcohol use: Yes     Alcohol/week: 18.0 standard drinks     Types: 18 Cans of beer per week    Drug use: Yes     Types: Marijuana Roberta Dasen)     Comment: 2X/ month    Sexual activity: Not on file     Comment:    Other Topics Concern    Not on file   Social History Narrative    Not on file     Social Determinants of Health     Financial Resource Strain:     Difficulty of Paying Living Expenses: Not on file   Food Insecurity:     Worried About Running Out of Food in the Last Year: Not on file    Jack of Food in the Last Year: Not on file   Transportation Needs:     Lack of Transportation (Medical): Not on file    Lack of Transportation (Non-Medical): Not on file   Physical Activity:     Days of Exercise per Week: Not on file    Minutes of Exercise per Session: Not on file   Stress:     Feeling of Stress : Not on file   Social Connections:     Frequency of Communication with Friends and Family: Not on file    Frequency of Social Gatherings with Friends and Family: Not on file    Attends Jew Services: Not on file    Active Member of [address] [place] [address] or Organizations: Not on file    Attends Club or Organization Meetings: Not on file    Marital Status: Not on file   Intimate Partner Violence:     Fear of Current or Ex-Partner: Not on file    Emotionally Abused: Not on file    Physically Abused: Not on file    Sexually Abused: Not on file   Housing Stability:     Unable to Pay for Housing in the Last Year: Not on file    Number of [place] in the Last Year: Not on file    Unstable Housing in the Last Year: Not on file     No family history on file.       Review of Systems Constitutional: Negative for chills, diaphoresis, fatigue and fever. HENT: Negative for congestion, dental problem, drooling, ear discharge, ear pain, facial swelling, hearing loss, mouth sores, nosebleeds, postnasal drip, rhinorrhea, sinus pressure, sinus pain, sneezing, sore throat, tinnitus, trouble swallowing and voice change. Eyes: Negative for photophobia, pain, discharge, redness, itching and visual disturbance. Respiratory: Negative for apnea, cough, chest tightness, shortness of breath and wheezing. Cardiovascular: Negative for chest pain, palpitations and leg swelling. Gastrointestinal: Negative for abdominal distention, abdominal pain, blood in stool, constipation, diarrhea, nausea, rectal pain and vomiting. Endocrine: Negative for cold intolerance, heat intolerance, polydipsia, polyphagia and polyuria. Genitourinary: Negative for decreased urine volume, difficulty urinating, dysuria, flank pain, frequency, genital sores, hematuria and urgency. Musculoskeletal: Negative for arthralgias, back pain, gait problem, joint swelling, myalgias, neck pain and neck stiffness. Skin: Negative for color change, rash and wound. Allergic/Immunologic: Negative for environmental allergies and food allergies. Neurological: Negative for dizziness, tremors, seizures, syncope, facial asymmetry, speech difficulty, weakness, light-headedness, numbness and headaches. Hematological: Negative for adenopathy. Does not bruise/bleed easily. Psychiatric/Behavioral: Negative for agitation, confusion, decreased concentration, hallucinations, self-injury, sleep disturbance and suicidal ideas. The patient is not nervous/anxious. Objective:   /86   Pulse 96   Temp 97.4 °F (36.3 °C)   Resp 18   Wt 157 lb 8 oz (71.4 kg)   SpO2 95%   BMI 25.42 kg/m²     Physical Exam  Constitutional:       General: He is not in acute distress. Appearance: He is well-developed.       Comments: Sitting comfortably in wheelchair   HENT:      Head: Normocephalic. Right Ear: External ear normal.      Left Ear: External ear normal.   Eyes:      Conjunctiva/sclera: Conjunctivae normal.   Neck:      Vascular: No JVD. Trachea: No tracheal deviation. Cardiovascular:      Rate and Rhythm: Normal rate and regular rhythm. Heart sounds: Normal heart sounds. Pulmonary:      Effort: Pulmonary effort is normal. No respiratory distress. Breath sounds: Normal breath sounds. No wheezing or rales. Chest:      Chest wall: No tenderness. Abdominal:      General: Bowel sounds are normal. There is no distension. Palpations: Abdomen is soft. There is no mass. Tenderness: There is no abdominal tenderness. There is no guarding or rebound. Musculoskeletal:         General: No tenderness or deformity. Cervical back: Neck supple. Skin:     General: Skin is warm and dry. Coloration: Skin is not pale. Findings: No erythema or rash. Neurological:      Mental Status: He is alert and oriented to person, place, and time. Motor: No abnormal muscle tone. Comments: Left hemiparesis   Psychiatric:         Thought Content:  Thought content normal.         Judgment: Judgment normal.         Component      Latest Ref Rng & Units 3/11/2022 3/11/2022 3/10/2022          11:30 AM 11:30 AM  6:21 AM   WBC      4.8 - 10.8 K/uL   6.0   RBC      4.70 - 6.10 M/uL   4.19 (L)   Hemoglobin Quant      14.0 - 18.0 g/dL   10.6 (L)   Hematocrit      42.0 - 52.0 %   33.7 (L)   MCV      80.0 - 100.0 fL   80.5   MCH      27.0 - 31.3 pg   25.4 (L)   MCHC      33.0 - 37.0 %   31.6 (L)   RDW      11.5 - 14.5 %   16.4 (H)   Platelet Count      316 - 400 K/uL   301   Neutrophils %      %   59.4   Lymphocyte %      %   25.3   Monocytes %      %   11.6   Eosinophils %      %   3.0   Basophils %      %   0.7   Neutrophils Absolute      1.4 - 6.5 K/uL   3.6   Lymphocytes Absolute      1.0 - 4.8 K/uL   1.5   Monocytes Absolute      0.2 - 0.8 K/uL   0.7   Eosinophils Absolute      0.0 - 0.7 K/uL   0.2   Basophils Absolute      0.0 - 0.2 K/uL   0.0   Sodium      135 - 144 mEq/L  142    Potassium      3.4 - 4.9 mEq/L  4.3    Chloride      95 - 107 mEq/L  106    CO2      20 - 31 mEq/L  21    Anion Gap      9 - 15 mEq/L  15    GLUCOSE, FASTING,GF      70 - 99 mg/dL  110 (H)    BUN,BUNPL      8 - 23 mg/dL  10    Creatinine      0.70 - 1.20 mg/dL  0.76    GFR Non-      >60  >60.0    GFR       >60  >60.0    CALCIUM, SERUM, 522392      8.5 - 9.9 mg/dL  9.4    Magnesium      1.7 - 2.4 mg/dL 2.1       Component      Latest Ref Rng & Units 4/5/2022           7:50 AM   Levetiracetam      10 - 40 ug/mL 10       Assessment:       Diagnosis Orders   1. History of cerebrovascular accident     2. Essential hypertension     3. Seizure disorder ([facility]     4. Spastic hemiplegia of left nondominant side as late effect of cerebral infarction ([facility] [address]     5. Hypovitaminosis D     6. Iron deficiency anemia, unspecified iron deficiency anemia type     7. Hyperlipidemia, unspecified hyperlipidemia type          No results found for: LIPIDPAN, BMP, CMP, CBC, CBCAUTODIF  Plan:      Diagnoses and all orders for this visit:  Muscle spasms: stable. Monitoring        Left lower extremity edema: Ultrasound of the lower extremity was negative for DVT. Spastic hemiplegia of left nondominant side as late effect of cerebral infarction (HCC)/abdominal paresis/oral phase dysphagiaoptimizing blood pressure, continue Lipitor. Continue puréed diet        Primary hypertensioncontinue Norvasc 10 mg orally daily and metoprolol 25 mg twice daily        Neuropathic paincontinue Neurontin        Hypovitaminosis Dcontinue oral vitamin D replacement        Seizure disorder (HCC)continue Keppra        Iron deficiency anemia, unspecified iron deficiency anemia typecontinue iron sulfate         No follow-ups on file.       On this date 05/03/22 I have spent 30 minutes reviewing previous notes, test results and face to face with the patient discussing the diagnosis and importance of compliance with the treatment plan. Emma Rosas MD    Please note, this report has been partially produced using speech recognition software  and may cause  and /or contain errors related to that system including grammar, punctuation and spelling as well as words and phrases that may seem inappropriate. If there are questions or concerns please feel free to contact me to clarify.

## 2022-05-20 ENCOUNTER — OFFICE VISIT (OUTPATIENT)
Dept: GERIATRIC MEDICINE | Age: 62
End: 2022-05-20
Payer: MEDICAID

## 2022-05-20 DIAGNOSIS — G40.909 SEIZURE DISORDER (HCC): ICD-10-CM

## 2022-05-20 DIAGNOSIS — E78.5 HYPERLIPIDEMIA, UNSPECIFIED HYPERLIPIDEMIA TYPE: ICD-10-CM

## 2022-05-20 DIAGNOSIS — Z86.73 HISTORY OF CEREBROVASCULAR ACCIDENT: Primary | ICD-10-CM

## 2022-05-20 DIAGNOSIS — D50.9 IRON DEFICIENCY ANEMIA, UNSPECIFIED IRON DEFICIENCY ANEMIA TYPE: ICD-10-CM

## 2022-05-20 DIAGNOSIS — R25.2 SPASTICITY AS LATE EFFECT OF CEREBROVASCULAR ACCIDENT (CVA): Primary | ICD-10-CM

## 2022-05-20 DIAGNOSIS — I69.354 SPASTIC HEMIPLEGIA OF LEFT NONDOMINANT SIDE AS LATE EFFECT OF CEREBRAL INFARCTION (HCC): ICD-10-CM

## 2022-05-20 DIAGNOSIS — E55.9 HYPOVITAMINOSIS D: ICD-10-CM

## 2022-05-20 DIAGNOSIS — I10 ESSENTIAL HYPERTENSION: ICD-10-CM

## 2022-05-20 DIAGNOSIS — R13.11 ORAL PHASE DYSPHAGIA: ICD-10-CM

## 2022-05-20 DIAGNOSIS — M62.838 MUSCLE SPASM: ICD-10-CM

## 2022-05-20 DIAGNOSIS — I69.398 SPASTICITY AS LATE EFFECT OF CEREBROVASCULAR ACCIDENT (CVA): Primary | ICD-10-CM

## 2022-05-20 DIAGNOSIS — G81.94 LEFT HEMIPARESIS (HCC): ICD-10-CM

## 2022-05-20 PROCEDURE — 99309 SBSQ NF CARE MODERATE MDM 30: CPT | Performed by: INTERNAL MEDICINE

## 2022-05-20 RX ORDER — DIAZEPAM 5 MG/1
5 TABLET ORAL EVERY 12 HOURS
Qty: 60 TABLET | Refills: 0 | Status: SHIPPED | OUTPATIENT
Start: 2022-05-20 | End: 2022-06-19

## 2022-05-23 VITALS
TEMPERATURE: 97.3 F | RESPIRATION RATE: 18 BRPM | BODY MASS INDEX: 25.52 KG/M2 | OXYGEN SATURATION: 95 % | SYSTOLIC BLOOD PRESSURE: 139 MMHG | HEART RATE: 96 BPM | DIASTOLIC BLOOD PRESSURE: 86 MMHG | WEIGHT: 158.1 LBS

## 2022-05-23 ASSESSMENT — ENCOUNTER SYMPTOMS
VOICE CHANGE: 0
BLOOD IN STOOL: 0
BACK PAIN: 0
ABDOMINAL PAIN: 0
SORE THROAT: 0
DIARRHEA: 0
ABDOMINAL DISTENTION: 0
SHORTNESS OF BREATH: 0
EYE DISCHARGE: 0
RHINORRHEA: 0
EYE ITCHING: 0
CONSTIPATION: 0
WHEEZING: 0
FACIAL SWELLING: 0
CHEST TIGHTNESS: 0
APNEA: 0
NAUSEA: 0
EYE REDNESS: 0
RECTAL PAIN: 0
COUGH: 0
COLOR CHANGE: 0
EYE PAIN: 0
VOMITING: 0
SINUS PAIN: 0
TROUBLE SWALLOWING: 0
PHOTOPHOBIA: 0
SINUS PRESSURE: 0

## 2022-05-23 NOTE — PROGRESS NOTES
Subjective:      Patient ID: Margaret Saavedra is a 58 y.o. male Established patient, here for evaluation of the following chief complaint(s):  Chief Complaint   Patient presents with    Hypertension       Hypertension  Pertinent negatives include no chest pain, headaches, neck pain, palpitations or shortness of breath. 51-year-old hypertensive male status postacute CVA with left hemiparesis, associated dysphagia, iron deficiency anemia, seizure disorder, and hypovitaminosis D and hyperlipidemia being seen out of medical necessity for left lower extremity edema    Muscle spasms: The patient reports experiencing muscle spasms      Left lower extremity edema: The patient states that over the course of the past 24 hours he has noted worsening left calf pain and associated left lower extremity edema. .      History of cerebrovascular accident with associated dysphagia: The patient is receiving Lipitor the patient is receiving a puréed texture regular thin consistency diet. Neuropathic pain: Receiving gabapentin as needed for pain        Iron deficiency anemia: Receiving iron sulfate 325 mg daily        Essential hypertension: Receiving Norvasc 10 mg orally daily Lopressor 25 mg twice daily          Hypovitaminosis D: The patient is receiving vitamin D3 1000 national units daily. Most recent vitamin D level on October 18, 2021 was 33.7. Seizure disorder: The patient is receiving  At present he denies polyuria,  Polydipsia, constitutional, sinus, visual, cardiopulmonary, urologic, gastrointestinal, immunologic/hematologic, additional musculoskeletal, neurologic,dermatologic, or psychiatric complaints. Current Outpatient Medications on File Prior to Visit   Medication Sig Dispense Refill    diazePAM (VALIUM) 5 MG tablet Take 1 tablet by mouth every 12 hours for 30 days.  60 tablet 0    atorvastatin (LIPITOR) 40 MG tablet       Handicap Placard MISC by Does not apply route Good for 5 years EXP: 7/27/2026 on file    Highest education level: Not on file   Occupational History    Not on file   Tobacco Use    Smoking status: Former Smoker     Packs/day: 1.00     Years: 20.00     Pack years: 20.00     Types: Cigarettes     Start date: 0     Quit date: 2020     Years since quittin.4    Smokeless tobacco: Never Used   Vaping Use    Vaping Use: Never used   Substance and Sexual Activity    Alcohol use: Yes     Alcohol/week: 18.0 standard drinks     Types: 18 Cans of beer per week    Drug use: Yes     Types: Marijuana Kasie Kales)     Comment: 2X/ month    Sexual activity: Not on file     Comment:    Other Topics Concern    Not on file   Social History Narrative    Not on file     Social Determinants of Health     Financial Resource Strain:     Difficulty of Paying Living Expenses: Not on file   Food Insecurity:     Worried About Running Out of Food in the Last Year: Not on file    Jack of Food in the Last Year: Not on file   Transportation Needs:     Lack of Transportation (Medical): Not on file    Lack of Transportation (Non-Medical):  Not on file   Physical Activity:     Days of Exercise per Week: Not on file    Minutes of Exercise per Session: Not on file   Stress:     Feeling of Stress : Not on file   Social Connections:     Frequency of Communication with Friends and Family: Not on file    Frequency of Social Gatherings with Friends and Family: Not on file    Attends Temple Services: Not on file    Active Member of Clubs or Organizations: Not on file    Attends Club or Organization Meetings: Not on file    Marital Status: Not on file   Intimate Partner Violence:     Fear of Current or Ex-Partner: Not on file    Emotionally Abused: Not on file    Physically Abused: Not on file    Sexually Abused: Not on file   Housing Stability:     Unable to Pay for Housing in the Last Year: Not on file    Number of [place] in the Last Year: Not on file    Unstable Housing in the Last Year: Not on file     No family history on file. Review of Systems   Constitutional: Negative for chills, diaphoresis, fatigue and fever. HENT: Negative for congestion, dental problem, drooling, ear discharge, ear pain, facial swelling, hearing loss, mouth sores, nosebleeds, postnasal drip, rhinorrhea, sinus pressure, sinus pain, sneezing, sore throat, tinnitus, trouble swallowing and voice change. Eyes: Negative for photophobia, pain, discharge, redness, itching and visual disturbance. Respiratory: Negative for apnea, cough, chest tightness, shortness of breath and wheezing. Cardiovascular: Negative for chest pain, palpitations and leg swelling. Gastrointestinal: Negative for abdominal distention, abdominal pain, blood in stool, constipation, diarrhea, nausea, rectal pain and vomiting. Endocrine: Negative for cold intolerance, heat intolerance, polydipsia, polyphagia and polyuria. Genitourinary: Negative for decreased urine volume, difficulty urinating, dysuria, flank pain, frequency, genital sores, hematuria and urgency. Musculoskeletal: Negative for arthralgias, back pain, gait problem, joint swelling, myalgias, neck pain and neck stiffness. Skin: Negative for color change, rash and wound. Allergic/Immunologic: Negative for environmental allergies and food allergies. Neurological: Negative for dizziness, tremors, seizures, syncope, facial asymmetry, speech difficulty, weakness, light-headedness, numbness and headaches. Hematological: Negative for adenopathy. Does not bruise/bleed easily. Psychiatric/Behavioral: Negative for agitation, confusion, decreased concentration, hallucinations, self-injury, sleep disturbance and suicidal ideas. The patient is not nervous/anxious.           Objective:   /86   Pulse 96   Temp 97.3 °F (36.3 °C)   Resp 18   Wt 158 lb 1.6 oz (71.7 kg)   SpO2 95%   BMI 25.52 kg/m²     Physical Exam  Constitutional:       General: He is not in acute distress. Appearance: He is well-developed. Comments: Sitting comfortably in wheelchair   HENT:      Head: Normocephalic. Right Ear: External ear normal.      Left Ear: External ear normal.   Eyes:      Conjunctiva/sclera: Conjunctivae normal.   Neck:      Vascular: No JVD. Trachea: No tracheal deviation. Cardiovascular:      Rate and Rhythm: Normal rate and regular rhythm. Heart sounds: Normal heart sounds. Pulmonary:      Effort: Pulmonary effort is normal. No respiratory distress. Breath sounds: Normal breath sounds. No wheezing or rales. Chest:      Chest wall: No tenderness. Abdominal:      General: Bowel sounds are normal. There is no distension. Palpations: Abdomen is soft. There is no mass. Tenderness: There is no abdominal tenderness. There is no guarding or rebound. Musculoskeletal:         General: No tenderness or deformity. Cervical back: Neck supple. Skin:     General: Skin is warm and dry. Coloration: Skin is not pale. Findings: No erythema or rash. Neurological:      Mental Status: He is alert and oriented to person, place, and time. Motor: No abnormal muscle tone. Comments: Left hemiparesis   Psychiatric:         Thought Content:  Thought content normal.         Judgment: Judgment normal.       Component      Latest Ref Rng & Units 4/5/2022           7:50 AM   Levetiracetam      10 - 40 ug/mL 10     Component      Latest Ref Rng & Units 3/11/2022 3/11/2022 3/10/2022          11:30 AM 11:30 AM  6:21 AM   WBC      4.8 - 10.8 K/uL   6.0   RBC      4.70 - 6.10 M/uL   4.19 (L)   Hemoglobin Quant      14.0 - 18.0 g/dL   10.6 (L)   Hematocrit      42.0 - 52.0 %   33.7 (L)   MCV      80.0 - 100.0 fL   80.5   MCH      27.0 - 31.3 pg   25.4 (L)   MCHC      33.0 - 37.0 %   31.6 (L)   RDW      11.5 - 14.5 %   16.4 (H)   Platelet Count      923 - 400 K/uL   301   Neutrophils %      %   59.4   Lymphocyte %      %   25.3   Monocytes %      %   11.6   Eosinophils %      %   3.0   Basophils %      %   0.7   Neutrophils Absolute      1.4 - 6.5 K/uL   3.6   Lymphocytes Absolute      1.0 - 4.8 K/uL   1.5   Monocytes Absolute      0.2 - 0.8 K/uL   0.7   Eosinophils Absolute      0.0 - 0.7 K/uL   0.2   Basophils Absolute      0.0 - 0.2 K/uL   0.0   Sodium      135 - 144 mEq/L  142    Potassium      3.4 - 4.9 mEq/L  4.3    Chloride      95 - 107 mEq/L  106    CO2      20 - 31 mEq/L  21    Anion Gap      9 - 15 mEq/L  15    GLUCOSE, FASTING,GF      70 - 99 mg/dL  110 (H)    BUN,BUNPL      8 - 23 mg/dL  10    Creatinine      0.70 - 1.20 mg/dL  0.76    GFR Non-      >60  >60.0    GFR       >60  >60.0    CALCIUM, SERUM, 438215      8.5 - 9.9 mg/dL  9.4    Magnesium      1.7 - 2.4 mg/dL 2.1       Component      Latest Ref Rng & Units 4/5/2022           7:50 AM   Levetiracetam      10 - 40 ug/mL 10       Assessment:       Diagnosis Orders   1. History of cerebrovascular accident     2. Essential hypertension     3. Seizure disorder ([address]     4. Spastic hemiplegia of left nondominant side as late effect of cerebral infarction ([address]     5. Hypovitaminosis D     6. Iron deficiency anemia, unspecified iron deficiency anemia type     7. Hyperlipidemia, unspecified hyperlipidemia type     8. Muscle spasm     9. Left hemiparesis ([address]     10. Oral phase dysphagia          No results found for: LIPIDPAN, BMP, CMP, CBC, CBCAUTODIF  Plan:      Diagnoses and all orders for this visit:  Muscle spasms: stable. Monitoring        Left lower extremity edema: Ultrasound of the lower extremity was negative for DVT. Spastic hemiplegia of left nondominant side as late effect of cerebral infarction (HCC)/abdominal paresis/oral phase dysphagiaoptimizing blood pressure, continue Lipitor.   Continue puréed diet        Primary hypertensioncontinue Norvasc 10 mg orally daily and metoprolol 25 mg twice daily        Neuropathic paincontinue Neurontin        Hypovitaminosis Dcontinue oral vitamin D replacement        Seizure disorder (HCC)continue Keppra        Iron deficiency anemia, unspecified iron deficiency anemia typecontinue iron sulfate         No follow-ups on file. On this date 05/23/22 I have spent 30 minutes reviewing previous notes, test results and face to face with the patient discussing the diagnosis and importance of compliance with the treatment plan. Kimberly Le MD    Please note, this report has been partially produced using speech recognition software  and may cause  and /or contain errors related to that system including grammar, punctuation and spelling as well as words and phrases that may seem inappropriate. If there are questions or concerns please feel free to contact me to clarify.

## 2022-07-18 ENCOUNTER — OFFICE VISIT (OUTPATIENT)
Dept: GERIATRIC MEDICINE | Age: 62
End: 2022-07-18
Payer: MEDICAID

## 2022-07-18 VITALS
TEMPERATURE: 96.4 F | HEART RATE: 80 BPM | RESPIRATION RATE: 16 BRPM | SYSTOLIC BLOOD PRESSURE: 121 MMHG | WEIGHT: 163 LBS | BODY MASS INDEX: 26.31 KG/M2 | OXYGEN SATURATION: 97 % | DIASTOLIC BLOOD PRESSURE: 65 MMHG

## 2022-07-18 DIAGNOSIS — D50.9 IRON DEFICIENCY ANEMIA, UNSPECIFIED IRON DEFICIENCY ANEMIA TYPE: ICD-10-CM

## 2022-07-18 DIAGNOSIS — Z86.73 HISTORY OF CEREBROVASCULAR ACCIDENT: Primary | ICD-10-CM

## 2022-07-18 DIAGNOSIS — G40.909 SEIZURE DISORDER (HCC): ICD-10-CM

## 2022-07-18 DIAGNOSIS — E55.9 HYPOVITAMINOSIS D: ICD-10-CM

## 2022-07-18 DIAGNOSIS — I10 ESSENTIAL HYPERTENSION: ICD-10-CM

## 2022-07-18 DIAGNOSIS — I69.354 SPASTIC HEMIPLEGIA OF LEFT NONDOMINANT SIDE AS LATE EFFECT OF CEREBRAL INFARCTION (HCC): ICD-10-CM

## 2022-07-18 DIAGNOSIS — E78.5 HYPERLIPIDEMIA, UNSPECIFIED HYPERLIPIDEMIA TYPE: ICD-10-CM

## 2022-07-18 PROCEDURE — 99309 SBSQ NF CARE MODERATE MDM 30: CPT | Performed by: INTERNAL MEDICINE

## 2022-07-18 ASSESSMENT — ENCOUNTER SYMPTOMS
WHEEZING: 0
PHOTOPHOBIA: 0
COLOR CHANGE: 0
SORE THROAT: 0
DIARRHEA: 0
SINUS PAIN: 0
COUGH: 0
CHEST TIGHTNESS: 0
SHORTNESS OF BREATH: 0
RECTAL PAIN: 0
ABDOMINAL DISTENTION: 0
EYE REDNESS: 0
EYE DISCHARGE: 0
APNEA: 0
BACK PAIN: 0
VOMITING: 0
SINUS PRESSURE: 0
ABDOMINAL PAIN: 0
EYE ITCHING: 0
TROUBLE SWALLOWING: 0
EYE PAIN: 0
CONSTIPATION: 0
RHINORRHEA: 0
FACIAL SWELLING: 0
BLOOD IN STOOL: 0
VOICE CHANGE: 0
NAUSEA: 0

## 2022-07-19 NOTE — PROGRESS NOTES
Subjective:      Patient ID: Torres Wyatt is a 58 y.o. male Established patient, here for evaluation of the following chief complaint(s):  Chief Complaint   Patient presents with    Hypertension         Hypertension  Pertinent negatives include no chest pain, headaches, neck pain, palpitations or shortness of breath. 27-year-old hypertensive male status postacute CVA with left hemiparesis, associated dysphagia, iron deficiency anemia, seizure disorder, and hypovitaminosis D and hyperlipidemia being seen out of medical necessity for left lower extremity edema    Muscle spasms: The patient reports experiencing muscle spasms      Left lower extremity edema: The patient states that over the course of the past 24 hours he has noted worsening left calf pain and associated left lower extremity edema. .      History of cerebrovascular accident with associated dysphagia: The patient is receiving Lipitor the patient is receiving a puréed texture regular thin consistency diet. Neuropathic pain: Receiving gabapentin as needed for pain        Iron deficiency anemia: Receiving iron sulfate 325 mg daily        Essential hypertension: Receiving Norvasc 10 mg orally daily Lopressor 25 mg twice daily          Hypovitaminosis D: The patient is receiving vitamin D3 1000 national units daily. Most recent vitamin D level on October 18, 2021 was 33.7. Seizure disorder: The patient is receiving  At present he denies polyuria,  Polydipsia, constitutional, sinus, visual, cardiopulmonary, urologic, gastrointestinal, immunologic/hematologic, additional musculoskeletal, neurologic,dermatologic, or psychiatric complaints.     Current Outpatient Medications on File Prior to Visit   Medication Sig Dispense Refill    atorvastatin (LIPITOR) 40 MG tablet       Handicap Placard MISC by Does not apply route Good for 5 years EXP: 7/27/2026 1 each 0    cetirizine (ZYRTEC) 10 MG tablet Take 1 tablet by mouth daily 30 tablet 11    gabapentin (NEURONTIN) 100 MG capsule TAKE 1 CAPSULE BY MOUTH THREE TIMES A DAY 90 capsule 5    levETIRAcetam (KEPPRA) 500 MG tablet Take 1 tablet by mouth 2 times daily 60 tablet 5    ferrous sulfate (IRON 325) 325 (65 Fe) MG tablet Take 1 tablet by mouth daily (with breakfast) 90 tablet 1    metoprolol tartrate (LOPRESSOR) 25 MG tablet Take 2 tablets by mouth daily 90 tablet 2    hydroCHLOROthiazide (HYDRODIURIL) 25 MG tablet Take 1 tablet by mouth every morning (Patient not taking: Reported on 6/10/2021) 30 tablet 0    metoprolol tartrate (LOPRESSOR) 25 MG tablet GIVE 1 TABLET VIA PEG TUBE IN THE MORNING AND AT BEDTIME (Patient not taking: Reported on 7/27/2021) 180 tablet 4    amLODIPine (NORVASC) 10 MG tablet 1 tablet via PEG daily. 90 tablet 4    ofloxacin (OCUFLOX) 0.3 % solution       oxyCODONE (ROXICODONE) 5 MG/5ML solution  (Patient not taking: Reported on 6/10/2021)      aspirin 81 MG chewable tablet Take 81 mg by mouth daily (Patient not taking: Reported on 7/27/2021)      melatonin 1 MG tablet Take 10 mg by mouth nightly as needed      Handicap Placard MISC by Does not apply route Good for 5 years. Expires 3/10/2026. 1 each 0     No current facility-administered medications on file prior to visit. Patient has no known allergies.   Past Medical History:   Diagnosis Date    Hyperlipidemia     Hypertension     Osteoarthritis     S/P inguinal herniorrhaphy     left    Stroke ([facility] [address] 12/20/2020     Past Surgical History:   Procedure Laterality Date    HERNIA REPAIR Bilateral     NC COLON CA SCRN NOT HI RSK IND N/A 10/6/2017    COLONOSCOPY performed by Jessica Brunson MD at [address] History     Socioeconomic History    Marital status:      Spouse name: Not on file    Number of children: Not on file    Years of education: Not on file    Highest education level: Not on file   Occupational History    Not on file   Tobacco Use    Smoking status: Former Packs/day: 1.00     Years: 20.00     Pack years: 20.00     Types: Cigarettes     Start date: 0     Quit date: 2020     Years since quittin.5    Smokeless tobacco: Never   Vaping Use    Vaping Use: Never used   Substance and Sexual Activity    Alcohol use: Yes     Alcohol/week: 18.0 standard drinks     Types: 18 Cans of beer per week    Drug use: Yes     Types: Marijuana Candiss Littler)     Comment: 2X/ month    Sexual activity: Not on file     Comment:    Other Topics Concern    Not on file   Social History Narrative    Not on file     Social Determinants of Health     Financial Resource Strain: Not on file   Food Insecurity: Not on file   Transportation Needs: Not on file   Physical Activity: Not on file   Stress: Not on file   Social Connections: Not on file   Intimate Partner Violence: Not on file   Housing Stability: Not on file     No family history on file. Review of Systems   Constitutional:  Negative for chills, diaphoresis, fatigue and fever. HENT:  Negative for congestion, dental problem, drooling, ear discharge, ear pain, facial swelling, hearing loss, mouth sores, nosebleeds, postnasal drip, rhinorrhea, sinus pressure, sinus pain, sneezing, sore throat, tinnitus, trouble swallowing and voice change. Eyes:  Negative for photophobia, pain, discharge, redness, itching and visual disturbance. Respiratory:  Negative for apnea, cough, chest tightness, shortness of breath and wheezing. Cardiovascular:  Negative for chest pain, palpitations and leg swelling. Gastrointestinal:  Negative for abdominal distention, abdominal pain, blood in stool, constipation, diarrhea, nausea, rectal pain and vomiting. Endocrine: Negative for cold intolerance, heat intolerance, polydipsia, polyphagia and polyuria. Genitourinary:  Negative for decreased urine volume, difficulty urinating, dysuria, flank pain, frequency, genital sores, hematuria and urgency.    Musculoskeletal:  Negative for arthralgias, back pain, gait problem, joint swelling, myalgias, neck pain and neck stiffness. Skin:  Negative for color change, rash and wound. Allergic/Immunologic: Negative for environmental allergies and food allergies. Neurological:  Negative for dizziness, tremors, seizures, syncope, facial asymmetry, speech difficulty, weakness, light-headedness, numbness and headaches. Hematological:  Negative for adenopathy. Does not bruise/bleed easily. Psychiatric/Behavioral:  Negative for agitation, confusion, decreased concentration, hallucinations, self-injury, sleep disturbance and suicidal ideas. The patient is not nervous/anxious. Objective:   /65   Pulse 80   Temp (!) 96.4 °F (35.8 °C)   Resp 16   Wt 163 lb (73.9 kg)   SpO2 97%   BMI 26.31 kg/m²     Physical Exam  Constitutional:       General: He is not in acute distress. Appearance: He is well-developed. Comments: Sitting comfortably in wheelchair   HENT:      Head: Normocephalic. Right Ear: External ear normal.      Left Ear: External ear normal.   Eyes:      Conjunctiva/sclera: Conjunctivae normal.   Neck:      Vascular: No JVD. Trachea: No tracheal deviation. Cardiovascular:      Rate and Rhythm: Normal rate and regular rhythm. Heart sounds: Normal heart sounds. Pulmonary:      Effort: Pulmonary effort is normal. No respiratory distress. Breath sounds: Normal breath sounds. No wheezing or rales. Chest:      Chest wall: No tenderness. Abdominal:      General: Bowel sounds are normal. There is no distension. Palpations: Abdomen is soft. There is no mass. Tenderness: There is no abdominal tenderness. There is no guarding or rebound. Musculoskeletal:         General: No tenderness or deformity. Cervical back: Neck supple. Skin:     General: Skin is warm and dry. Coloration: Skin is not pale. Findings: No erythema or rash.    Neurological:      Mental Status: He is alert and oriented to person, place, and time. Motor: No abnormal muscle tone. Comments: Left hemiparesis   Psychiatric:         Thought Content: Thought content normal.         Judgment: Judgment normal.     Component      Latest Ref Rng & Units 4/5/2022           7:50 AM   Levetiracetam      10 - 40 ug/mL 10     Component      Latest Ref Rng & Units 3/11/2022 3/11/2022 3/10/2022          11:30 AM 11:30 AM  6:21 AM   WBC      4.8 - 10.8 K/uL   6.0   RBC      4.70 - 6.10 M/uL   4.19 (L)   Hemoglobin Quant      14.0 - 18.0 g/dL   10.6 (L)   Hematocrit      42.0 - 52.0 %   33.7 (L)   MCV      80.0 - 100.0 fL   80.5   MCH      27.0 - 31.3 pg   25.4 (L)   MCHC      33.0 - 37.0 %   31.6 (L)   RDW      11.5 - 14.5 %   16.4 (H)   Platelet Count      138 - 400 K/uL   301   Neutrophils %      %   59.4   Lymphocyte %      %   25.3   Monocytes %      %   11.6   Eosinophils %      %   3.0   Basophils %      %   0.7   Neutrophils Absolute      1.4 - 6.5 K/uL   3.6   Lymphocytes Absolute      1.0 - 4.8 K/uL   1.5   Monocytes Absolute      0.2 - 0.8 K/uL   0.7   Eosinophils Absolute      0.0 - 0.7 K/uL   0.2   Basophils Absolute      0.0 - 0.2 K/uL   0.0   Sodium      135 - 144 mEq/L  142    Potassium      3.4 - 4.9 mEq/L  4.3    Chloride      95 - 107 mEq/L  106    CO2      20 - 31 mEq/L  21    Anion Gap      9 - 15 mEq/L  15    GLUCOSE, FASTING,GF      70 - 99 mg/dL  110 (H)    BUN,BUNPL      8 - 23 mg/dL  10    Creatinine      0.70 - 1.20 mg/dL  0.76    GFR Non-      >60  >60.0    GFR       >60  >60.0    CALCIUM, SERUM, 519687      8.5 - 9.9 mg/dL  9.4    Magnesium      1.7 - 2.4 mg/dL 2.1       Component      Latest Ref Rng & Units 4/5/2022           7:50 AM   Levetiracetam      10 - 40 ug/mL 10       Assessment:       Diagnosis Orders   1. History of cerebrovascular accident        2. Essential hypertension        3.  Seizure disorder (HCC)        4. Spastic hemiplegia of left nondominant side as late effect of cerebral infarction ([facility] [address]        5. Hypovitaminosis D        6. Iron deficiency anemia, unspecified iron deficiency anemia type        7. Hyperlipidemia, unspecified hyperlipidemia type               No results found for: LIPIDPAN, BMP, CMP, CBC, CBCAUTODIF  Plan:      Diagnoses and all orders for this visit:  Muscle spasms: stable. Monitoring        Left lower extremity edema: Ultrasound of the lower extremity was negative for DVT. Spastic hemiplegia of left nondominant side as late effect of cerebral infarction (HCC)/abdominal paresis/oral phase dysphagia-optimizing blood pressure, continue Lipitor. Continue puréed diet        Primary hypertension-continue Norvasc 10 mg orally daily and metoprolol 25 mg twice daily        Neuropathic pain-continue Neurontin        Hypovitaminosis D-continue oral vitamin D replacement        Seizure disorder (HCC)-continue Keppra        Iron deficiency anemia, unspecified iron deficiency anemia type-continue iron sulfate         Return in about 1 month (around 8/18/2022). On this date 07/18/22 I have spent 30 minutes reviewing previous notes, test results and face to face with the patient discussing the diagnosis and importance of compliance with the treatment plan. Vinay Valadez MD    Please note, this report has been partially produced using speech recognition software  and may cause  and /or contain errors related to that system including grammar, punctuation and spelling as well as words and phrases that may seem inappropriate. If there are questions or concerns please feel free to contact me to clarify.

## 2022-09-16 ENCOUNTER — OFFICE VISIT (OUTPATIENT)
Dept: GERIATRIC MEDICINE | Age: 62
End: 2022-09-16
Payer: MEDICAID

## 2022-09-16 DIAGNOSIS — I69.354 SPASTIC HEMIPLEGIA OF LEFT NONDOMINANT SIDE AS LATE EFFECT OF CEREBRAL INFARCTION (HCC): ICD-10-CM

## 2022-09-16 DIAGNOSIS — Z86.73 HISTORY OF CEREBROVASCULAR ACCIDENT: Primary | ICD-10-CM

## 2022-09-16 DIAGNOSIS — G40.909 SEIZURE DISORDER (HCC): ICD-10-CM

## 2022-09-16 DIAGNOSIS — D50.9 IRON DEFICIENCY ANEMIA, UNSPECIFIED IRON DEFICIENCY ANEMIA TYPE: ICD-10-CM

## 2022-09-16 DIAGNOSIS — E78.5 HYPERLIPIDEMIA, UNSPECIFIED HYPERLIPIDEMIA TYPE: ICD-10-CM

## 2022-09-16 DIAGNOSIS — I10 ESSENTIAL HYPERTENSION: ICD-10-CM

## 2022-09-16 DIAGNOSIS — E55.9 HYPOVITAMINOSIS D: ICD-10-CM

## 2022-09-16 PROCEDURE — 99309 SBSQ NF CARE MODERATE MDM 30: CPT | Performed by: INTERNAL MEDICINE

## 2022-09-20 VITALS
WEIGHT: 137.3 LBS | SYSTOLIC BLOOD PRESSURE: 121 MMHG | OXYGEN SATURATION: 97 % | DIASTOLIC BLOOD PRESSURE: 65 MMHG | RESPIRATION RATE: 16 BRPM | TEMPERATURE: 98.3 F | HEART RATE: 80 BPM | BODY MASS INDEX: 22.16 KG/M2

## 2022-09-20 ASSESSMENT — ENCOUNTER SYMPTOMS
CHEST TIGHTNESS: 0
APNEA: 0
TROUBLE SWALLOWING: 0
COLOR CHANGE: 0
SHORTNESS OF BREATH: 0
EYE DISCHARGE: 0
ABDOMINAL DISTENTION: 0
EYE PAIN: 0
CONSTIPATION: 0
ABDOMINAL PAIN: 0
FACIAL SWELLING: 0
RECTAL PAIN: 0
EYE REDNESS: 0
EYE ITCHING: 0
DIARRHEA: 0
BLOOD IN STOOL: 0
COUGH: 0
SORE THROAT: 0
VOMITING: 0
RHINORRHEA: 0
NAUSEA: 0
PHOTOPHOBIA: 0
SINUS PRESSURE: 0
VOICE CHANGE: 0
BACK PAIN: 0
SINUS PAIN: 0
WHEEZING: 0

## 2022-09-20 NOTE — PROGRESS NOTES
Years: 20.00     Pack years: 20.00     Types: Cigarettes     Start date: 0     Quit date: 2020     Years since quittin.7    Smokeless tobacco: Never   Vaping Use    Vaping Use: Never used   Substance and Sexual Activity    Alcohol use: Yes     Alcohol/week: 18.0 standard drinks     Types: 18 Cans of beer per week    Drug use: Yes     Types: Marijuana Harvey Mustard)     Comment: 2X/ month    Sexual activity: Not on file     Comment:    Other Topics Concern    Not on file   Social History Narrative    Not on file     Social Determinants of Health     Financial Resource Strain: Not on file   Food Insecurity: Not on file   Transportation Needs: Not on file   Physical Activity: Not on file   Stress: Not on file   Social Connections: Not on file   Intimate Partner Violence: Not on file   Housing Stability: Not on file     No family history on file. Review of Systems   Constitutional:  Negative for chills, diaphoresis, fatigue and fever. HENT:  Negative for congestion, dental problem, drooling, ear discharge, ear pain, facial swelling, hearing loss, mouth sores, nosebleeds, postnasal drip, rhinorrhea, sinus pressure, sinus pain, sneezing, sore throat, tinnitus, trouble swallowing and voice change. Eyes:  Negative for photophobia, pain, discharge, redness, itching and visual disturbance. Respiratory:  Negative for apnea, cough, chest tightness, shortness of breath and wheezing. Cardiovascular:  Negative for chest pain, palpitations and leg swelling. Gastrointestinal:  Negative for abdominal distention, abdominal pain, blood in stool, constipation, diarrhea, nausea, rectal pain and vomiting. Endocrine: Negative for cold intolerance, heat intolerance, polydipsia, polyphagia and polyuria. Genitourinary:  Negative for decreased urine volume, difficulty urinating, dysuria, flank pain, frequency, genital sores, hematuria and urgency.    Musculoskeletal:  Negative for arthralgias, back pain, gait problem, joint swelling, myalgias, neck pain and neck stiffness. Skin:  Negative for color change, rash and wound. Allergic/Immunologic: Negative for environmental allergies and food allergies. Neurological:  Negative for dizziness, tremors, seizures, syncope, facial asymmetry, speech difficulty, weakness, light-headedness, numbness and headaches. Hematological:  Negative for adenopathy. Does not bruise/bleed easily. Psychiatric/Behavioral:  Negative for agitation, confusion, decreased concentration, hallucinations, self-injury, sleep disturbance and suicidal ideas. The patient is not nervous/anxious. Objective:   /65   Pulse 80   Temp 98.3 °F (36.8 °C)   Resp 16   Wt 137 lb 4.8 oz (62.3 kg)   SpO2 97%   BMI 22.16 kg/m²     Physical Exam  Constitutional:       General: He is not in acute distress. Appearance: He is well-developed. Comments: Sitting comfortably in bed   HENT:      Head: Normocephalic. Right Ear: External ear normal.      Left Ear: External ear normal.   Eyes:      Conjunctiva/sclera: Conjunctivae normal.   Neck:      Vascular: No JVD. Trachea: No tracheal deviation. Cardiovascular:      Rate and Rhythm: Normal rate and regular rhythm. Heart sounds: Normal heart sounds. Pulmonary:      Effort: Pulmonary effort is normal. No respiratory distress. Breath sounds: Normal breath sounds. No wheezing or rales. Chest:      Chest wall: No tenderness. Abdominal:      General: Bowel sounds are normal. There is no distension. Palpations: Abdomen is soft. There is no mass. Tenderness: There is no abdominal tenderness. There is no guarding or rebound. Musculoskeletal:         General: No tenderness or deformity. Cervical back: Neck supple. Skin:     General: Skin is warm and dry. Coloration: Skin is not pale. Findings: No erythema or rash.    Neurological:      Mental Status: He is alert and oriented to person, place, and time.      Motor: No abnormal muscle tone. Comments: Left hemiparesis   Psychiatric:         Thought Content: Thought content normal.         Judgment: Judgment normal.     Component      Latest Ref Rng & Units 4/5/2022           7:50 AM   Levetiracetam      10 - 40 ug/mL 10     Component      Latest Ref Rng & Units 3/11/2022 3/11/2022 3/10/2022          11:30 AM 11:30 AM  6:21 AM   WBC      4.8 - 10.8 K/uL   6.0   RBC      4.70 - 6.10 M/uL   4.19 (L)   Hemoglobin Quant      14.0 - 18.0 g/dL   10.6 (L)   Hematocrit      42.0 - 52.0 %   33.7 (L)   MCV      80.0 - 100.0 fL   80.5   MCH      27.0 - 31.3 pg   25.4 (L)   MCHC      33.0 - 37.0 %   31.6 (L)   RDW      11.5 - 14.5 %   16.4 (H)   Platelet Count      346 - 400 K/uL   301   Neutrophils %      %   59.4   Lymphocyte %      %   25.3   Monocytes %      %   11.6   Eosinophils %      %   3.0   Basophils %      %   0.7   Neutrophils Absolute      1.4 - 6.5 K/uL   3.6   Lymphocytes Absolute      1.0 - 4.8 K/uL   1.5   Monocytes Absolute      0.2 - 0.8 K/uL   0.7   Eosinophils Absolute      0.0 - 0.7 K/uL   0.2   Basophils Absolute      0.0 - 0.2 K/uL   0.0   Sodium      135 - 144 mEq/L  142    Potassium      3.4 - 4.9 mEq/L  4.3    Chloride      95 - 107 mEq/L  106    CO2      20 - 31 mEq/L  21    Anion Gap      9 - 15 mEq/L  15    GLUCOSE, FASTING,GF      70 - 99 mg/dL  110 (H)    BUN,BUNPL      8 - 23 mg/dL  10    Creatinine      0.70 - 1.20 mg/dL  0.76    GFR Non-      >60  >60.0    GFR       >60  >60.0    CALCIUM, SERUM, [id number]      8.5 - 9.9 mg/dL  9.4    Magnesium      1.7 - 2.4 mg/dL 2.1       Component      Latest Ref Rng & Units 4/5/2022           7:50 AM   Levetiracetam      10 - 40 ug/mL 10       Assessment:       Diagnosis Orders   1. History of cerebrovascular accident        2. Essential hypertension        3.  Seizure disorder (HCC)        4. Spastic hemiplegia of left nondominant side as late effect of cerebral infarction ([facility] [address]        5. Hypovitaminosis D        6. Iron deficiency anemia, unspecified iron deficiency anemia type        7. Hyperlipidemia, unspecified hyperlipidemia type               No results found for: LIPIDPAN, BMP, CMP, CBC, CBCAUTODIF  Plan:      Diagnoses and all orders for this visit:  Muscle spasms: stable. Monitoring        Left lower extremity edema: Ultrasound of the lower extremity was negative for DVT. Spastic hemiplegia of left nondominant side as late effect of cerebral infarction (HCC)/abdominal paresis/oral phase dysphagia-optimizing blood pressure, continue Lipitor. Continue puréed diet        Primary hypertension-continue Norvasc 10 mg orally daily and metoprolol 25 mg twice daily        Neuropathic pain-continue Neurontin        Hypovitaminosis D-continue oral vitamin D replacement        Seizure disorder (HCC)-continue Keppra        Iron deficiency anemia, unspecified iron deficiency anemia type-continue iron sulfate         Return in about 1 month (around 10/16/2022). On this date 09/20/22 I have spent 30 minutes reviewing previous notes, test results and face to face with the patient discussing the diagnosis and importance of compliance with the treatment plan. Alexis Milligan MD    Please note, this report has been partially produced using speech recognition software  and may cause  and /or contain errors related to that system including grammar, punctuation and spelling as well as words and phrases that may seem inappropriate. If there are questions or concerns please feel free to contact me to clarify.

## 2022-10-03 ENCOUNTER — OFFICE VISIT (OUTPATIENT)
Dept: GERIATRIC MEDICINE | Age: 62
End: 2022-10-03
Payer: MEDICAID

## 2022-10-03 VITALS
WEIGHT: 166.4 LBS | RESPIRATION RATE: 20 BRPM | HEART RATE: 86 BPM | TEMPERATURE: 97.8 F | DIASTOLIC BLOOD PRESSURE: 64 MMHG | OXYGEN SATURATION: 95 % | BODY MASS INDEX: 26.86 KG/M2 | SYSTOLIC BLOOD PRESSURE: 132 MMHG

## 2022-10-03 DIAGNOSIS — D50.9 IRON DEFICIENCY ANEMIA, UNSPECIFIED IRON DEFICIENCY ANEMIA TYPE: ICD-10-CM

## 2022-10-03 DIAGNOSIS — E55.9 HYPOVITAMINOSIS D: ICD-10-CM

## 2022-10-03 DIAGNOSIS — I69.354 SPASTIC HEMIPLEGIA OF LEFT NONDOMINANT SIDE AS LATE EFFECT OF CEREBRAL INFARCTION (HCC): ICD-10-CM

## 2022-10-03 DIAGNOSIS — G40.909 SEIZURE DISORDER (HCC): ICD-10-CM

## 2022-10-03 DIAGNOSIS — E78.5 HYPERLIPIDEMIA, UNSPECIFIED HYPERLIPIDEMIA TYPE: ICD-10-CM

## 2022-10-03 DIAGNOSIS — G81.94 LEFT HEMIPARESIS (HCC): ICD-10-CM

## 2022-10-03 DIAGNOSIS — I10 ESSENTIAL HYPERTENSION: ICD-10-CM

## 2022-10-03 DIAGNOSIS — M62.838 MUSCLE SPASM: ICD-10-CM

## 2022-10-03 DIAGNOSIS — I10 PRIMARY HYPERTENSION: ICD-10-CM

## 2022-10-03 DIAGNOSIS — Z86.73 HISTORY OF CEREBROVASCULAR ACCIDENT: Primary | ICD-10-CM

## 2022-10-03 PROCEDURE — 99309 SBSQ NF CARE MODERATE MDM 30: CPT | Performed by: INTERNAL MEDICINE

## 2022-10-03 ASSESSMENT — ENCOUNTER SYMPTOMS
EYE ITCHING: 0
EYE REDNESS: 0
SINUS PAIN: 0
CONSTIPATION: 0
COUGH: 0
VOMITING: 0
BACK PAIN: 0
SINUS PRESSURE: 0
TROUBLE SWALLOWING: 0
VOICE CHANGE: 0
DIARRHEA: 0
SHORTNESS OF BREATH: 0
CHEST TIGHTNESS: 0
RHINORRHEA: 0
NAUSEA: 0
WHEEZING: 0
BLOOD IN STOOL: 0
ABDOMINAL PAIN: 0
FACIAL SWELLING: 0
COLOR CHANGE: 0
PHOTOPHOBIA: 0
EYE PAIN: 0
SORE THROAT: 0
EYE DISCHARGE: 0
RECTAL PAIN: 0
APNEA: 0
ABDOMINAL DISTENTION: 0

## 2022-10-04 NOTE — PROGRESS NOTES
Subjective:      Patient ID: Maritza Camacho is a 58 y.o. male Established patient, here for evaluation of the following chief complaint(s):  No chief complaint on file. Hypertension  Pertinent negatives include no chest pain, headaches, neck pain, palpitations or shortness of breath. 58-year-old hypertensive male status postacute CVA with left hemiparesis, associated dysphagia, iron deficiency anemia, seizure disorder, and hypovitaminosis D and hyperlipidemia being seen out of medical necessity for left lower extremity edema    Muscle spasms: The patient reports experiencing muscle spasms      Left lower extremity edema: The patient states that over the course of the past 24 hours he has noted worsening left calf pain and associated left lower extremity edema. .      History of cerebrovascular accident with associated dysphagia: The patient is receiving Lipitor the patient is receiving a puréed texture regular thin consistency diet. Neuropathic pain: Receiving gabapentin as needed for pain        Iron deficiency anemia: Receiving iron sulfate 325 mg daily        Essential hypertension: Receiving Norvasc 10 mg orally daily Lopressor 25 mg twice daily          Hypovitaminosis D: The patient is receiving vitamin D3 1000 national units daily. Most recent vitamin D level on October 18, 2021 was 33.7. Seizure disorder: compliant with keppra      At present he denies polyuria,  Polydipsia, constitutional, sinus, visual, cardiopulmonary, urologic, gastrointestinal, immunologic/hematologic, additional musculoskeletal, neurologic,dermatologic, or psychiatric complaints.     Current Outpatient Medications on File Prior to Visit   Medication Sig Dispense Refill    atorvastatin (LIPITOR) 40 MG tablet       Handicap Placard MISC by Does not apply route Good for 5 years EXP: 7/27/2026 1 each 0    cetirizine (ZYRTEC) 10 MG tablet Take 1 tablet by mouth daily 30 tablet 11    gabapentin (NEURONTIN) 100 MG capsule TAKE 1 CAPSULE BY MOUTH THREE TIMES A DAY 90 capsule 5    levETIRAcetam (KEPPRA) 500 MG tablet Take 1 tablet by mouth 2 times daily 60 tablet 5    ferrous sulfate (IRON 325) 325 (65 Fe) MG tablet Take 1 tablet by mouth daily (with breakfast) 90 tablet 1    metoprolol tartrate (LOPRESSOR) 25 MG tablet Take 2 tablets by mouth daily 90 tablet 2    hydroCHLOROthiazide (HYDRODIURIL) 25 MG tablet Take 1 tablet by mouth every morning (Patient not taking: Reported on 6/10/2021) 30 tablet 0    metoprolol tartrate (LOPRESSOR) 25 MG tablet GIVE 1 TABLET VIA PEG TUBE IN THE MORNING AND AT BEDTIME (Patient not taking: Reported on 7/27/2021) 180 tablet 4    amLODIPine (NORVASC) 10 MG tablet 1 tablet via PEG daily. 90 tablet 4    ofloxacin (OCUFLOX) 0.3 % solution       oxyCODONE (ROXICODONE) 5 MG/5ML solution  (Patient not taking: Reported on 6/10/2021)      aspirin 81 MG chewable tablet Take 81 mg by mouth daily (Patient not taking: Reported on 7/27/2021)      melatonin 1 MG tablet Take 10 mg by mouth nightly as needed      Handicap Placard MISC by Does not apply route Good for 5 years. Expires 3/10/2026. 1 each 0     No current facility-administered medications on file prior to visit. Patient has no known allergies.   Past Medical History:   Diagnosis Date    Hyperlipidemia     Hypertension     Osteoarthritis     S/P inguinal herniorrhaphy     left    Stroke ([facility] [address] 12/20/2020     Past Surgical History:   Procedure Laterality Date    HERNIA REPAIR Bilateral     HI COLON CA SCRN NOT HI RSK IND N/A 10/6/2017    [facility]af[facility] History     Socioeconomic History    Marital status:      Spouse name: Not on file    Number of children: Not on file    Years of education: Not on file    Highest education level: Not on file   Occupational History    Not on file   Tobacco Use    Smoking status: Former     Packs/day: 1.00 Years: 20.00     Pack years: 20.00     Types: Cigarettes     Start date: 0     Quit date: 2020     Years since quittin.7    Smokeless tobacco: Never   Vaping Use    Vaping Use: Never used   Substance and Sexual Activity    Alcohol use: Yes     Alcohol/week: 18.0 standard drinks     Types: 18 Cans of beer per week    Drug use: Yes     Types: Marijuana Shellye Burkitt)     Comment: 2X/ month    Sexual activity: Not on file     Comment:    Other Topics Concern    Not on file   Social History Narrative    Not on file     Social Determinants of Health     Financial Resource Strain: Not on file   Food Insecurity: Not on file   Transportation Needs: Not on file   Physical Activity: Not on file   Stress: Not on file   Social Connections: Not on file   Intimate Partner Violence: Not on file   Housing Stability: Not on file     No family history on file. Review of Systems   Constitutional:  Negative for chills, diaphoresis, fatigue and fever. HENT:  Negative for congestion, dental problem, drooling, ear discharge, ear pain, facial swelling, hearing loss, mouth sores, nosebleeds, postnasal drip, rhinorrhea, sinus pressure, sinus pain, sneezing, sore throat, tinnitus, trouble swallowing and voice change. Eyes:  Negative for photophobia, pain, discharge, redness, itching and visual disturbance. Respiratory:  Negative for apnea, cough, chest tightness, shortness of breath and wheezing. Cardiovascular:  Negative for chest pain, palpitations and leg swelling. Gastrointestinal:  Negative for abdominal distention, abdominal pain, blood in stool, constipation, diarrhea, nausea, rectal pain and vomiting. Endocrine: Negative for cold intolerance, heat intolerance, polydipsia, polyphagia and polyuria. Genitourinary:  Negative for decreased urine volume, difficulty urinating, dysuria, flank pain, frequency, genital sores, hematuria and urgency.    Musculoskeletal:  Negative for arthralgias, back pain, gait problem, joint swelling, myalgias, neck pain and neck stiffness. Skin:  Negative for color change, rash and wound. Allergic/Immunologic: Negative for environmental allergies and food allergies. Neurological:  Negative for dizziness, tremors, seizures, syncope, facial asymmetry, speech difficulty, weakness, light-headedness, numbness and headaches. Hematological:  Negative for adenopathy. Does not bruise/bleed easily. Psychiatric/Behavioral:  Negative for agitation, confusion, decreased concentration, hallucinations, self-injury, sleep disturbance and suicidal ideas. The patient is not nervous/anxious. Objective:   /64   Pulse 86   Temp 97.8 °F (36.6 °C)   Resp 20   Wt 166 lb 6.4 oz (75.5 kg)   SpO2 95%   BMI 26.86 kg/m²     Physical Exam  Constitutional:       General: He is not in acute distress. Appearance: He is well-developed. Comments: Sitting comfortably in bed   HENT:      Head: Normocephalic. Right Ear: External ear normal.      Left Ear: External ear normal.   Eyes:      Conjunctiva/sclera: Conjunctivae normal.   Neck:      Vascular: No JVD. Trachea: No tracheal deviation. Cardiovascular:      Rate and Rhythm: Normal rate and regular rhythm. Heart sounds: Normal heart sounds. Pulmonary:      Effort: Pulmonary effort is normal. No respiratory distress. Breath sounds: Normal breath sounds. No wheezing or rales. Chest:      Chest wall: No tenderness. Abdominal:      General: Bowel sounds are normal. There is no distension. Palpations: Abdomen is soft. There is no mass. Tenderness: There is no abdominal tenderness. There is no guarding or rebound. Musculoskeletal:         General: No tenderness or deformity. Cervical back: Neck supple. Skin:     General: Skin is warm and dry. Coloration: Skin is not pale. Findings: No erythema or rash.    Neurological:      Mental Status: He is alert and oriented to person, place, and time.      Motor: No abnormal muscle tone. Comments: Left hemiparesis   Psychiatric:         Thought Content: Thought content normal.         Judgment: Judgment normal.     Component      Latest Ref Rng & Units 4/5/2022           7:50 AM   Levetiracetam      10 - 40 ug/mL 10     Component      Latest Ref Rng & Units 3/11/2022 3/11/2022 3/10/2022          11:30 AM 11:30 AM  6:21 AM   WBC      4.8 - 10.8 K/uL   6.0   RBC      4.70 - 6.10 M/uL   4.19 (L)   Hemoglobin Quant      14.0 - 18.0 g/dL   10.6 (L)   Hematocrit      42.0 - 52.0 %   33.7 (L)   MCV      80.0 - 100.0 fL   80.5   MCH      27.0 - 31.3 pg   25.4 (L)   MCHC      33.0 - 37.0 %   31.6 (L)   RDW      11.5 - 14.5 %   16.4 (H)   Platelet Count      763 - 400 K/uL   301   Neutrophils %      %   59.4   Lymphocyte %      %   25.3   Monocytes %      %   11.6   Eosinophils %      %   3.0   Basophils %      %   0.7   Neutrophils Absolute      1.4 - 6.5 K/uL   3.6   Lymphocytes Absolute      1.0 - 4.8 K/uL   1.5   Monocytes Absolute      0.2 - 0.8 K/uL   0.7   Eosinophils Absolute      0.0 - 0.7 K/uL   0.2   Basophils Absolute      0.0 - 0.2 K/uL   0.0   Sodium      135 - 144 mEq/L  142    Potassium      3.4 - 4.9 mEq/L  4.3    Chloride      95 - 107 mEq/L  106    CO2      20 - 31 mEq/L  21    Anion Gap      9 - 15 mEq/L  15    GLUCOSE, FASTING,GF      70 - 99 mg/dL  110 (H)    BUN,BUNPL      8 - 23 mg/dL  10    Creatinine      0.70 - 1.20 mg/dL  0.76    GFR Non-      >60  >60.0    GFR       >60  >60.0    CALCIUM, SERUM, 801326      8.5 - 9.9 mg/dL  9.4    Magnesium      1.7 - 2.4 mg/dL 2.1       Component      Latest Ref Rng & Units 4/5/2022           7:50 AM   Levetiracetam      10 - 40 ug/mL 10       Assessment:       Diagnosis Orders   1. History of cerebrovascular accident        2. Essential hypertension        3.  Seizure disorder (HCC)        4. Spastic hemiplegia of left nondominant side as late effect of cerebral infarction ([facility] [place] [address]        5. Hypovitaminosis D        6. Iron deficiency anemia, unspecified iron deficiency anemia type        7. Hyperlipidemia, unspecified hyperlipidemia type        8. Muscle spasm        9. Left hemiparesis ([address]        10. Primary hypertension                 No results found for: LIPIDPAN, BMP, CMP, CBC, CBCAUTODIF  Plan:      Diagnoses and all orders for this visit:  Muscle spasms: stable. Monitoring        Left lower extremity edema: Ultrasound of the lower extremity was negative for DVT. Spastic hemiplegia of left nondominant side as late effect of cerebral infarction (HCC)/abdominal paresis/oral phase dysphagia-optimizing blood pressure, continue Lipitor. Continue puréed diet        Primary hypertension-continue Norvasc 10 mg orally daily and metoprolol 25 mg twice daily        Neuropathic pain-continue Neurontin        Hypovitaminosis D-continue oral vitamin D replacement        Seizure disorder (HCC)-continue Keppra        Iron deficiency anemia, unspecified iron deficiency anemia type-continue iron sulfate         Return in about 1 month (around 11/3/2022). On this date 10/03/22 I have spent 30 minutes reviewing previous notes, test results and face to face with the patient discussing the diagnosis and importance of compliance with the treatment plan. Jaron Edwards MD    Please note, this report has been partially produced using speech recognition software  and may cause  and /or contain errors related to that system including grammar, punctuation and spelling as well as words and phrases that may seem inappropriate. If there are questions or concerns please feel free to contact me to clarify.

## 2022-10-18 ENCOUNTER — TELEPHONE (OUTPATIENT)
Dept: GASTROENTEROLOGY | Age: 62
End: 2022-10-18

## 2022-12-17 ENCOUNTER — OFFICE VISIT (OUTPATIENT)
Dept: GERIATRIC MEDICINE | Age: 62
End: 2022-12-17

## 2022-12-17 DIAGNOSIS — M79.2 NEUROPATHIC PAIN: ICD-10-CM

## 2022-12-17 DIAGNOSIS — M62.838 MUSCLE SPASM: ICD-10-CM

## 2022-12-17 DIAGNOSIS — D50.9 IRON DEFICIENCY ANEMIA, UNSPECIFIED IRON DEFICIENCY ANEMIA TYPE: ICD-10-CM

## 2022-12-17 DIAGNOSIS — I69.354 SPASTIC HEMIPLEGIA OF LEFT NONDOMINANT SIDE AS LATE EFFECT OF CEREBRAL INFARCTION (HCC): ICD-10-CM

## 2022-12-17 DIAGNOSIS — R13.11 ORAL PHASE DYSPHAGIA: ICD-10-CM

## 2022-12-17 DIAGNOSIS — E55.9 HYPOVITAMINOSIS D: ICD-10-CM

## 2022-12-17 DIAGNOSIS — Z86.73 HISTORY OF CEREBROVASCULAR ACCIDENT: Primary | ICD-10-CM

## 2022-12-17 DIAGNOSIS — E78.5 HYPERLIPIDEMIA, UNSPECIFIED HYPERLIPIDEMIA TYPE: ICD-10-CM

## 2022-12-17 DIAGNOSIS — G40.909 SEIZURE DISORDER (HCC): ICD-10-CM

## 2022-12-17 DIAGNOSIS — I10 ESSENTIAL HYPERTENSION: ICD-10-CM

## 2022-12-17 ASSESSMENT — ENCOUNTER SYMPTOMS
ABDOMINAL DISTENTION: 0
FACIAL SWELLING: 0
COLOR CHANGE: 0
VOMITING: 0
CHEST TIGHTNESS: 0
SORE THROAT: 0
ABDOMINAL PAIN: 0
APNEA: 0
EYE PAIN: 0
VOICE CHANGE: 0
CONSTIPATION: 0
SHORTNESS OF BREATH: 0
EYE REDNESS: 0
COUGH: 0
RHINORRHEA: 0
BLOOD IN STOOL: 0
EYE ITCHING: 0
SINUS PRESSURE: 0
SINUS PAIN: 0
BACK PAIN: 0
TROUBLE SWALLOWING: 0
NAUSEA: 0
RECTAL PAIN: 0
DIARRHEA: 0
WHEEZING: 0
PHOTOPHOBIA: 0
EYE DISCHARGE: 0

## 2022-12-17 NOTE — PROGRESS NOTES
Subjective:      Patient ID: Maynor Mora is a 58 y.o. male Established patient, here for evaluation of the following chief complaint(s):  Chief Complaint   Patient presents with    Hypertension           Hypertension  Pertinent negatives include no chest pain, headaches, neck pain, palpitations or shortness of breath. 58-year-old hypertensive male status postacute CVA with left hemiparesis, associated dysphagia, iron deficiency anemia, seizure disorder, and hypovitaminosis D and hyperlipidemia being seen out of medical necessity for the a forementioned medical problems. Muscle spasms: T well controlled at this time. Left lower extremity edema: The patient states that over the course of the past 24 hours he has noted worsening left calf pain and associated left lower extremity edema. .      History of cerebrovascular accident with associated dysphagia: The patient is receiving Lipitor the patient is receiving a puréed texture regular thin consistency diet. Neuropathic pain: Receiving gabapentin as needed for pain        Iron deficiency anemia: Receiving iron sulfate 325 mg daily        Essential hypertension: Receiving Norvasc 10 mg orally daily Lopressor 25 mg twice daily          Hypovitaminosis D: The patient is receiving vitamin D3 1000 national units daily. Most recent vitamin D level on October 18, 2021 was 33.7. Seizure disorder: compliant with keppra. No seizures have been noted. At present he denies polyuria,  Polydipsia, constitutional, sinus, visual, cardiopulmonary, urologic, gastrointestinal, immunologic/hematologic, additional musculoskeletal, neurologic,dermatologic, or psychiatric complaints.     Current Outpatient Medications on File Prior to Visit   Medication Sig Dispense Refill    atorvastatin (LIPITOR) 40 MG tablet       Handicap Placard MISC by Does not apply route Good for 5 years EXP: 7/27/2026 1 each 0    cetirizine (ZYRTEC) 10 MG tablet Take 1 tablet by mouth daily 30 tablet 11    gabapentin (NEURONTIN) 100 MG capsule TAKE 1 CAPSULE BY MOUTH THREE TIMES A DAY 90 capsule 5    levETIRAcetam (KEPPRA) 500 MG tablet Take 1 tablet by mouth 2 times daily 60 tablet 5    ferrous sulfate (IRON 325) 325 (65 Fe) MG tablet Take 1 tablet by mouth daily (with breakfast) 90 tablet 1    metoprolol tartrate (LOPRESSOR) 25 MG tablet Take 2 tablets by mouth daily 90 tablet 2    hydroCHLOROthiazide (HYDRODIURIL) 25 MG tablet Take 1 tablet by mouth every morning (Patient not taking: Reported on 6/10/2021) 30 tablet 0    metoprolol tartrate (LOPRESSOR) 25 MG tablet GIVE 1 TABLET VIA PEG TUBE IN THE MORNING AND AT BEDTIME (Patient not taking: Reported on 7/27/2021) 180 tablet 4    amLODIPine (NORVASC) 10 MG tablet 1 tablet via PEG daily. 90 tablet 4    ofloxacin (OCUFLOX) 0.3 % solution       oxyCODONE (ROXICODONE) 5 MG/5ML solution  (Patient not taking: Reported on 6/10/2021)      aspirin 81 MG chewable tablet Take 81 mg by mouth daily (Patient not taking: Reported on 7/27/2021)      melatonin 1 MG tablet Take 10 mg by mouth nightly as needed      Handicap Placard MISC by Does not apply route Good for 5 years. Expires 3/10/2026. 1 each 0     No current facility-administered medications on file prior to visit. Patient has no known allergies.   Past Medical History:   Diagnosis Date    Hyperlipidemia     Hypertension     Osteoarthritis     S/P inguinal herniorrhaphy     left    Stroke ([facility] [address] 12/20/2020     Past Surgical History:   Procedure Laterality Date    HERNIA REPAIR Bilateral     SC COLON CA SCRN NOT HI RSK IND N/A 10/6/2017    COLONOSCOPY performed by Bibi Shanks MD at [address] [facility] History     Socioeconomic History    Marital status:      Spouse name: Not on file    Number of children: Not on file    Years of education: Not on file    Highest education level: Not on file   Occupational History    Not on file Tobacco Use    Smoking status: Former     Packs/day: 1.00     Years: 20.00     Pack years: 20.00     Types: Cigarettes     Start date: 0     Quit date: 2020     Years since quittin.0    Smokeless tobacco: Never   Vaping Use    Vaping Use: Never used   Substance and Sexual Activity    Alcohol use: Yes     Alcohol/week: 18.0 standard drinks     Types: 18 Cans of beer per week    Drug use: Yes     Types: Marijuana Fredy Radon)     Comment: 2X/ month    Sexual activity: Not on file     Comment:    Other Topics Concern    Not on file   Social History Narrative    Not on file     Social Determinants of Health     Financial Resource Strain: Not on file   Food Insecurity: Not on file   Transportation Needs: Not on file   Physical Activity: Not on file   Stress: Not on file   Social Connections: Not on file   Intimate Partner Violence: Not on file   Housing Stability: Not on file     No family history on file. Review of Systems   Constitutional:  Negative for chills, diaphoresis, fatigue and fever. HENT:  Negative for congestion, dental problem, drooling, ear discharge, ear pain, facial swelling, hearing loss, mouth sores, nosebleeds, postnasal drip, rhinorrhea, sinus pressure, sinus pain, sneezing, sore throat, tinnitus, trouble swallowing and voice change. Eyes:  Negative for photophobia, pain, discharge, redness, itching and visual disturbance. Respiratory:  Negative for apnea, cough, chest tightness, shortness of breath and wheezing. Cardiovascular:  Negative for chest pain, palpitations and leg swelling. Gastrointestinal:  Negative for abdominal distention, abdominal pain, blood in stool, constipation, diarrhea, nausea, rectal pain and vomiting. Endocrine: Negative for cold intolerance, heat intolerance, polydipsia, polyphagia and polyuria. Genitourinary:  Negative for decreased urine volume, difficulty urinating, dysuria, flank pain, frequency, genital sores, hematuria and urgency. Musculoskeletal:  Negative for arthralgias, back pain, gait problem, joint swelling, myalgias, neck pain and neck stiffness. Skin:  Negative for color change, rash and wound. Allergic/Immunologic: Negative for environmental allergies and food allergies. Neurological:  Negative for dizziness, tremors, seizures, syncope, facial asymmetry, speech difficulty, weakness, light-headedness, numbness and headaches. Hematological:  Negative for adenopathy. Does not bruise/bleed easily. Psychiatric/Behavioral:  Negative for agitation, confusion, decreased concentration, hallucinations, self-injury, sleep disturbance and suicidal ideas. The patient is not nervous/anxious. Objective:   BP (!) 145/80   Pulse 87   Temp 97.5 °F (36.4 °C)   Resp 16   Wt 158 lb 1.6 oz (71.7 kg)   SpO2 97%   BMI 25.52 kg/m²     Physical Exam  Constitutional:       General: He is not in acute distress. Appearance: He is well-developed. Comments: Sitting comfortably in bed   HENT:      Head: Normocephalic. Right Ear: External ear normal.      Left Ear: External ear normal.   Eyes:      Conjunctiva/sclera: Conjunctivae normal.   Neck:      Vascular: No JVD. Trachea: No tracheal deviation. Cardiovascular:      Rate and Rhythm: Normal rate and regular rhythm. Heart sounds: Normal heart sounds. Pulmonary:      Effort: Pulmonary effort is normal. No respiratory distress. Breath sounds: Normal breath sounds. No wheezing or rales. Chest:      Chest wall: No tenderness. Abdominal:      General: Bowel sounds are normal. There is no distension. Palpations: Abdomen is soft. There is no mass. Tenderness: There is no abdominal tenderness. There is no guarding or rebound. Musculoskeletal:         General: No tenderness or deformity. Cervical back: Neck supple. Skin:     General: Skin is warm and dry. Coloration: Skin is not pale. Findings: No erythema or rash.    Neurological: Mental Status: He is alert and oriented to person, place, and time. Motor: No abnormal muscle tone. Comments: Left hemiparesis   Psychiatric:         Thought Content: Thought content normal.         Judgment: Judgment normal.     Component      Latest Ref Rng & Units 4/5/2022           7:50 AM   Levetiracetam      10 - 40 ug/mL 10     Component      Latest Ref Rng & Units 3/11/2022 3/11/2022 3/10/2022          11:30 AM 11:30 AM  6:21 AM   WBC      4.8 - 10.8 K/uL   6.0   RBC      4.70 - 6.10 M/uL   4.19 (L)   Hemoglobin Quant      14.0 - 18.0 g/dL   10.6 (L)   Hematocrit      42.0 - 52.0 %   33.7 (L)   MCV      80.0 - 100.0 fL   80.5   MCH      27.0 - 31.3 pg   25.4 (L)   MCHC      33.0 - 37.0 %   31.6 (L)   RDW      11.5 - 14.5 %   16.4 (H)   Platelet Count      531 - 400 K/uL   301   Neutrophils %      %   59.4   Lymphocyte %      %   25.3   Monocytes %      %   11.6   Eosinophils %      %   3.0   Basophils %      %   0.7   Neutrophils Absolute      1.4 - 6.5 K/uL   3.6   Lymphocytes Absolute      1.0 - 4.8 K/uL   1.5   Monocytes Absolute      0.2 - 0.8 K/uL   0.7   Eosinophils Absolute      0.0 - 0.7 K/uL   0.2   Basophils Absolute      0.0 - 0.2 K/uL   0.0   Sodium      135 - 144 mEq/L  142    Potassium      3.4 - 4.9 mEq/L  4.3    Chloride      95 - 107 mEq/L  106    CO2      20 - 31 mEq/L  21    Anion Gap      9 - 15 mEq/L  15    GLUCOSE, FASTING,GF      70 - 99 mg/dL  110 (H)    BUN,BUNPL      8 - 23 mg/dL  10    Creatinine      0.70 - 1.20 mg/dL  0.76    GFR Non-      >60  >60.0    GFR       >60  >60.0    CALCIUM, SERUM, [id number]      8.5 - 9.9 mg/dL  9.4    Magnesium      1.7 - 2.4 mg/dL 2.1       Component      Latest Ref Rng & Units 4/5/2022           7:50 AM   Levetiracetam      10 - 40 ug/mL 10       Assessment:       Diagnosis Orders   1. History of cerebrovascular accident        2. Essential hypertension        3.  Seizure disorder (HCC)        4. Spastic hemiplegia of left nondominant side as late effect of cerebral infarction ([address]        5. Hypovitaminosis D        6. Iron deficiency anemia, unspecified iron deficiency anemia type        7. Hyperlipidemia, unspecified hyperlipidemia type        8. Oral phase dysphagia        9. Muscle spasm        10. Neuropathic pain                 No results found for: LIPIDPAN, BMP, CMP, CBC, CBCAUTODIF  Plan:      Muscle spasms: stable. Monitoring        Left lower extremity edema: Continue monitoring. Spastic hemiplegia of left nondominant side as late effect of cerebral infarction (HCC)/abdominal paresis/oral phase dysphagia-optimizing blood pressure, continue Lipitor. Continue puréed diet        Primary hypertension-continue Norvasc 10 mg orally daily and metoprolol 25 mg twice daily        Neuropathic pain-continue Neurontin        Hypovitaminosis D-continue oral vitamin D replacement        Seizure disorder (HCC)-continue Keppra        Iron deficiency anemia, unspecified iron deficiency anemia type-continue iron sulfate         Return in about 1 year (around 12/17/2023). On this date 12/20/22 I have spent 30 minutes reviewing previous notes, test results and face to face with the patient discussing the diagnosis and importance of compliance with the treatment plan. Rhonda Daniel MD    Please note, this report has been partially produced using speech recognition software  and may cause  and /or contain errors related to that system including grammar, punctuation and spelling as well as words and phrases that may seem inappropriate. If there are questions or concerns please feel free to contact me to clarify.

## 2023-02-05 ASSESSMENT — ENCOUNTER SYMPTOMS
EYE ITCHING: 0
ABDOMINAL PAIN: 0
BLOOD IN STOOL: 0
CONSTIPATION: 0
COUGH: 0
ABDOMINAL DISTENTION: 0
FACIAL SWELLING: 0
TROUBLE SWALLOWING: 0
RHINORRHEA: 0
DIARRHEA: 0
SINUS PAIN: 0
APNEA: 0
RECTAL PAIN: 0
EYE REDNESS: 0
VOMITING: 0
COLOR CHANGE: 0
BACK PAIN: 0
EYE PAIN: 0
CHEST TIGHTNESS: 0
SINUS PRESSURE: 0
SORE THROAT: 0
WHEEZING: 0
SHORTNESS OF BREATH: 0
PHOTOPHOBIA: 0
VOICE CHANGE: 0
NAUSEA: 0
EYE DISCHARGE: 0

## 2023-02-05 NOTE — PROGRESS NOTES
Subjective:      Patient ID: Graciela Zabala is a 61 y.o. male Established patient, here for evaluation of the following chief complaint(s):  Chief Complaint   Patient presents with    Other     CVA              Hypertension  Pertinent negatives include no chest pain, headaches, neck pain, palpitations or shortness of breath. 70-year-old hypertensive male status postacute CVA with left hemiparesis, associated dysphagia, iron deficiency anemia, seizure disorder, and hypovitaminosis D and hyperlipidemia being seen out of medical necessity for the a forementioned medical problems. Muscle spasms:  well controlled at this time. Left lower extremity edema: The patient states that over the course of the past 24 hours he has noted worsening left calf pain and associated left lower extremity edema. .      History of cerebrovascular acciden: The patient is receiving Lipitor the patient is receiving a puréed texture regular thin consistency diet. Neuropathic pain: Receiving gabapentin as needed for pain        Iron deficiency anemia: Receiving iron sulfate 325 mg daily        Essential hypertension: Receiving Norvasc 10 mg orally daily Lopressor 25 mg twice daily          Hypovitaminosis D: The patient is receiving vitamin D3 1000 national units daily. Most recent vitamin D level on October 18, 2021 was 33.7. Seizure disorder: compliant with keppra. No seizures have been noted. At present he denies polyuria,  Polydipsia, constitutional, sinus, visual, cardiopulmonary, urologic, gastrointestinal, immunologic/hematologic, additional musculoskeletal, neurologic,dermatologic, or psychiatric complaints.     Current Outpatient Medications on File Prior to Visit   Medication Sig Dispense Refill    atorvastatin (LIPITOR) 40 MG tablet       Handicap Placard MISC by Does not apply route Good for 5 years EXP: 7/27/2026 1 each 0    cetirizine (ZYRTEC) 10 MG tablet Take 1 tablet by mouth daily 30 tablet 11 gabapentin (NEURONTIN) 100 MG capsule TAKE 1 CAPSULE BY MOUTH THREE TIMES A DAY 90 capsule 5    levETIRAcetam (KEPPRA) 500 MG tablet Take 1 tablet by mouth 2 times daily 60 tablet 5    ferrous sulfate (IRON 325) 325 (65 Fe) MG tablet Take 1 tablet by mouth daily (with breakfast) 90 tablet 1    metoprolol tartrate (LOPRESSOR) 25 MG tablet Take 2 tablets by mouth daily 90 tablet 2    hydroCHLOROthiazide (HYDRODIURIL) 25 MG tablet Take 1 tablet by mouth every morning (Patient not taking: Reported on 6/10/2021) 30 tablet 0    metoprolol tartrate (LOPRESSOR) 25 MG tablet GIVE 1 TABLET VIA PEG TUBE IN THE MORNING AND AT BEDTIME (Patient not taking: Reported on 7/27/2021) 180 tablet 4    amLODIPine (NORVASC) 10 MG tablet 1 tablet via PEG daily. 90 tablet 4    ofloxacin (OCUFLOX) 0.3 % solution       oxyCODONE (ROXICODONE) 5 MG/5ML solution  (Patient not taking: Reported on 6/10/2021)      aspirin 81 MG chewable tablet Take 81 mg by mouth daily (Patient not taking: Reported on 7/27/2021)      melatonin 1 MG tablet Take 10 mg by mouth nightly as needed      Handicap Placard MISC by Does not apply route Good for 5 years. Expires 3/10/2026. 1 each 0     No current facility-administered medications on file prior to visit. Patient has no known allergies.   Past Medical History:   Diagnosis Date    Hyperlipidemia     Hypertension     Osteoarthritis     S/P inguinal herniorrhaphy     left    Stroke ([address] 12/20/2020     Past Surgical History:   Procedure Laterality Date    HERNIA REPAIR Bilateral     OK COLON CA SCRN NOT HI RSK IND N/A 10/6/2017    COLONOSCOPY performed by Aron Ellington MD at [address] History     Socioeconomic History    Marital status:      Spouse name: Not on file    Number of children: Not on file    Years of education: Not on file    Highest education level: Not on file   Occupational History    Not on file   Tobacco Use    Smoking status: Former     Packs/day: 1.00     Years: 20.00     Pack years: 20.00     Types: Cigarettes     Start date: 0     Quit date: 2020     Years since quittin.1    Smokeless tobacco: Never   Vaping Use    Vaping Use: Never used   Substance and Sexual Activity    Alcohol use: Yes     Alcohol/week: 18.0 standard drinks     Types: 18 Cans of beer per week    Drug use: Yes     Types: Marijuana Sobia Tahir)     Comment: 2X/ month    Sexual activity: Not on file     Comment:    Other Topics Concern    Not on file   Social History Narrative    Not on file     Social Determinants of Health     Financial Resource Strain: Not on file   Food Insecurity: Not on file   Transportation Needs: Not on file   Physical Activity: Not on file   Stress: Not on file   Social Connections: Not on file   Intimate Partner Violence: Not on file   Housing Stability: Not on file     No family history on file. Review of Systems   Constitutional:  Negative for chills, diaphoresis, fatigue and fever. HENT:  Negative for congestion, dental problem, drooling, ear discharge, ear pain, facial swelling, hearing loss, mouth sores, nosebleeds, postnasal drip, rhinorrhea, sinus pressure, sinus pain, sneezing, sore throat, tinnitus, trouble swallowing and voice change. Eyes:  Negative for photophobia, pain, discharge, redness, itching and visual disturbance. Respiratory:  Negative for apnea, cough, chest tightness, shortness of breath and wheezing. Cardiovascular:  Negative for chest pain, palpitations and leg swelling. Gastrointestinal:  Negative for abdominal distention, abdominal pain, blood in stool, constipation, diarrhea, nausea, rectal pain and vomiting. Endocrine: Negative for cold intolerance, heat intolerance, polydipsia, polyphagia and polyuria. Genitourinary:  Negative for decreased urine volume, difficulty urinating, dysuria, flank pain, frequency, genital sores, hematuria and urgency.    Musculoskeletal: Negative for arthralgias, back pain, gait problem, joint swelling, myalgias, neck pain and neck stiffness. Skin:  Negative for color change, rash and wound. Allergic/Immunologic: Negative for environmental allergies and food allergies. Neurological:  Negative for dizziness, tremors, seizures, syncope, facial asymmetry, speech difficulty, weakness, light-headedness, numbness and headaches. Hematological:  Negative for adenopathy. Does not bruise/bleed easily. Psychiatric/Behavioral:  Negative for agitation, confusion, decreased concentration, hallucinations, self-injury, sleep disturbance and suicidal ideas. The patient is not nervous/anxious. Objective:   BP 99/67   Pulse 96   Temp 97.8 °F (36.6 °C)   Resp 18   Wt 159 lb 4.8 oz (72.3 kg)   SpO2 98%   BMI 25.71 kg/m²     Physical Exam  Constitutional:       General: He is not in acute distress. Appearance: He is well-developed. Comments: Sitting comfortably in bed   HENT:      Head: Normocephalic. Right Ear: External ear normal.      Left Ear: External ear normal.   Eyes:      Conjunctiva/sclera: Conjunctivae normal.   Neck:      Vascular: No JVD. Trachea: No tracheal deviation. Cardiovascular:      Rate and Rhythm: Normal rate and regular rhythm. Heart sounds: Normal heart sounds. Pulmonary:      Effort: Pulmonary effort is normal. No respiratory distress. Breath sounds: Normal breath sounds. No wheezing or rales. Chest:      Chest wall: No tenderness. Abdominal:      General: Bowel sounds are normal. There is no distension. Palpations: Abdomen is soft. There is no mass. Tenderness: There is no abdominal tenderness. There is no guarding or rebound. Musculoskeletal:         General: No tenderness or deformity. Cervical back: Neck supple. Skin:     General: Skin is warm and dry. Coloration: Skin is not pale. Findings: No erythema or rash.    Neurological:      Mental Status: He is alert and oriented to person, place, and time. Motor: No abnormal muscle tone. Comments: Left hemiparesis   Psychiatric:         Thought Content: Thought content normal.         Judgment: Judgment normal.     Component      Latest Ref Rng & Units 4/5/2022           7:50 AM   Levetiracetam      10 - 40 ug/mL 10     Component      Latest Ref Rng & Units 3/11/2022 3/11/2022 3/10/2022          11:30 AM 11:30 AM  6:21 AM   WBC      4.8 - 10.8 K/uL   6.0   RBC      4.70 - 6.10 M/uL   4.19 (L)   Hemoglobin Quant      14.0 - 18.0 g/dL   10.6 (L)   Hematocrit      42.0 - 52.0 %   33.7 (L)   MCV      80.0 - 100.0 fL   80.5   MCH      27.0 - 31.3 pg   25.4 (L)   MCHC      33.0 - 37.0 %   31.6 (L)   RDW      11.5 - 14.5 %   16.4 (H)   Platelet Count      863 - 400 K/uL   301   Neutrophils %      %   59.4   Lymphocyte %      %   25.3   Monocytes %      %   11.6   Eosinophils %      %   3.0   Basophils %      %   0.7   Neutrophils Absolute      1.4 - 6.5 K/uL   3.6   Lymphocytes Absolute      1.0 - 4.8 K/uL   1.5   Monocytes Absolute      0.2 - 0.8 K/uL   0.7   Eosinophils Absolute      0.0 - 0.7 K/uL   0.2   Basophils Absolute      0.0 - 0.2 K/uL   0.0   Sodium      135 - 144 mEq/L  142    Potassium      3.4 - 4.9 mEq/L  4.3    Chloride      95 - 107 mEq/L  106    CO2      20 - 31 mEq/L  21    Anion Gap      9 - 15 mEq/L  15    GLUCOSE, FASTING,GF      70 - 99 mg/dL  110 (H)    BUN,BUNPL      8 - 23 mg/dL  10    Creatinine      0.70 - 1.20 mg/dL  0.76    GFR Non-      >60  >60.0    GFR       >60  >60.0    CALCIUM, SERUM, 259125      8.5 - 9.9 mg/dL  9.4    Magnesium      1.7 - 2.4 mg/dL 2.1       Component      Latest Ref Rng & Units 4/5/2022           7:50 AM   Levetiracetam      10 - 40 ug/mL 10       Assessment:       Diagnosis Orders   1. Muscle spasm        2. History of cerebrovascular accident        3. Left hemiparesis ([address]        4.  Hyperlipidemia, unspecified hyperlipidemia type        5. Essential hypertension        6. Seizure disorder ([facility] [address]                 No results found for: LIPIDPAN, BMP, CMP, CBC, CBCAUTODIF  Plan:      Muscle spasms: stable. Monitoring        Left lower extremity edema: Continue monitoring. Spastic hemiplegia of left nondominant side as late effect of cerebral infarction (HCC)/-optimizing blood pressure, continue Lipitor. Continue puréed diet        Primary hypertension-Lisinopril 10 mg daily, Norvasc 10 mg orally daily and metoprolol 25 mg twice daily        Neuropathic pain-continue Neurontin        Hypovitaminosis D-continue oral vitamin D replacement        Seizure disorder (HCC)-continue Keppra        Iron deficiency anemia, unspecified iron deficiency anemia type-continue iron sulfate         No follow-ups on file. On this date 02/07/23 I have spent 30 minutes reviewing previous notes, test results and face to face with the patient discussing the diagnosis and importance of compliance with the treatment plan. Romi Morales MD    Please note, this report has been partially produced using speech recognition software  and may cause  and /or contain errors related to that system including grammar, punctuation and spelling as well as words and phrases that may seem inappropriate. If there are questions or concerns please feel free to contact me to clarify.

## 2023-02-06 ENCOUNTER — OFFICE VISIT (OUTPATIENT)
Dept: GERIATRIC MEDICINE | Age: 63
End: 2023-02-06
Payer: MEDICAID

## 2023-02-06 DIAGNOSIS — G81.94 LEFT HEMIPARESIS (HCC): ICD-10-CM

## 2023-02-06 DIAGNOSIS — G40.909 SEIZURE DISORDER (HCC): ICD-10-CM

## 2023-02-06 DIAGNOSIS — E78.5 HYPERLIPIDEMIA, UNSPECIFIED HYPERLIPIDEMIA TYPE: ICD-10-CM

## 2023-02-06 DIAGNOSIS — I10 ESSENTIAL HYPERTENSION: ICD-10-CM

## 2023-02-06 DIAGNOSIS — Z86.73 HISTORY OF CEREBROVASCULAR ACCIDENT: ICD-10-CM

## 2023-02-06 DIAGNOSIS — M62.838 MUSCLE SPASM: Primary | ICD-10-CM

## 2023-02-06 PROCEDURE — 99309 SBSQ NF CARE MODERATE MDM 30: CPT | Performed by: INTERNAL MEDICINE

## 2023-02-07 VITALS
HEART RATE: 96 BPM | RESPIRATION RATE: 18 BRPM | WEIGHT: 159.3 LBS | SYSTOLIC BLOOD PRESSURE: 99 MMHG | OXYGEN SATURATION: 98 % | DIASTOLIC BLOOD PRESSURE: 67 MMHG | TEMPERATURE: 97.8 F | BODY MASS INDEX: 25.71 KG/M2

## 2023-02-08 LAB
ALBUMIN SERPL-MCNC: 3.8 G/DL (ref 3.5–4.6)
ALP BLD-CCNC: 138 U/L (ref 35–104)
ALT SERPL-CCNC: 43 U/L (ref 0–41)
ANION GAP SERPL CALCULATED.3IONS-SCNC: 11 MEQ/L (ref 9–15)
AST SERPL-CCNC: 19 U/L (ref 0–40)
BILIRUB SERPL-MCNC: <0.2 MG/DL (ref 0.2–0.7)
BUN BLDV-MCNC: 11 MG/DL (ref 8–23)
CALCIUM SERPL-MCNC: 9.1 MG/DL (ref 8.5–9.9)
CHLORIDE BLD-SCNC: 106 MEQ/L (ref 95–107)
CO2: 22 MEQ/L (ref 20–31)
CREAT SERPL-MCNC: 0.74 MG/DL (ref 0.7–1.2)
GFR SERPL CREATININE-BSD FRML MDRD: >60 ML/MIN/{1.73_M2}
GLOBULIN: 3.4 G/DL (ref 2.3–3.5)
GLUCOSE BLD-MCNC: 127 MG/DL (ref 70–99)
HCT VFR BLD CALC: 37 % (ref 42–52)
HEMOGLOBIN: 11.9 G/DL (ref 14–18)
MCH RBC QN AUTO: 26.3 PG (ref 27–31.3)
MCHC RBC AUTO-ENTMCNC: 32.2 % (ref 33–37)
MCV RBC AUTO: 81.7 FL (ref 79–92.2)
PDW BLD-RTO: 13.9 % (ref 11.5–14.5)
PLATELET # BLD: 353 K/UL (ref 130–400)
POTASSIUM SERPL-SCNC: 4 MEQ/L (ref 3.4–4.9)
RBC # BLD: 4.53 M/UL (ref 4.7–6.1)
SODIUM BLD-SCNC: 139 MEQ/L (ref 135–144)
TOTAL PROTEIN: 7.2 G/DL (ref 6.3–8)
WBC # BLD: 8 K/UL (ref 4.8–10.8)

## 2023-03-26 ENCOUNTER — OFFICE VISIT (OUTPATIENT)
Dept: GERIATRIC MEDICINE | Age: 63
End: 2023-03-26

## 2023-03-26 DIAGNOSIS — I10 ESSENTIAL HYPERTENSION: ICD-10-CM

## 2023-03-26 DIAGNOSIS — Z86.73 HISTORY OF CEREBROVASCULAR ACCIDENT: Primary | ICD-10-CM

## 2023-03-26 DIAGNOSIS — G81.94 LEFT HEMIPARESIS (HCC): ICD-10-CM

## 2023-03-26 DIAGNOSIS — E55.9 HYPOVITAMINOSIS D: ICD-10-CM

## 2023-03-26 DIAGNOSIS — I69.354 SPASTIC HEMIPLEGIA OF LEFT NONDOMINANT SIDE AS LATE EFFECT OF CEREBRAL INFARCTION (HCC): ICD-10-CM

## 2023-03-26 DIAGNOSIS — E78.5 HYPERLIPIDEMIA, UNSPECIFIED HYPERLIPIDEMIA TYPE: ICD-10-CM

## 2023-03-26 DIAGNOSIS — G40.909 SEIZURE DISORDER (HCC): ICD-10-CM

## 2023-03-27 VITALS
HEART RATE: 92 BPM | RESPIRATION RATE: 16 BRPM | DIASTOLIC BLOOD PRESSURE: 58 MMHG | BODY MASS INDEX: 25.2 KG/M2 | TEMPERATURE: 98.1 F | WEIGHT: 156.1 LBS | OXYGEN SATURATION: 95 % | SYSTOLIC BLOOD PRESSURE: 107 MMHG

## 2023-03-27 ASSESSMENT — ENCOUNTER SYMPTOMS
EYE PAIN: 0
FACIAL SWELLING: 0
SORE THROAT: 0
ABDOMINAL PAIN: 0
DIARRHEA: 0
RECTAL PAIN: 0
COUGH: 0
EYE ITCHING: 0
CHEST TIGHTNESS: 0
RHINORRHEA: 0
EYE DISCHARGE: 0
APNEA: 0
TROUBLE SWALLOWING: 0
BACK PAIN: 0
VOICE CHANGE: 0
EYE REDNESS: 0
ABDOMINAL DISTENTION: 0
BLOOD IN STOOL: 0
PHOTOPHOBIA: 0
WHEEZING: 0
VOMITING: 0
SHORTNESS OF BREATH: 0
NAUSEA: 0
CONSTIPATION: 0
COLOR CHANGE: 0
SINUS PAIN: 0
SINUS PRESSURE: 0

## 2023-03-27 NOTE — PROGRESS NOTES
MD    Please note, this report has been partially produced using speech recognition software  and may cause  and /or contain errors related to that system including grammar, punctuation and spelling as well as words and phrases that may seem inappropriate. If there are questions or concerns please feel free to contact me to clarify.

## 2023-04-27 LAB
BILIRUB UR QL STRIP: NEGATIVE
CLARITY UR: ABNORMAL
COLOR UR: YELLOW
GLUCOSE UR STRIP-MCNC: NEGATIVE MG/DL
HGB UR QL STRIP: NEGATIVE
KETONES UR STRIP-MCNC: NEGATIVE MG/DL
LEUKOCYTE ESTERASE UR QL STRIP: NEGATIVE
NITRITE UR QL STRIP: NEGATIVE
PH UR STRIP: 6 [PH] (ref 5–9)
PROT UR STRIP-MCNC: ABNORMAL MG/DL
SP GR UR STRIP: 1.03 (ref 1–1.03)
UROBILINOGEN UR STRIP-ACNC: 1 E.U./DL

## 2023-04-29 LAB — BACTERIA UR CULT: NORMAL

## 2023-05-01 ENCOUNTER — OFFICE VISIT (OUTPATIENT)
Dept: GERIATRIC MEDICINE | Age: 63
End: 2023-05-01
Payer: MEDICAID

## 2023-05-01 VITALS
TEMPERATURE: 97.5 F | OXYGEN SATURATION: 95 % | RESPIRATION RATE: 18 BRPM | WEIGHT: 164.4 LBS | DIASTOLIC BLOOD PRESSURE: 78 MMHG | BODY MASS INDEX: 26.53 KG/M2 | SYSTOLIC BLOOD PRESSURE: 122 MMHG | HEART RATE: 85 BPM

## 2023-05-01 DIAGNOSIS — G40.909 SEIZURE DISORDER (HCC): ICD-10-CM

## 2023-05-01 DIAGNOSIS — E78.5 HYPERLIPIDEMIA, UNSPECIFIED HYPERLIPIDEMIA TYPE: ICD-10-CM

## 2023-05-01 DIAGNOSIS — Z86.73 HISTORY OF CEREBROVASCULAR ACCIDENT: Primary | ICD-10-CM

## 2023-05-01 DIAGNOSIS — I10 ESSENTIAL HYPERTENSION: ICD-10-CM

## 2023-05-01 DIAGNOSIS — E55.9 HYPOVITAMINOSIS D: ICD-10-CM

## 2023-05-01 DIAGNOSIS — G81.94 LEFT HEMIPARESIS (HCC): ICD-10-CM

## 2023-05-01 PROCEDURE — 3078F DIAST BP <80 MM HG: CPT | Performed by: INTERNAL MEDICINE

## 2023-05-01 PROCEDURE — 3074F SYST BP LT 130 MM HG: CPT | Performed by: INTERNAL MEDICINE

## 2023-05-01 PROCEDURE — 99309 SBSQ NF CARE MODERATE MDM 30: CPT | Performed by: INTERNAL MEDICINE

## 2023-05-01 ASSESSMENT — ENCOUNTER SYMPTOMS
TROUBLE SWALLOWING: 0
VOICE CHANGE: 0
BACK PAIN: 0
DIARRHEA: 0
ABDOMINAL PAIN: 0
BLOOD IN STOOL: 0
EYE PAIN: 0
CHEST TIGHTNESS: 0
ABDOMINAL DISTENTION: 0
RHINORRHEA: 0
PHOTOPHOBIA: 0
CONSTIPATION: 0
COLOR CHANGE: 0
SHORTNESS OF BREATH: 0
SINUS PAIN: 0
EYE ITCHING: 0
SINUS PRESSURE: 0
APNEA: 0
COUGH: 0
EYE REDNESS: 0
NAUSEA: 0
RECTAL PAIN: 0
VOMITING: 0
FACIAL SWELLING: 0
EYE DISCHARGE: 0
WHEEZING: 0
SORE THROAT: 0

## 2023-05-02 NOTE — PROGRESS NOTES
Subjective:      Patient ID: Sekou Patterson is a 61 y.o. male Established patient, here for evaluation of the following chief complaint(s):  No chief complaint on file. Hypertension  Pertinent negatives include no chest pain, headaches, neck pain, palpitations or shortness of breath. 70-year-old hypertensive male status postacute CVA with left hemiparesis, associated dysphagia, iron deficiency anemia, seizure disorder, and hypovitaminosis D and hyperlipidemia being seen out of medical necessity for the a forementioned medical problems. Muscle spasms:  well controlled at this time. Left lower extremity edema: The patient states that over the course of the past 24 hours he has noted worsening left calf pain and associated left lower extremity edema. .          History of cerebrovascular acciden: The patient is receiving Lipitor the patient is receiving a puréed texture regular thin consistency diet. Neuropathic pain: Receiving gabapentin as needed for pain        Iron deficiency anemia: Receiving iron sulfate 325 mg daily        Essential hypertension: Receiving Norvasc 10 mg orally daily Lopressor 25 mg twice daily          Hypovitaminosis D: The patient is receiving vitamin D3 1000 national units daily. Most recent vitamin D level on October 18, 2021 was 33.7. Seizure disorder: compliant with keppra. No seizures have been noted. At present he denies polyuria,  Polydipsia, constitutional, sinus, visual, cardiopulmonary, urologic, gastrointestinal, immunologic/hematologic, additional musculoskeletal, neurologic,dermatologic, or psychiatric complaints.     Current Outpatient Medications on File Prior to Visit   Medication Sig Dispense Refill    atorvastatin (LIPITOR) 40 MG tablet       Handicap Placard MISC by Does not apply route Good for 5 years EXP: 7/27/2026 1 each 0    cetirizine (ZYRTEC) 10 MG tablet Take 1 tablet by mouth daily 30 tablet 11    gabapentin (NEURONTIN)

## 2023-06-21 ENCOUNTER — OFFICE VISIT (OUTPATIENT)
Dept: GERIATRIC MEDICINE | Age: 63
End: 2023-06-21
Payer: MEDICAID

## 2023-06-21 VITALS
RESPIRATION RATE: 18 BRPM | OXYGEN SATURATION: 96 % | DIASTOLIC BLOOD PRESSURE: 81 MMHG | WEIGHT: 167.1 LBS | SYSTOLIC BLOOD PRESSURE: 160 MMHG | TEMPERATURE: 97.5 F | BODY MASS INDEX: 26.97 KG/M2

## 2023-06-21 DIAGNOSIS — E55.9 HYPOVITAMINOSIS D: ICD-10-CM

## 2023-06-21 DIAGNOSIS — D50.9 IRON DEFICIENCY ANEMIA, UNSPECIFIED IRON DEFICIENCY ANEMIA TYPE: ICD-10-CM

## 2023-06-21 DIAGNOSIS — M62.838 MUSCLE SPASM: ICD-10-CM

## 2023-06-21 DIAGNOSIS — G40.909 SEIZURE DISORDER (HCC): ICD-10-CM

## 2023-06-21 DIAGNOSIS — I10 ESSENTIAL HYPERTENSION: ICD-10-CM

## 2023-06-21 DIAGNOSIS — G81.94 LEFT HEMIPARESIS (HCC): ICD-10-CM

## 2023-06-21 DIAGNOSIS — E78.5 HYPERLIPIDEMIA, UNSPECIFIED HYPERLIPIDEMIA TYPE: ICD-10-CM

## 2023-06-21 DIAGNOSIS — Z86.73 HISTORY OF CEREBROVASCULAR ACCIDENT: Primary | ICD-10-CM

## 2023-06-21 PROCEDURE — 99309 SBSQ NF CARE MODERATE MDM 30: CPT | Performed by: INTERNAL MEDICINE

## 2023-06-21 ASSESSMENT — ENCOUNTER SYMPTOMS
FACIAL SWELLING: 0
SINUS PAIN: 0
BACK PAIN: 0
APNEA: 0
CHEST TIGHTNESS: 0
EYE REDNESS: 0
RHINORRHEA: 0
SHORTNESS OF BREATH: 0
SORE THROAT: 0
EYE PAIN: 0
NAUSEA: 0
COUGH: 0
COLOR CHANGE: 0
RECTAL PAIN: 0
ABDOMINAL PAIN: 0
EYE ITCHING: 0
EYE DISCHARGE: 0
VOICE CHANGE: 0
TROUBLE SWALLOWING: 0
PHOTOPHOBIA: 0
VOMITING: 0
BLOOD IN STOOL: 0
DIARRHEA: 0
CONSTIPATION: 0
SINUS PRESSURE: 0
ABDOMINAL DISTENTION: 0
WHEEZING: 0

## 2023-06-21 NOTE — PROGRESS NOTES
Subjective:      Patient ID: Alcides Burton is a 61 y.o. male Established patient, here for evaluation of the following chief complaint(s):  No chief complaint on file. Hypertension  Pertinent negatives include no chest pain, headaches, neck pain, palpitations or shortness of breath. 68-year-old hypertensive male status postacute CVA with left hemiparesis, associated dysphagia, iron deficiency anemia, seizure disorder, and hypovitaminosis D and hyperlipidemia being seen out of medical necessity for the a forementioned medical problems. Muscle spasms:  well controlled at this time. Left lower extremity edema: The patient states that over the course of the past 24 hours he has noted worsening left calf pain and associated left lower extremity edema. .          History of cerebrovascular acciden: The patient is receiving Lipitor the patient is receiving a puréed texture regular thin consistency diet. Neuropathic pain: Receiving gabapentin as needed for pain        Iron deficiency anemia: Receiving iron sulfate 325 mg daily        Essential hypertension: Receiving Norvasc 10 mg orally daily Lopressor 25 mg twice daily          Hypovitaminosis D: The patient is receiving vitamin D3 1000 national units daily. Most recent vitamin D level on October 18, 2021 was 33.7. Seizure disorder: compliant with keppra. No seizures have been noted. At present he denies polyuria,  Polydipsia, constitutional, sinus, visual, cardiopulmonary, urologic, gastrointestinal, immunologic/hematologic, additional musculoskeletal, neurologic,dermatologic, or psychiatric complaints.     Current Outpatient Medications on File Prior to Visit   Medication Sig Dispense Refill    atorvastatin (LIPITOR) 40 MG tablet       Handicap Placard MISC by Does not apply route Good for 5 years EXP: 7/27/2026 1 each 0    cetirizine (ZYRTEC) 10 MG tablet Take 1 tablet by mouth daily 30 tablet 11    gabapentin (NEURONTIN)

## 2023-08-30 ENCOUNTER — OFFICE VISIT (OUTPATIENT)
Dept: GERIATRIC MEDICINE | Age: 63
End: 2023-08-30
Payer: MEDICAID

## 2023-08-30 DIAGNOSIS — I10 ESSENTIAL HYPERTENSION: ICD-10-CM

## 2023-08-30 DIAGNOSIS — E78.5 HYPERLIPIDEMIA, UNSPECIFIED HYPERLIPIDEMIA TYPE: ICD-10-CM

## 2023-08-30 DIAGNOSIS — Z86.73 HISTORY OF CEREBROVASCULAR ACCIDENT: Primary | ICD-10-CM

## 2023-08-30 DIAGNOSIS — G40.909 SEIZURE DISORDER (HCC): ICD-10-CM

## 2023-08-30 DIAGNOSIS — G81.94 LEFT HEMIPARESIS (HCC): ICD-10-CM

## 2023-08-30 PROCEDURE — 99309 SBSQ NF CARE MODERATE MDM 30: CPT | Performed by: INTERNAL MEDICINE

## 2023-08-30 ASSESSMENT — ENCOUNTER SYMPTOMS
ABDOMINAL DISTENTION: 0
SORE THROAT: 0
FACIAL SWELLING: 0
EYE REDNESS: 0
RECTAL PAIN: 0
VOMITING: 0
BACK PAIN: 0
NAUSEA: 0
EYE PAIN: 0
APNEA: 0
PHOTOPHOBIA: 0
ABDOMINAL PAIN: 0
EYE DISCHARGE: 0
RHINORRHEA: 0
VOICE CHANGE: 0
BLOOD IN STOOL: 0
CHEST TIGHTNESS: 0
TROUBLE SWALLOWING: 0
EYE ITCHING: 0
SINUS PAIN: 0
SINUS PRESSURE: 0
COUGH: 0
DIARRHEA: 0
WHEEZING: 0
SHORTNESS OF BREATH: 0
CONSTIPATION: 0
COLOR CHANGE: 0

## 2023-08-31 VITALS
SYSTOLIC BLOOD PRESSURE: 136 MMHG | TEMPERATURE: 98.6 F | WEIGHT: 169.8 LBS | DIASTOLIC BLOOD PRESSURE: 88 MMHG | BODY MASS INDEX: 27.41 KG/M2 | RESPIRATION RATE: 18 BRPM | OXYGEN SATURATION: 96 %

## 2023-11-13 ENCOUNTER — OFFICE VISIT (OUTPATIENT)
Dept: GERIATRIC MEDICINE | Age: 63
End: 2023-11-13

## 2023-11-13 DIAGNOSIS — I10 ESSENTIAL HYPERTENSION: ICD-10-CM

## 2023-11-13 DIAGNOSIS — E55.9 HYPOVITAMINOSIS D: ICD-10-CM

## 2023-11-13 DIAGNOSIS — E78.5 HYPERLIPIDEMIA, UNSPECIFIED HYPERLIPIDEMIA TYPE: ICD-10-CM

## 2023-11-13 DIAGNOSIS — G40.909 SEIZURE DISORDER (HCC): ICD-10-CM

## 2023-11-13 DIAGNOSIS — Z86.73 HISTORY OF CEREBROVASCULAR ACCIDENT: Primary | ICD-10-CM

## 2023-11-13 DIAGNOSIS — G81.94 LEFT HEMIPARESIS (HCC): ICD-10-CM

## 2023-11-13 ASSESSMENT — ENCOUNTER SYMPTOMS
BLOOD IN STOOL: 0
TROUBLE SWALLOWING: 0
SINUS PRESSURE: 0
RHINORRHEA: 0
FACIAL SWELLING: 0
EYE DISCHARGE: 0
WHEEZING: 0
EYE REDNESS: 0
BACK PAIN: 0
ABDOMINAL DISTENTION: 0
EYE ITCHING: 0
SORE THROAT: 0
COLOR CHANGE: 0
SINUS PAIN: 0
VOMITING: 0
PHOTOPHOBIA: 0
NAUSEA: 0
RECTAL PAIN: 0
DIARRHEA: 0
COUGH: 0
APNEA: 0
VOICE CHANGE: 0
EYE PAIN: 0
CONSTIPATION: 0
SHORTNESS OF BREATH: 0
CHEST TIGHTNESS: 0
ABDOMINAL PAIN: 0

## 2023-11-13 NOTE — PROGRESS NOTES
Subjective:      Patient ID: Zac Bee is a 61 y.o. male Established patient, here for evaluation of the following chief complaint(s):  Chief Complaint   Patient presents with    Hypertension             Hypertension  Pertinent negatives include no chest pain, headaches, neck pain, palpitations or shortness of breath. 41-year-old hypertensive male status postacute CVA with left hemiparesis, associated dysphagia, iron deficiency anemia, seizure disorder, and hypovitaminosis D and hyperlipidemia being seen out of medical necessity for the a forementioned medical problems. Muscle spasms:  well controlled at this time. Left lower extremity edema: The patient states that over the course of the past 24 hours he has noted worsening left calf pain and associated left lower extremity edema. .          History of cerebrovascular accident: The patient is receiving Lipitor the patient is receiving chemical soft diet. Neuropathic pain: Receiving gabapentin as needed for pain. Iron deficiency anemia: Receiving iron sulfate 325 mg daily. Essential hypertension: Receiving lisinopril 10 mg orally daily, Norvasc 10 mg orally daily Lopressor 25 mg twice daily. Hypovitaminosis D: The patient is receiving vitamin D3 1000 national units daily. Most recent vitamin D level on October 18, 2021 was 33.7. Seizure disorder: compliant with keppra. No seizures have been noted. At present he denies polyuria,  Polydipsia, constitutional, sinus, visual, cardiopulmonary, urologic, gastrointestinal, immunologic/hematologic, additional musculoskeletal, neurologic,dermatologic, or psychiatric complaints.     Current Outpatient Medications on File Prior to Visit   Medication Sig Dispense Refill    atorvastatin (LIPITOR) 40 MG tablet       Handicap Placard MISC by Does not apply route Good for 5 years EXP: 7/27/2026 1 each 0    cetirizine (ZYRTEC) 10 MG tablet Take 1 tablet by mouth daily

## 2023-11-16 VITALS
OXYGEN SATURATION: 96 % | WEIGHT: 173 LBS | TEMPERATURE: 97.7 F | RESPIRATION RATE: 18 BRPM | BODY MASS INDEX: 27.92 KG/M2 | DIASTOLIC BLOOD PRESSURE: 75 MMHG | HEART RATE: 81 BPM | SYSTOLIC BLOOD PRESSURE: 130 MMHG

## 2024-01-09 ENCOUNTER — OFFICE VISIT (OUTPATIENT)
Dept: GERIATRIC MEDICINE | Age: 64
End: 2024-01-09
Payer: MEDICAID

## 2024-01-09 DIAGNOSIS — G40.909 SEIZURE DISORDER (HCC): ICD-10-CM

## 2024-01-09 DIAGNOSIS — I10 ESSENTIAL HYPERTENSION: Primary | ICD-10-CM

## 2024-01-09 DIAGNOSIS — G81.94 LEFT HEMIPARESIS (HCC): ICD-10-CM

## 2024-01-09 DIAGNOSIS — E78.5 HYPERLIPIDEMIA, UNSPECIFIED HYPERLIPIDEMIA TYPE: ICD-10-CM

## 2024-01-09 DIAGNOSIS — Z86.73 HISTORY OF CEREBROVASCULAR ACCIDENT: ICD-10-CM

## 2024-01-09 PROCEDURE — 99214 OFFICE O/P EST MOD 30 MIN: CPT | Performed by: INTERNAL MEDICINE

## 2024-01-09 PROCEDURE — 3077F SYST BP >= 140 MM HG: CPT | Performed by: INTERNAL MEDICINE

## 2024-01-09 PROCEDURE — 3079F DIAST BP 80-89 MM HG: CPT | Performed by: INTERNAL MEDICINE

## 2024-01-10 VITALS
HEART RATE: 90 BPM | OXYGEN SATURATION: 97 % | TEMPERATURE: 97.8 F | SYSTOLIC BLOOD PRESSURE: 159 MMHG | DIASTOLIC BLOOD PRESSURE: 88 MMHG | RESPIRATION RATE: 18 BRPM

## 2024-01-10 ASSESSMENT — ENCOUNTER SYMPTOMS
SINUS PAIN: 0
APNEA: 0
EYE REDNESS: 0
BLOOD IN STOOL: 0
SINUS PRESSURE: 0
PHOTOPHOBIA: 0
RHINORRHEA: 0
NAUSEA: 0
COLOR CHANGE: 0
BACK PAIN: 0
RECTAL PAIN: 0
SHORTNESS OF BREATH: 0
SORE THROAT: 0
VOICE CHANGE: 0
EYE ITCHING: 0
CHEST TIGHTNESS: 0
TROUBLE SWALLOWING: 0
CONSTIPATION: 0
VOMITING: 0
DIARRHEA: 0
COUGH: 0
WHEEZING: 0
EYE PAIN: 0
ABDOMINAL DISTENTION: 0
FACIAL SWELLING: 0
EYE DISCHARGE: 0
ABDOMINAL PAIN: 0

## 2024-01-10 NOTE — PROGRESS NOTES
Subjective:      Patient ID: Pierce Tate is a 64 y.o. male Established patient, here for evaluation of the following chief complaint(s):  Chief Complaint   Patient presents with    Hypertension             Hypertension  Pertinent negatives include no chest pain, headaches, neck pain, palpitations or shortness of breath.     62-year-old hypertensive male status postacute CVA with left hemiparesis, associated dysphagia, iron deficiency anemia, seizure disorder, and hypovitaminosis D and hyperlipidemia being seen out of medical necessity for the a forementioned medical problems.    Muscle spasms:  well controlled at this time.          History of cerebrovascular accident: The patient is receiving Lipitor the patient is receiving chemical soft diet.          Neuropathic pain: Receiving gabapentin as needed for pain.        Iron deficiency anemia: Receiving iron sulfate 325 mg daily.        Essential hypertension: Receiving lisinopril 10 mg orally daily, Norvasc 10 mg orally daily Lopressor 25 mg twice daily.        Hypovitaminosis D: The patient is receiving vitamin D3 1000 national units daily.  Most recent vitamin D level on October 18, 2021 was 33.7.        Seizure disorder: compliant with keppra.  No seizures have been noted.      At present he denies polyuria,  Polydipsia, constitutional, sinus, visual, cardiopulmonary, urologic, gastrointestinal, immunologic/hematologic, additional musculoskeletal, neurologic,dermatologic, or psychiatric complaints.    Current Outpatient Medications on File Prior to Visit   Medication Sig Dispense Refill    atorvastatin (LIPITOR) 40 MG tablet       Handicap Placard MISC by Does not apply route Good for 5 years EXP: 7/27/2026 1 each 0    cetirizine (ZYRTEC) 10 MG tablet Take 1 tablet by mouth daily 30 tablet 11    gabapentin (NEURONTIN) 100 MG capsule TAKE 1 CAPSULE BY MOUTH THREE TIMES A DAY 90 capsule 5    levETIRAcetam (KEPPRA) 500 MG tablet Take 1 tablet by mouth 2 times

## 2024-04-29 ENCOUNTER — OFFICE VISIT (OUTPATIENT)
Dept: GERIATRIC MEDICINE | Age: 64
End: 2024-04-29

## 2024-04-29 DIAGNOSIS — D50.9 IRON DEFICIENCY ANEMIA, UNSPECIFIED IRON DEFICIENCY ANEMIA TYPE: ICD-10-CM

## 2024-04-29 DIAGNOSIS — E78.5 HYPERLIPIDEMIA, UNSPECIFIED HYPERLIPIDEMIA TYPE: ICD-10-CM

## 2024-04-29 DIAGNOSIS — I69.354 SPASTIC HEMIPLEGIA OF LEFT NONDOMINANT SIDE AS LATE EFFECT OF CEREBRAL INFARCTION (HCC): ICD-10-CM

## 2024-04-29 DIAGNOSIS — M79.2 NEUROPATHIC PAIN: ICD-10-CM

## 2024-04-29 DIAGNOSIS — E55.9 HYPOVITAMINOSIS D: ICD-10-CM

## 2024-04-29 DIAGNOSIS — I10 ESSENTIAL HYPERTENSION: Primary | ICD-10-CM

## 2024-04-29 DIAGNOSIS — G81.94 LEFT HEMIPARESIS (HCC): ICD-10-CM

## 2024-04-29 DIAGNOSIS — G40.909 SEIZURE DISORDER (HCC): ICD-10-CM

## 2024-04-29 DIAGNOSIS — Z86.73 HISTORY OF CEREBROVASCULAR ACCIDENT: ICD-10-CM

## 2024-05-01 VITALS
TEMPERATURE: 96.7 F | DIASTOLIC BLOOD PRESSURE: 67 MMHG | SYSTOLIC BLOOD PRESSURE: 121 MMHG | OXYGEN SATURATION: 98 % | HEART RATE: 105 BPM | BODY MASS INDEX: 38.67 KG/M2 | WEIGHT: 239.6 LBS | RESPIRATION RATE: 18 BRPM

## 2024-05-01 ASSESSMENT — ENCOUNTER SYMPTOMS
WHEEZING: 0
PHOTOPHOBIA: 0
EYE DISCHARGE: 0
EYE PAIN: 0
EYE REDNESS: 0
VOMITING: 0
CONSTIPATION: 0
NAUSEA: 0
SHORTNESS OF BREATH: 0
SINUS PAIN: 0
SINUS PRESSURE: 0
EYE ITCHING: 0
VOICE CHANGE: 0
APNEA: 0
CHEST TIGHTNESS: 0
BLOOD IN STOOL: 0
RHINORRHEA: 0
DIARRHEA: 0
FACIAL SWELLING: 0
BACK PAIN: 0
COLOR CHANGE: 0
RECTAL PAIN: 0
COUGH: 0
ABDOMINAL DISTENTION: 0
TROUBLE SWALLOWING: 0
ABDOMINAL PAIN: 0
SORE THROAT: 0

## 2024-05-01 NOTE — PROGRESS NOTES
patient discussing the diagnosis and importance of compliance with the treatment plan.     Jamil Parks MD    Please note, this report has been partially produced using speech recognition software  and may cause  and /or contain errors related to that system including grammar, punctuation and spelling as well as words and phrases that may seem inappropriate. If there are questions or concerns please feel free to contact me to clarify.

## 2024-10-09 ENCOUNTER — OFFICE VISIT (OUTPATIENT)
Dept: GERIATRIC MEDICINE | Age: 64
End: 2024-10-09

## 2024-10-09 VITALS
BODY MASS INDEX: 27.62 KG/M2 | OXYGEN SATURATION: 96 % | DIASTOLIC BLOOD PRESSURE: 101 MMHG | WEIGHT: 171.1 LBS | TEMPERATURE: 97.5 F | RESPIRATION RATE: 18 BRPM | HEART RATE: 94 BPM | SYSTOLIC BLOOD PRESSURE: 150 MMHG

## 2024-10-09 DIAGNOSIS — I10 ESSENTIAL HYPERTENSION: ICD-10-CM

## 2024-10-09 DIAGNOSIS — Z86.73 HISTORY OF CEREBROVASCULAR ACCIDENT: Primary | ICD-10-CM

## 2024-10-09 DIAGNOSIS — E78.5 HYPERLIPIDEMIA, UNSPECIFIED HYPERLIPIDEMIA TYPE: ICD-10-CM

## 2024-10-09 DIAGNOSIS — G40.909 SEIZURE DISORDER (HCC): ICD-10-CM

## 2024-10-09 DIAGNOSIS — G81.94 LEFT HEMIPARESIS (HCC): ICD-10-CM

## 2024-10-09 ASSESSMENT — ENCOUNTER SYMPTOMS
EYE REDNESS: 0
CHEST TIGHTNESS: 0
SINUS PRESSURE: 0
DIARRHEA: 0
TROUBLE SWALLOWING: 0
RHINORRHEA: 0
CONSTIPATION: 0
EYE ITCHING: 0
BLOOD IN STOOL: 0
PHOTOPHOBIA: 0
SHORTNESS OF BREATH: 0
FACIAL SWELLING: 0
BACK PAIN: 0
EYE PAIN: 0
COLOR CHANGE: 0
NAUSEA: 0
SINUS PAIN: 0
APNEA: 0
VOMITING: 0
VOICE CHANGE: 0
SORE THROAT: 0
WHEEZING: 0
RECTAL PAIN: 0
ABDOMINAL PAIN: 0
EYE DISCHARGE: 0
COUGH: 0
ABDOMINAL DISTENTION: 0

## 2024-10-09 NOTE — PROGRESS NOTES
Subjective:      Patient ID: Pierce Tate is a 64 y.o. male Established patient, here for evaluation of the following chief complaint(s):  No chief complaint on file.            Hypertension  Pertinent negatives include no chest pain, headaches, neck pain, palpitations or shortness of breath.     62-year-old hypertensive male status postacute CVA with left hemiparesis, associated dysphagia, iron deficiency anemia, seizure disorder, and hypovitaminosis D and hyperlipidemia being seen out of medical necessity for the a forementioned medical problems.    Muscle spasms:  well controlled at this time.          History of cerebrovascular accident: The patient is receiving Lipitor the patient is receiving chemical soft diet.          Neuropathic pain: Receiving gabapentin as needed for pain.        Iron deficiency anemia: Receiving iron sulfate 325 mg daily.        Essential hypertension: Receiving lisinopril 10 mg orally daily, Norvasc 10 mg orally daily Lopressor 25 mg twice daily.        Hypovitaminosis D: The patient is receiving vitamin D3 1000 national units daily.  Most recent vitamin D level on October 18, 2021 was 33.7.        Seizure disorder: compliant with keppra.  No seizures have been noted.      At present he denies polyuria,  Polydipsia, constitutional, sinus, visual, cardiopulmonary, urologic, gastrointestinal, immunologic/hematologic, additional musculoskeletal, neurologic,dermatologic, or psychiatric complaints.    Current Outpatient Medications on File Prior to Visit   Medication Sig Dispense Refill    atorvastatin (LIPITOR) 40 MG tablet       Handicap Placard MISC by Does not apply route Good for 5 years EXP: 7/27/2026 1 each 0    cetirizine (ZYRTEC) 10 MG tablet Take 1 tablet by mouth daily 30 tablet 11    gabapentin (NEURONTIN) 100 MG capsule TAKE 1 CAPSULE BY MOUTH THREE TIMES A DAY 90 capsule 5    levETIRAcetam (KEPPRA) 500 MG tablet Take 1 tablet by mouth 2 times daily 60 tablet 5    ferrous

## 2025-08-11 ENCOUNTER — HOSPITAL ENCOUNTER (EMERGENCY)
Age: 65
Discharge: HOME | End: 2025-08-11
Attending: EMERGENCY MEDICINE
Payer: MEDICARE

## 2025-08-11 VITALS
HEART RATE: 95 BPM | DIASTOLIC BLOOD PRESSURE: 86 MMHG | TEMPERATURE: 99.3 F | RESPIRATION RATE: 18 BRPM | WEIGHT: 184.97 LBS | SYSTOLIC BLOOD PRESSURE: 155 MMHG | OXYGEN SATURATION: 97 % | BODY MASS INDEX: 29.85 KG/M2

## 2025-08-11 DIAGNOSIS — R39.198 DIFFICULTY URINATING: Primary | ICD-10-CM

## 2025-08-11 LAB
BACTERIA URNS QL MICRO: NEGATIVE /HPF
BILIRUB UR QL STRIP: NEGATIVE
CLARITY UR: CLEAR
COLOR UR: YELLOW
EPI CELLS #/AREA URNS AUTO: ABNORMAL /HPF (ref 0–5)
GLUCOSE UR STRIP-MCNC: NEGATIVE MG/DL
HGB UR QL STRIP: NEGATIVE
HYALINE CASTS #/AREA URNS AUTO: ABNORMAL /HPF (ref 0–5)
KETONES UR STRIP-MCNC: NEGATIVE MG/DL
LEUKOCYTE ESTERASE UR QL STRIP: ABNORMAL
NITRITE UR QL STRIP: NEGATIVE
PH UR STRIP: 6 [PH] (ref 5–9)
PROT UR STRIP-MCNC: NEGATIVE MG/DL
RBC #/AREA URNS AUTO: ABNORMAL /HPF (ref 0–5)
SP GR UR STRIP: 1.03 (ref 1–1.03)
URINE REFLEX TO CULTURE: ABNORMAL
UROBILINOGEN UR STRIP-ACNC: 0.2 E.U./DL
WBC #/AREA URNS AUTO: ABNORMAL /HPF (ref 0–5)

## 2025-08-11 PROCEDURE — 51798 US URINE CAPACITY MEASURE: CPT

## 2025-08-11 PROCEDURE — 81001 URINALYSIS AUTO W/SCOPE: CPT

## 2025-08-11 PROCEDURE — 99283 EMERGENCY DEPT VISIT LOW MDM: CPT

## 2025-08-11 PROCEDURE — 51701 INSERT BLADDER CATHETER: CPT

## 2025-08-11 ASSESSMENT — LIFESTYLE VARIABLES
HOW OFTEN DO YOU HAVE A DRINK CONTAINING ALCOHOL: NEVER
HOW MANY STANDARD DRINKS CONTAINING ALCOHOL DO YOU HAVE ON A TYPICAL DAY: PATIENT DOES NOT DRINK

## 2025-08-11 ASSESSMENT — ENCOUNTER SYMPTOMS
SORE THROAT: 0
CHEST TIGHTNESS: 0
NAUSEA: 0
SHORTNESS OF BREATH: 0
ABDOMINAL PAIN: 1
VOMITING: 0
EYE PAIN: 0

## 2025-08-11 ASSESSMENT — PAIN SCALES - GENERAL: PAINLEVEL_OUTOF10: 0

## 2025-08-11 ASSESSMENT — PAIN - FUNCTIONAL ASSESSMENT: PAIN_FUNCTIONAL_ASSESSMENT: 0-10
